# Patient Record
Sex: FEMALE | Employment: OTHER | URBAN - METROPOLITAN AREA
[De-identification: names, ages, dates, MRNs, and addresses within clinical notes are randomized per-mention and may not be internally consistent; named-entity substitution may affect disease eponyms.]

---

## 2017-01-23 DIAGNOSIS — F32.81 PMDD (PREMENSTRUAL DYSPHORIC DISORDER): ICD-10-CM

## 2017-01-23 DIAGNOSIS — Z51.81 ENCOUNTER FOR THERAPEUTIC DRUG MONITORING: Primary | ICD-10-CM

## 2017-01-23 DIAGNOSIS — F32.89 OTHER DEPRESSION: ICD-10-CM

## 2017-01-23 NOTE — TELEPHONE ENCOUNTER
----- Message from [address] [place] [address] [address] Generic sent at 1/21/2017 10:59 PM CST -----  Regarding: Prescription Question  Contact: [phone]    Our group insurance just changed prescription coverage as of 1/1/17 to [employer].  They want us to use their mail order to

## 2017-01-23 NOTE — TELEPHONE ENCOUNTER
Patient now needs to use [facility] and is requesting a 90 day refill on her Sertraline 50mg # 90  LOV- 10/11/16  Last refill- 12/31/16  Future Appointments  Date Time Provider Judson Iverson   4/18/2017 4:00 PM Pramod Sutherland DO [facility] [address]

## 2017-04-18 ENCOUNTER — OFFICE VISIT (OUTPATIENT)
Dept: FAMILY MEDICINE CLINIC | Facility: CLINIC | Age: 57
End: 2017-04-18

## 2017-04-18 VITALS
HEIGHT: 67 IN | TEMPERATURE: 98 F | OXYGEN SATURATION: 99 % | WEIGHT: 293 LBS | HEART RATE: 88 BPM | DIASTOLIC BLOOD PRESSURE: 78 MMHG | RESPIRATION RATE: 16 BRPM | SYSTOLIC BLOOD PRESSURE: 134 MMHG | BODY MASS INDEX: 45.99 KG/M2

## 2017-04-18 DIAGNOSIS — Z53.8 PAP SMEAR OF CERVIX NOT NEEDED: ICD-10-CM

## 2017-04-18 DIAGNOSIS — Z12.31 VISIT FOR SCREENING MAMMOGRAM: ICD-10-CM

## 2017-04-18 DIAGNOSIS — Z12.11 SCREEN FOR COLON CANCER: ICD-10-CM

## 2017-04-18 DIAGNOSIS — Z00.00 ROUTINE GENERAL MEDICAL EXAMINATION AT A HEALTH CARE FACILITY: Primary | ICD-10-CM

## 2017-04-18 DIAGNOSIS — E78.5 DYSLIPIDEMIA: ICD-10-CM

## 2017-04-18 DIAGNOSIS — Z71.82 EXERCISE COUNSELING: ICD-10-CM

## 2017-04-18 DIAGNOSIS — Z71.3 DIETARY COUNSELING: ICD-10-CM

## 2017-04-18 DIAGNOSIS — R92.2 DENSE BREAST TISSUE: ICD-10-CM

## 2017-04-18 DIAGNOSIS — E55.9 VITAMIN D DEFICIENCY: ICD-10-CM

## 2017-04-18 PROCEDURE — 81003 URINALYSIS AUTO W/O SCOPE: CPT | Performed by: FAMILY MEDICINE

## 2017-04-18 PROCEDURE — 99396 PREV VISIT EST AGE 40-64: CPT | Performed by: FAMILY MEDICINE

## 2017-04-18 NOTE — PROGRESS NOTES
HPI:   Noemi Darling is a 61year old female who presents for a complete physical exam and pap. She is due for a mammogram and wants 2D 3D as hx of biopsies and dense breasts. Pt feeling well today.  Dentist and eye doctors--seen dentist and due for eyes; Dysmenorrhea    • Skin tag    • Dyspnea    • Obesity    • LLQ abdominal pain    • Breast pain, right    • Noncompliance    • Dyspareunia    • Neoplasm      vaginal wall   • Abnormal pap    • Acute UTI    • Boil      staphylococcal boils   • Folliculitis in no apparent distress. SKIN: Warm, dry, pink without rashes, no suspicious lesions. HEENT: atraumatic, normocephalic,ears, nose, and throat are clear. EYES: PERRLA, EOMI, conjunctiva are clear. NECK: supple, no adenopathy, no bruits, no thyromegaly. daily    Pap smear of cervix not needed--due in 2019 for next pap   Comments:  last pap 4/16    Vitamin D deficiency  -     Vitamin D, 25-Hydroxy [E]; Future    Depression screen  PHQ2 is zero    Dyslipidemia  -     Lipid Panel [E];  Future    Screen for co

## 2017-04-18 NOTE — PATIENT INSTRUCTIONS
Marely Kahn you were seen today for your annual breast exam and physical. Please continue healthy habits with your diet and exercise. Wear sunscreen as needed and insect repellant when needed. Do the colo cards and mail in the samples.  See the GI for colonoscop Depression All women in this age group At routine exams   Gonorrhea Sexually active women at increased risk for infection At routine exams   Hepatitis C Anyone at increased risk; 1 time for those born between [facility] At routine exams   High cholester Meningococcal Women at increased risk for infection – talk with your healthcare provider 1 or more doses   Pneumococcal conjugate vaccine (PCV13) and pneumococcal polysaccharide vaccine (PPSV23) Women at increased risk for infection – talk with your health

## 2017-04-21 ENCOUNTER — LAB ENCOUNTER (OUTPATIENT)
Dept: LAB | Age: 57
End: 2017-04-21
Attending: FAMILY MEDICINE
Payer: COMMERCIAL

## 2017-04-21 DIAGNOSIS — E55.9 VITAMIN D DEFICIENCY: ICD-10-CM

## 2017-04-21 DIAGNOSIS — Z00.00 ROUTINE GENERAL MEDICAL EXAMINATION AT A HEALTH CARE FACILITY: ICD-10-CM

## 2017-04-21 DIAGNOSIS — E78.5 DYSLIPIDEMIA: ICD-10-CM

## 2017-04-21 PROCEDURE — 80050 GENERAL HEALTH PANEL: CPT | Performed by: FAMILY MEDICINE

## 2017-04-21 PROCEDURE — 84439 ASSAY OF FREE THYROXINE: CPT | Performed by: FAMILY MEDICINE

## 2017-04-21 PROCEDURE — 82306 VITAMIN D 25 HYDROXY: CPT | Performed by: FAMILY MEDICINE

## 2017-04-21 PROCEDURE — 36415 COLL VENOUS BLD VENIPUNCTURE: CPT | Performed by: FAMILY MEDICINE

## 2017-04-21 PROCEDURE — 80061 LIPID PANEL: CPT | Performed by: FAMILY MEDICINE

## 2017-04-25 ENCOUNTER — HOSPITAL ENCOUNTER (OUTPATIENT)
Dept: MAMMOGRAPHY | Age: 57
Discharge: HOME | End: 2017-04-25
Attending: FAMILY MEDICINE
Payer: COMMERCIAL

## 2017-04-25 DIAGNOSIS — R92.2 DENSE BREAST TISSUE: ICD-10-CM

## 2017-04-25 DIAGNOSIS — Z12.31 VISIT FOR SCREENING MAMMOGRAM: ICD-10-CM

## 2017-04-25 PROCEDURE — 77067 SCR MAMMO BI INCL CAD: CPT

## 2017-04-25 PROCEDURE — 77063 BREAST TOMOSYNTHESIS BI: CPT

## 2017-05-02 ENCOUNTER — TELEPHONE (OUTPATIENT)
Dept: FAMILY MEDICINE CLINIC | Facility: CLINIC | Age: 57
End: 2017-05-02

## 2017-05-02 DIAGNOSIS — E78.5 DYSLIPIDEMIA: Primary | ICD-10-CM

## 2017-05-02 NOTE — PROGRESS NOTES
Quick Note:    Patient notified of elevated cholesterol levels and that she should reconsider starting statin treatment. Patient said she will never take any statins and no need to discuss lipid treatment. She knows what it takes to bring it down.  She is w

## 2017-05-02 NOTE — PROGRESS NOTES
Quick Note:    Please call pt with normal results of Vit D and thyroid studies. Creat elevated to 1.1 with otherwise stable CMP. Lipids still elevated jena 312 total with 224 LDL and HDL of only 55.  Pt should strongly re-consider statin treatment for her l

## 2017-05-02 NOTE — TELEPHONE ENCOUNTER
----- Message from Sommer Jeronimo DO sent at 5/1/2017 11:01 PM CDT -----  Please call pt with normal results of Vit D and thyroid studies. Creat elevated to 1.1 with otherwise stable CMP.  Lipids still elevated jena 312 total with 224 LDL and HDL of only

## 2017-06-21 ENCOUNTER — TELEPHONE (OUTPATIENT)
Dept: FAMILY MEDICINE CLINIC | Facility: CLINIC | Age: 57
End: 2017-06-21

## 2017-06-21 DIAGNOSIS — Z51.81 ENCOUNTER FOR THERAPEUTIC DRUG MONITORING: Primary | ICD-10-CM

## 2017-06-21 DIAGNOSIS — F32.81 PMDD (PREMENSTRUAL DYSPHORIC DISORDER): ICD-10-CM

## 2017-06-21 DIAGNOSIS — F32.89 OTHER DEPRESSION: ICD-10-CM

## 2017-08-01 ENCOUNTER — OFFICE VISIT (OUTPATIENT)
Dept: FAMILY MEDICINE CLINIC | Facility: CLINIC | Age: 57
End: 2017-08-01

## 2017-08-01 VITALS
HEART RATE: 60 BPM | SYSTOLIC BLOOD PRESSURE: 134 MMHG | DIASTOLIC BLOOD PRESSURE: 82 MMHG | HEIGHT: 67 IN | BODY MASS INDEX: 45.04 KG/M2 | RESPIRATION RATE: 16 BRPM | TEMPERATURE: 98 F | OXYGEN SATURATION: 99 % | WEIGHT: 287 LBS

## 2017-08-01 DIAGNOSIS — D25.9 UTERINE LEIOMYOMA, UNSPECIFIED LOCATION: ICD-10-CM

## 2017-08-01 DIAGNOSIS — R30.0 DYSURIA: ICD-10-CM

## 2017-08-01 DIAGNOSIS — R39.89 SENSATION OF PRESSURE IN BLADDER AREA: Primary | ICD-10-CM

## 2017-08-01 LAB
APPEARANCE: CLEAR
MULTISTIX LOT#: NORMAL NUMERIC
PH, URINE: 6 (ref 4.5–8)
SPECIFIC GRAVITY: 1.01 (ref 1–1.03)
URINE-COLOR: YELLOW
UROBILINOGEN,SEMI-QN: 0.2 MG/DL (ref 0–1.9)

## 2017-08-01 PROCEDURE — 81003 URINALYSIS AUTO W/O SCOPE: CPT | Performed by: FAMILY MEDICINE

## 2017-08-01 PROCEDURE — 87086 URINE CULTURE/COLONY COUNT: CPT | Performed by: FAMILY MEDICINE

## 2017-08-01 PROCEDURE — 99213 OFFICE O/P EST LOW 20 MIN: CPT | Performed by: FAMILY MEDICINE

## 2017-08-01 RX ORDER — PHENAZOPYRIDINE HYDROCHLORIDE 200 MG/1
200 TABLET, FILM COATED ORAL 3 TIMES DAILY PRN
Qty: 18 TABLET | Refills: 0 | Status: SHIPPED | OUTPATIENT
Start: 2017-08-01 | End: 2018-10-30 | Stop reason: ALTCHOICE

## 2017-08-01 NOTE — PROGRESS NOTES
Pt here for acute visit. Feels urinary pressure and feels like she has a full bladder and cramps at times --did home tests for UTI and negative. Urgent care visit and had normal dip and told to see PCP--another urine dip done today.  Informed UA normal. Sh 0.00      Years: 0.00      Alcohol use: Yes              Comment: social       REVIEW OF SYSTEMS:   GENERAL HEALTH: feels pelvic pressure and not sure if she has a UTI or not.  see HPI notes above for further details  SKIN: denies any unusual skin lesions o as needed for Pain.  -     US PELVIS (TRANSVAGINAL PELVIS) (CPT=76830); Future  -     Cancel: US PELVIS (TRANSABDOMINAL PELVIS)  (DYK=73226);  Future    Dysuria  -     URINALYSIS, AUTO, W/O SCOPE  -     URINE CULTURE, ROUTINE; Future  -     UROLOGY - INTERN

## 2017-08-01 NOTE — PATIENT INSTRUCTIONS
Radha Etienne you were seen for lower pelvic discomfort and your UA was normal. I sent for a urine culture. Try the Pyridium three times a day for 6 days. See urology and also your gyne doctor for evaluation. Get a pelvic US done as well to assess.  Tylenol or Joyce

## 2017-08-02 ENCOUNTER — HOSPITAL ENCOUNTER (OUTPATIENT)
Dept: ULTRASOUND IMAGING | Age: 57
Discharge: HOME | End: 2017-08-02
Attending: FAMILY MEDICINE
Payer: COMMERCIAL

## 2017-08-02 DIAGNOSIS — R30.0 DYSURIA: ICD-10-CM

## 2017-08-02 DIAGNOSIS — D25.9 UTERINE LEIOMYOMA, UNSPECIFIED LOCATION: ICD-10-CM

## 2017-08-02 DIAGNOSIS — R39.89 SENSATION OF PRESSURE IN BLADDER AREA: ICD-10-CM

## 2017-08-02 PROCEDURE — 76856 US EXAM PELVIC COMPLETE: CPT | Performed by: FAMILY MEDICINE

## 2017-08-02 PROCEDURE — 76830 TRANSVAGINAL US NON-OB: CPT | Performed by: FAMILY MEDICINE

## 2017-08-04 NOTE — PROGRESS NOTES
Please call pt with US results of pelvis. 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  There is suggestion of multiple myometrial cysts and may represent necrotic portions of

## 2017-08-04 NOTE — PROGRESS NOTES
Notes Recorded by Seferino Zepeda DO on 8/4/2017 at 8:35 AM CDT  Please call pt with negative urine culture ---  Dr. Santos Spearing

## 2017-08-07 ENCOUNTER — TELEPHONE (OUTPATIENT)
Dept: FAMILY MEDICINE CLINIC | Facility: CLINIC | Age: 57
End: 2017-08-07

## 2017-08-07 DIAGNOSIS — D25.0 INTRAMURAL AND SUBMUCOUS LEIOMYOMA OF UTERUS: Primary | ICD-10-CM

## 2017-08-07 DIAGNOSIS — D25.1 INTRAMURAL AND SUBMUCOUS LEIOMYOMA OF UTERUS: Primary | ICD-10-CM

## 2017-08-07 DIAGNOSIS — R10.2 PELVIC PAIN: ICD-10-CM

## 2017-08-07 DIAGNOSIS — R93.5 ABNORMAL CT OF THE ABDOMEN: ICD-10-CM

## 2017-08-07 DIAGNOSIS — Q51.3 BICORNATE UTERUS: ICD-10-CM

## 2017-08-07 NOTE — TELEPHONE ENCOUNTER
----- Message from [facility], DO sent at 8/4/2017  8:51 AM CDT -----  Please call pt with US results of pelvis. 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  There is sug

## 2017-09-26 ENCOUNTER — TELEPHONE (OUTPATIENT)
Dept: FAMILY MEDICINE CLINIC | Facility: CLINIC | Age: 57
End: 2017-09-26

## 2017-09-26 NOTE — TELEPHONE ENCOUNTER
Pt is having her 6 month follow up of breast nodules done on 10.11.17 and [facility] is calling for an order.  They need a limited or targeted US of the right breast. Please place and fax to [phone]

## 2017-10-06 ENCOUNTER — TELEPHONE (OUTPATIENT)
Dept: FAMILY MEDICINE CLINIC | Facility: CLINIC | Age: 57
End: 2017-10-06

## 2017-10-06 DIAGNOSIS — Z98.890 STATUS POST BIOPSY: Primary | ICD-10-CM

## 2017-10-06 NOTE — TELEPHONE ENCOUNTER
Call pt to see where she will be going and ok to order the right breast US at the correct place.  Dr. Tariq Espinoza

## 2017-10-06 NOTE — TELEPHONE ENCOUNTER
LARY called that patient is due for a 6 month right breast US post biopsy, she is scheduled for 10/11/17. Patient had a mammogram om 4/25/17 at [facility]? ?

## 2017-10-09 NOTE — TELEPHONE ENCOUNTER
LARY called in regards to this order. Please fax to [facility] at [phone] or call [phone]. Patient has an appt on Wednesday, October 11, 2017.

## 2017-10-10 ENCOUNTER — TELEPHONE (OUTPATIENT)
Dept: FAMILY MEDICINE CLINIC | Facility: CLINIC | Age: 57
End: 2017-10-10

## 2017-10-10 DIAGNOSIS — N63.0 BREAST MASS: Primary | ICD-10-CM

## 2017-10-11 ENCOUNTER — MED REC SCAN ONLY (OUTPATIENT)
Dept: FAMILY MEDICINE CLINIC | Facility: CLINIC | Age: 57
End: 2017-10-11

## 2017-10-12 ENCOUNTER — HOSPITAL ENCOUNTER (OUTPATIENT)
Dept: MRI IMAGING | Age: 57
Discharge: HOME | End: 2017-10-12
Attending: FAMILY MEDICINE
Payer: COMMERCIAL

## 2017-10-12 DIAGNOSIS — R93.5 ABNORMAL CT OF THE ABDOMEN: ICD-10-CM

## 2017-10-12 DIAGNOSIS — D25.1 INTRAMURAL AND SUBMUCOUS LEIOMYOMA OF UTERUS: ICD-10-CM

## 2017-10-12 DIAGNOSIS — D25.0 INTRAMURAL AND SUBMUCOUS LEIOMYOMA OF UTERUS: ICD-10-CM

## 2017-10-12 DIAGNOSIS — R10.2 PELVIC PAIN: ICD-10-CM

## 2017-10-12 DIAGNOSIS — Q51.3 BICORNATE UTERUS: ICD-10-CM

## 2017-10-12 PROCEDURE — 72197 MRI PELVIS W/O & W/DYE: CPT | Performed by: FAMILY MEDICINE

## 2017-10-12 PROCEDURE — 72195 MRI PELVIS W/O DYE: CPT | Performed by: FAMILY MEDICINE

## 2017-10-16 NOTE — PROGRESS NOTES
Please call pt with MRI results of her pelvis:  =====  CONCLUSION:    1. There are 2 relatively large uterine fibroids noted along the right and left halves of the uterine fundus respectively.  They are relatively symmetric in size and appearance with mild

## 2017-11-03 NOTE — PROGRESS NOTES
Notes Recorded by Seferino Zepeda DO on 10/16/2017 at 5:00 PM CDT  Please call pt with MRI results of her pelvis:  =====  CONCLUSION:    1.  There are 2 relatively large uterine fibroids noted along the right and left halves of the uterine fundus respecti

## 2018-01-06 PROCEDURE — 82272 OCCULT BLD FECES 1-3 TESTS: CPT | Performed by: FAMILY MEDICINE

## 2018-01-26 DIAGNOSIS — F32.81 PMDD (PREMENSTRUAL DYSPHORIC DISORDER): ICD-10-CM

## 2018-01-26 DIAGNOSIS — Z51.81 ENCOUNTER FOR THERAPEUTIC DRUG MONITORING: Primary | ICD-10-CM

## 2018-01-26 DIAGNOSIS — F32.89 OTHER DEPRESSION: ICD-10-CM

## 2018-01-26 NOTE — TELEPHONE ENCOUNTER
SERTRALINE HCL 50 MG TABLET  Will file in chart as: SERTRALINE HCL 50 MG Oral Tab  TAKE 1 TABLET BY MOUTH EVERY DAY       Disp: 90 tablet Refills: 0    Class: Normal Start: 1/26/2018   For: Other depression, PMDD (premenstrual dysphoric disorder), Encounte

## 2018-02-03 DIAGNOSIS — F32.89 OTHER DEPRESSION: ICD-10-CM

## 2018-02-03 DIAGNOSIS — Z51.81 ENCOUNTER FOR THERAPEUTIC DRUG MONITORING: ICD-10-CM

## 2018-02-03 DIAGNOSIS — F32.81 PMDD (PREMENSTRUAL DYSPHORIC DISORDER): ICD-10-CM

## 2018-02-03 NOTE — TELEPHONE ENCOUNTER
Received refill request from [address] [place] [address] for Sertraline 50 mg tab for quantity of 90. Reference number [id number].

## 2018-02-05 NOTE — TELEPHONE ENCOUNTER
Sertraline HCl 50 MG Oral Tab  Take 1 tablet (50 mg total) by mouth once daily.        Disp: 90 tablet Refills: 1    Class: Normal Start: 2/3/2018 - 8/2/2018   For: Other depression, PMDD (premenstrual dysphoric disorder), Encounter for therapeutic drug mon

## 2018-02-06 NOTE — TELEPHONE ENCOUNTER
There is nothing pended for me to approve  It looks like it was already sent   Pt is also due for an OV  8/1/17 was for an acute visit  Per note 4/18/17 pt was to follow-up in 6 months  Please reach out to pt to schedule OV

## 2018-02-06 NOTE — TELEPHONE ENCOUNTER
1st outreach to schedule ov. Pt informed of Dr. Mala Guerrier no longer with [facility]. Pt will research Doctor's at the practice and call back to schedule.

## 2018-04-06 ENCOUNTER — TELEPHONE (OUTPATIENT)
Dept: FAMILY MEDICINE CLINIC | Facility: CLINIC | Age: 58
End: 2018-04-06

## 2018-04-06 DIAGNOSIS — N63.0 BREAST MASS: Primary | ICD-10-CM

## 2018-04-06 NOTE — TELEPHONE ENCOUNTER
Order was under Dr. Ambika Pedroza and u/s is asking for new provider since she is no longer here. I have placed the order under Torie Pablo who is also covering for Dr. Lexis Etienne patients.

## 2018-04-06 NOTE — TELEPHONE ENCOUNTER
US BREAST RIGHT LIMITED (AEK=00286)    Please update and change provider on the above Order  PT is scheduled to have this done Thursday 4.12.18

## 2018-04-10 ENCOUNTER — TELEPHONE (OUTPATIENT)
Dept: FAMILY MEDICINE CLINIC | Facility: CLINIC | Age: 58
End: 2018-04-10

## 2018-05-01 ENCOUNTER — TELEPHONE (OUTPATIENT)
Dept: FAMILY MEDICINE CLINIC | Facility: CLINIC | Age: 58
End: 2018-05-01

## 2018-05-01 DIAGNOSIS — Z12.31 ENCOUNTER FOR SCREENING MAMMOGRAM FOR MALIGNANT NEOPLASM OF BREAST: ICD-10-CM

## 2018-05-01 DIAGNOSIS — R92.8 ABNORMAL MAMMOGRAM: Primary | ICD-10-CM

## 2018-05-01 NOTE — TELEPHONE ENCOUNTER
Per Marga Au: six month US repeat. 1 year mammogram recommended. See results above. Spoke with patient regarding breast US. All questions and concerns answered. Follow up testing ordered.

## 2018-10-01 ENCOUNTER — MED REC SCAN ONLY (OUTPATIENT)
Dept: FAMILY MEDICINE CLINIC | Facility: CLINIC | Age: 58
End: 2018-10-01

## 2018-10-10 ENCOUNTER — TELEPHONE (OUTPATIENT)
Dept: FAMILY MEDICINE CLINIC | Facility: CLINIC | Age: 58
End: 2018-10-10

## 2018-10-10 DIAGNOSIS — N63.0 LUMP OR MASS IN BREAST: Primary | ICD-10-CM

## 2018-10-10 NOTE — TELEPHONE ENCOUNTER
Spoke to Alfreda Thurman at [address] [place] [address] and patient is scheduling US right Limited breast, order is needed.   CPT 43089  DX: [id number]  Fax order to [phone]    Pt is having US as a follow up to Mammogram done in April 2018    Order pended for approval

## 2018-10-10 NOTE — TELEPHONE ENCOUNTER
Order faxed to [facility] imaging at [phone], confirmation received

## 2018-10-21 ENCOUNTER — PATIENT MESSAGE (OUTPATIENT)
Dept: FAMILY MEDICINE CLINIC | Facility: CLINIC | Age: 58
End: 2018-10-21

## 2018-10-22 ENCOUNTER — PATIENT MESSAGE (OUTPATIENT)
Dept: FAMILY MEDICINE CLINIC | Facility: CLINIC | Age: 58
End: 2018-10-22

## 2018-10-22 DIAGNOSIS — F32.81 PMDD (PREMENSTRUAL DYSPHORIC DISORDER): ICD-10-CM

## 2018-10-22 DIAGNOSIS — Z51.81 ENCOUNTER FOR THERAPEUTIC DRUG MONITORING: ICD-10-CM

## 2018-10-22 DIAGNOSIS — F32.89 OTHER DEPRESSION: ICD-10-CM

## 2018-10-22 NOTE — TELEPHONE ENCOUNTER
From: Jarrod Bean  To: Almas Sprague MD  Sent: 10/21/2018 11:32 PM CDT  Subject: Prescription Question    I need some help getting an extension on my Sertraline prescription.  [place] and came home today and in the

## 2018-10-22 NOTE — TELEPHONE ENCOUNTER
From: Noemi Darling  To: Nakita Taylor MD  Sent: 10/22/2018 1:28 AM CDT  Subject: Prescription Question    Sorry - you can disregard my previous message about the 7 day refill request for my sertraline.  I did finally locate it buried under some othe

## 2018-10-23 NOTE — TELEPHONE ENCOUNTER
Name from pharmacy: SERTRALINE 50MG TABLETS         Will file in chart as: SERTRALINE HCL 50 MG Oral Tab    Sig: TAKE 1 TABLET(50 MG) BY MOUTH EVERY DAY    Disp:  30 tablet    Refills:  0    Start: 10/22/2018     Class: Normal    For: Other depression, PM

## 2018-10-24 ENCOUNTER — PATIENT MESSAGE (OUTPATIENT)
Dept: FAMILY MEDICINE CLINIC | Facility: CLINIC | Age: 58
End: 2018-10-24

## 2018-10-24 NOTE — TELEPHONE ENCOUNTER
From: Chloe Drake  To: Katy Euceda MD  Sent: 10/24/2018 12:44 PM CDT  Subject: Other    Just got a call from [facility] for [address] for appointment reminder for a breast ultrasound on Friday. Looks like they need a doctor's order.  Alisa Correa

## 2018-10-26 ENCOUNTER — TELEPHONE (OUTPATIENT)
Dept: FAMILY MEDICINE CLINIC | Facility: CLINIC | Age: 58
End: 2018-10-26

## 2018-10-26 DIAGNOSIS — N63.0 LUMP OR MASS IN BREAST: Primary | ICD-10-CM

## 2018-10-26 NOTE — TELEPHONE ENCOUNTER
Siomara with [facility] is on the phone requesting an Ultra sound Left breast limited and Diagnostic L breast mammogram.  Pt is there right now and the found a lump on the Left breast N63     Transfer to triage.

## 2018-10-26 NOTE — TELEPHONE ENCOUNTER
Call transferred to me in triage. Patient is at [facility] for mammography - f/u right breast imaging. Patient has a palpable lump in the left breast and they are asking if we can send order to check that breast as well. I did send the order to them.

## 2018-10-30 NOTE — PROGRESS NOTES
[facility] Visit Note  10/30/2018      Subjective:      Patient ID: Jerris Severin is a 60year old female. Chief Complaint:  Patient presents with:  Medication Follow-Up: Here for med refills on the Sertraline.   Test Results: wants to discuss Resp: 16   Temp: 97.6 °F (36.4 °C)   TempSrc: Temporal   Weight: (!) 306 lb   Height: 67\"       Physical Examination   General:  Alert, in no acute distress  HEENT: NCAT, EOMI, mucus membranes moist   Neck:  No cervical lymphadenopathy  CV: Regular rate

## 2018-11-06 ENCOUNTER — TELEPHONE (OUTPATIENT)
Dept: FAMILY MEDICINE CLINIC | Facility: CLINIC | Age: 58
End: 2018-11-06

## 2018-11-06 DIAGNOSIS — R92.8 ABNORMAL MAMMOGRAM OF RIGHT BREAST: Primary | ICD-10-CM

## 2018-11-06 NOTE — TELEPHONE ENCOUNTER
Patient was scheduled for a biopsy but it was not necessary. They would like a new order for a \"targeted R breast US\" faxed to [phone].

## 2019-04-09 ENCOUNTER — TELEPHONE (OUTPATIENT)
Dept: FAMILY MEDICINE CLINIC | Facility: CLINIC | Age: 59
End: 2019-04-09

## 2019-04-09 DIAGNOSIS — N63.0 LUMP OR MASS IN BREAST: Primary | ICD-10-CM

## 2019-04-09 NOTE — TELEPHONE ENCOUNTER
LARY is calling for 6 month follow up orders for US of right breast and follow up mammo.  Please fax to [facility] at [phone]

## 2019-04-11 ENCOUNTER — OFFICE VISIT (OUTPATIENT)
Dept: FAMILY MEDICINE CLINIC | Facility: CLINIC | Age: 59
End: 2019-04-11
Payer: COMMERCIAL

## 2019-04-11 VITALS
BODY MASS INDEX: 47.09 KG/M2 | HEART RATE: 66 BPM | WEIGHT: 293 LBS | DIASTOLIC BLOOD PRESSURE: 84 MMHG | RESPIRATION RATE: 16 BRPM | TEMPERATURE: 98 F | HEIGHT: 66 IN | SYSTOLIC BLOOD PRESSURE: 136 MMHG

## 2019-04-11 DIAGNOSIS — Z78.0 POSTMENOPAUSAL ESTROGEN DEFICIENCY: ICD-10-CM

## 2019-04-11 DIAGNOSIS — Z12.4 SCREENING FOR CERVICAL CANCER: ICD-10-CM

## 2019-04-11 DIAGNOSIS — Z12.11 SCREENING FOR COLON CANCER: ICD-10-CM

## 2019-04-11 DIAGNOSIS — E78.2 MIXED HYPERLIPIDEMIA: ICD-10-CM

## 2019-04-11 DIAGNOSIS — E66.01 CLASS 3 SEVERE OBESITY DUE TO EXCESS CALORIES WITHOUT SERIOUS COMORBIDITY WITH BODY MASS INDEX (BMI) OF 50.0 TO 59.9 IN ADULT (HCC): ICD-10-CM

## 2019-04-11 DIAGNOSIS — Z77.011 LEAD EXPOSURE: ICD-10-CM

## 2019-04-11 DIAGNOSIS — R92.8 ABNORMAL MAMMOGRAM OF RIGHT BREAST: ICD-10-CM

## 2019-04-11 DIAGNOSIS — F32.0 CURRENT MILD EPISODE OF MAJOR DEPRESSIVE DISORDER WITHOUT PRIOR EPISODE (HCC): ICD-10-CM

## 2019-04-11 DIAGNOSIS — Z00.00 ROUTINE MEDICAL EXAM: Primary | ICD-10-CM

## 2019-04-11 DIAGNOSIS — E55.9 VITAMIN D INSUFFICIENCY: ICD-10-CM

## 2019-04-11 PROCEDURE — 88175 CYTOPATH C/V AUTO FLUID REDO: CPT | Performed by: FAMILY MEDICINE

## 2019-04-11 PROCEDURE — 87624 HPV HI-RISK TYP POOLED RSLT: CPT | Performed by: FAMILY MEDICINE

## 2019-04-11 PROCEDURE — 99396 PREV VISIT EST AGE 40-64: CPT | Performed by: FAMILY MEDICINE

## 2019-04-11 RX ORDER — CHLORAL HYDRATE 500 MG
1000 CAPSULE ORAL DAILY
COMMUNITY

## 2019-04-11 NOTE — PROGRESS NOTES
Patient presents with:  Physical  Pap      HPI:  Zara Cagle is a 60year old female here today for preventative visit. Imms- up to date with tdap and flu, will discuss shingrix.  had shingles.       Cervical cancer screening- last pap was nor SPECIMEN 1 SITE LEFT Left 2010    2 at same time, both benign. • NEEDLE BIOPSY LEFT Left 2016    ultra sound guided bx, lipoma. • NEEDLE BIOPSY RIGHT Right 11/2016    ultra sound guided bx, lipoma.    • Juna[address]       Current Outpatient Medic 3-4x/yr ,never a problem      Drug use: No      Sexual activity: Not on file    Lifestyle      Physical activity:        Days per week: Not on file        Minutes per session: Not on file      Stress: Not on file    Relationships      Social connections: reactive to light,  conjunctivae normal.    Ears- Tympanic membranes intact bilaterally. Nose- Patent, normal septum and turbinates. Mouth/Throat- Normal oral mucosa, throat non-erythematous.   NECK:  No submental, submandibular, ant/post cervical lym calories without serious comorbidity with body mass index (BMI) of 50.0 to 59.9 in adult (HCC)  - ASSAY, THYROID STIM HORMONE; Future  - OP REFERRAL TO WEIGHT LOSS CLINIC    10. Lead exposure  - LEAD, BLOOD; Future        Patient verbalized understanding a

## 2019-04-15 ENCOUNTER — TELEPHONE (OUTPATIENT)
Dept: FAMILY MEDICINE CLINIC | Facility: CLINIC | Age: 59
End: 2019-04-15

## 2019-04-15 DIAGNOSIS — R92.8 ABNORMAL MAMMOGRAM OF RIGHT BREAST: Primary | ICD-10-CM

## 2019-04-15 NOTE — TELEPHONE ENCOUNTER
[facility] has received an order for a Diagnostic Mammo, the order needs to be changed to a bilateral mammo. Fax number left, patient is scheduled for next week.

## 2019-04-17 ENCOUNTER — APPOINTMENT (OUTPATIENT)
Dept: LAB | Age: 59
End: 2019-04-17
Attending: FAMILY MEDICINE
Payer: COMMERCIAL

## 2019-04-17 DIAGNOSIS — E55.9 VITAMIN D INSUFFICIENCY: ICD-10-CM

## 2019-04-17 DIAGNOSIS — Z77.011 LEAD EXPOSURE: ICD-10-CM

## 2019-04-17 DIAGNOSIS — E66.01 CLASS 3 SEVERE OBESITY DUE TO EXCESS CALORIES WITHOUT SERIOUS COMORBIDITY WITH BODY MASS INDEX (BMI) OF 50.0 TO 59.9 IN ADULT (HCC): ICD-10-CM

## 2019-04-17 DIAGNOSIS — F32.0 CURRENT MILD EPISODE OF MAJOR DEPRESSIVE DISORDER WITHOUT PRIOR EPISODE (HCC): ICD-10-CM

## 2019-04-17 DIAGNOSIS — E78.2 MIXED HYPERLIPIDEMIA: ICD-10-CM

## 2019-04-17 PROCEDURE — 84443 ASSAY THYROID STIM HORMONE: CPT | Performed by: FAMILY MEDICINE

## 2019-04-17 PROCEDURE — 83655 ASSAY OF LEAD: CPT | Performed by: FAMILY MEDICINE

## 2019-04-17 PROCEDURE — 80061 LIPID PANEL: CPT | Performed by: FAMILY MEDICINE

## 2019-04-17 PROCEDURE — 82306 VITAMIN D 25 HYDROXY: CPT | Performed by: FAMILY MEDICINE

## 2019-04-17 PROCEDURE — 36415 COLL VENOUS BLD VENIPUNCTURE: CPT | Performed by: FAMILY MEDICINE

## 2019-04-17 PROCEDURE — 80053 COMPREHEN METABOLIC PANEL: CPT | Performed by: FAMILY MEDICINE

## 2019-04-18 ENCOUNTER — TELEPHONE (OUTPATIENT)
Dept: FAMILY MEDICINE CLINIC | Facility: CLINIC | Age: 59
End: 2019-04-18

## 2019-04-19 NOTE — TELEPHONE ENCOUNTER
Mammogram order changed. Unclear as to where pt will be having mammogram done. Order states [facility]  Fax information was to [facility]  Left message for patient to [facility] for clarification.

## 2019-05-02 NOTE — PROGRESS NOTES
Bilateral Digital Screening Mammogram received via fax from [address] located at [place] [address] in [facility]. Phone # [phone], fax # [phone]. Date of exam was 05/01/19, recommends recheck in 1 year.  Health

## 2019-06-06 ENCOUNTER — TELEPHONE (OUTPATIENT)
Dept: FAMILY MEDICINE CLINIC | Facility: CLINIC | Age: 59
End: 2019-06-06

## 2019-06-06 NOTE — TELEPHONE ENCOUNTER
Patient is having R eye cataract surgery with Dr. Alee Mcgill [facility] Only H&P needed, no labs, no ekg, fax to [phone]. Dx code X99.094. Patient scheduled for 6/11/19.  The office attempted to fax but they are having technic

## 2019-06-11 ENCOUNTER — OFFICE VISIT (OUTPATIENT)
Dept: FAMILY MEDICINE CLINIC | Facility: CLINIC | Age: 59
End: 2019-06-11
Payer: COMMERCIAL

## 2019-06-11 VITALS
SYSTOLIC BLOOD PRESSURE: 142 MMHG | TEMPERATURE: 97 F | HEART RATE: 68 BPM | RESPIRATION RATE: 16 BRPM | HEIGHT: 66 IN | BODY MASS INDEX: 47.09 KG/M2 | WEIGHT: 293 LBS | DIASTOLIC BLOOD PRESSURE: 80 MMHG

## 2019-06-11 DIAGNOSIS — N63.0 LUMP OR MASS IN BREAST: Primary | ICD-10-CM

## 2019-06-11 PROCEDURE — 99214 OFFICE O/P EST MOD 30 MIN: CPT | Performed by: PHYSICIAN ASSISTANT

## 2019-06-11 NOTE — PATIENT INSTRUCTIONS
Thank you for choosing Nevaeh Cortez PA-C at [place] [address]  To Do: Jefferson Goetz  1.  Pt to follow-up after surgery as directed    • Please signup for MY CHART, which is electronic access to your record if you have not done so.  All your results [phone]  Please allow our office 5 business days to contact you regarding any testing results. Refill policies:   Allow 3 business days for refills; controlled substances may take longer and must be picked up from the office in person.   Narcotic me

## 2019-06-11 NOTE — PROGRESS NOTES
Preoperative History and Physical    CC:  Patient presents with:  Pre-Op Exam: Right eye cataract surgery with Dr. Karoline Brar on 06/19/19 at the [facility].       Madi Arana is 60year old presenting for a preoperative exam.    This patient dysphoric disorder)    • Vitamin D deficiency        Past Surgical History:   Procedure Laterality Date   • BREAST BIOPSY      total of 5-6   • ENDOMETRIAL ABLATION  2005/6 & ~2014 2/2 DUB   • SANKET NEEDLE LOCALIZATION W/ SPECIMEN 1 SITE LEFT Left 2010 UNITS Oral Tab Take  by mouth. Disp:  Rfl:    mometasone (NASONEX) 50 MCG/ACT Nasal Suspension 2 sprays by Nasal route as needed. Disp: 17 g Rfl: 6     No current facility-administered medications on file prior to visit.      Review of Systems   Review of S Plan:  Lump or mass in breast  (primary encounter diagnosis)    As noted above this preoperative evaluation is at the request of Dr. Arthur Koyanagi  Patient presents with:  Pre-Op Exam: Right eye cataract surgery with Dr. Arthur Koyanagi on 06/19/19 at the [address] [place] [address] Outcome: Patient verbalizes understanding. No orders of the defined types were placed in this encounter. No orders of the defined types were placed in this encounter.     Requested Prescriptions      No prescriptions requested or ordered in this encou

## 2019-06-12 ENCOUNTER — PATIENT MESSAGE (OUTPATIENT)
Dept: FAMILY MEDICINE CLINIC | Facility: CLINIC | Age: 59
End: 2019-06-12

## 2019-06-12 DIAGNOSIS — F32.0 CURRENT MILD EPISODE OF MAJOR DEPRESSIVE DISORDER WITHOUT PRIOR EPISODE (HCC): ICD-10-CM

## 2019-06-12 NOTE — TELEPHONE ENCOUNTER
From: Shyam Moon  To: Genet Matson MD  Sent: 6/12/2019 1:20 PM CDT  Subject: Prescription Question    Will you send prescription for Sertraline to [facility] pharmacy so I can convert to 90 day mail order? Thank you.

## 2019-06-14 ENCOUNTER — TELEPHONE (OUTPATIENT)
Dept: FAMILY MEDICINE CLINIC | Facility: CLINIC | Age: 59
End: 2019-06-14

## 2019-06-14 NOTE — TELEPHONE ENCOUNTER
Right eye cataract surgery with Dr. Surya Mcdonough on 06/19/19 at the [facility]. Office visit notes from 6/11/[facility] fax # [phone]. Fax confirmation received.

## 2019-10-23 ENCOUNTER — OFFICE VISIT (OUTPATIENT)
Dept: FAMILY MEDICINE CLINIC | Facility: CLINIC | Age: 59
End: 2019-10-23
Payer: COMMERCIAL

## 2019-10-23 VITALS
BODY MASS INDEX: 47.09 KG/M2 | TEMPERATURE: 98 F | WEIGHT: 293 LBS | HEART RATE: 58 BPM | RESPIRATION RATE: 18 BRPM | OXYGEN SATURATION: 98 % | DIASTOLIC BLOOD PRESSURE: 74 MMHG | HEIGHT: 66 IN | SYSTOLIC BLOOD PRESSURE: 126 MMHG

## 2019-10-23 DIAGNOSIS — H26.9 CATARACT OF RIGHT EYE, UNSPECIFIED CATARACT TYPE: ICD-10-CM

## 2019-10-23 DIAGNOSIS — E66.01 CLASS 3 SEVERE OBESITY DUE TO EXCESS CALORIES WITHOUT SERIOUS COMORBIDITY WITH BODY MASS INDEX (BMI) OF 50.0 TO 59.9 IN ADULT (HCC): ICD-10-CM

## 2019-10-23 DIAGNOSIS — E78.5 DYSLIPIDEMIA: ICD-10-CM

## 2019-10-23 DIAGNOSIS — Z01.818 PREOP EXAMINATION: Primary | ICD-10-CM

## 2019-10-23 DIAGNOSIS — F32.0 CURRENT MILD EPISODE OF MAJOR DEPRESSIVE DISORDER WITHOUT PRIOR EPISODE (HCC): ICD-10-CM

## 2019-10-23 PROCEDURE — 99214 OFFICE O/P EST MOD 30 MIN: CPT | Performed by: PHYSICIAN ASSISTANT

## 2019-10-23 RX ORDER — OFLOXACIN 3 MG/ML
SOLUTION/ DROPS OPHTHALMIC
Refills: 1 | COMMUNITY
Start: 2019-06-11 | End: 2020-03-13

## 2019-10-23 RX ORDER — BROMFENAC 0.76 MG/ML
SOLUTION/ DROPS OPHTHALMIC
Refills: 1 | COMMUNITY
Start: 2019-06-11 | End: 2020-03-13

## 2019-10-23 RX ORDER — PREDNISOLONE ACETATE 10 MG/ML
SUSPENSION/ DROPS OPHTHALMIC
Refills: 1 | COMMUNITY
Start: 2019-06-11 | End: 2020-03-13

## 2019-10-23 NOTE — PROGRESS NOTES
Preoperative History and Physical    CC:  Patient presents with:  Pre-Op Exam: R eye cataract surgery with Dr. Mike Antonio on 11/6//2019.       Tarsha Asher is 60year old presenting for a preoperative exam.    This patient is having surgery on date: 11/6/19 30 yrs ago   • Dyslipidemia    • Dyspareunia    • Fibroids    • History of depression    • Neoplasm     vaginal wall   • Noncompliance    • Obesity    • PMDD (premenstrual dysphoric disorder)    • Vitamin D deficiency        Past Surgical History:   Proced tablet, Rfl: 3  omega-3 fatty acids 1000 MG Oral Cap, Take 1,000 mg by mouth daily. , Disp: , Rfl:   Fexofenadine HCl (ALLEGRA OR), Take 1 tablet by mouth as needed. , Disp: , Rfl:   Multiple Vitamin (MULTI-VITAMIN DAILY OR), Take  by mouth., Disp: , Rfl: rales.   Abdominal: Soft. Bowel sounds are normal. There is no tenderness. There is no rebound and no guarding. Lymphadenopathy:     She has no cervical adenopathy. Neurological: She is alert and oriented to person, place, and time.    Skin: Skin is war postop complications.  Alcohol and drug abuse can precipitate withdrawal after surgery or complications during surgery.     Personal or Family History of Surgical Complication         Medication Assessment   Performed     Pt is moderate risk for a low to i

## 2019-10-23 NOTE — PATIENT INSTRUCTIONS
Thank you for choosing Rgio Rose PA-C at [place] [address]  To Do: Kathleen Gasca  1.  Pt to follow-up after surgery    • Please signup for MY CHART, which is electronic access to your record if you have not done so.  All your results will post on [phone]  Please allow our office 5 business days to contact you regarding any testing results. Refill policies:   Allow 3 business days for refills; controlled substances may take longer and must be picked up from the office in person.   Narcotic me

## 2019-10-25 ENCOUNTER — TELEPHONE (OUTPATIENT)
Dept: FAMILY MEDICINE CLINIC | Facility: CLINIC | Age: 59
End: 2019-10-25

## 2019-10-25 NOTE — TELEPHONE ENCOUNTER
Pt to have cataract surgery of the right eye on 11/6/2019 with Dr. Surya Mcdonough. Surgery date was originally 10/30/2019. H&P clearance was completed and [facility] at fax# [phone]. Fax confirmation received.   Placed in Josselin's p

## 2020-02-10 ENCOUNTER — PATIENT MESSAGE (OUTPATIENT)
Dept: FAMILY MEDICINE CLINIC | Facility: CLINIC | Age: 60
End: 2020-02-10

## 2020-02-10 NOTE — TELEPHONE ENCOUNTER
From: Iris Astudillo  To: Cristina Alba MD  Sent: 2/10/2020 4:11 PM CST  Subject: Referral Request    I have a couple of cysts on my back that need to be removed.  Do you have a recommendation for a dermatologist? Thank you

## 2020-02-10 NOTE — TELEPHONE ENCOUNTER
From: Joselo Chung  To: Lucero Castro MD  Sent: 2/10/2020 4:19 PM CST  Subject: Referral Request    Thanks for quick reply for dermatologist recommendation. Do you have anyone in [place]?

## 2020-06-10 ENCOUNTER — TELEPHONE (OUTPATIENT)
Dept: FAMILY MEDICINE CLINIC | Facility: CLINIC | Age: 60
End: 2020-06-10

## 2020-06-10 NOTE — TELEPHONE ENCOUNTER
Received via fax Bilateral Screening Mammogram Report from [address] [place] [address] located at [address] in [facility].   Date of exam was 06-09-20.health Maintenance updated & report placed in Dr. Jonathan Wood in box

## 2020-06-26 ENCOUNTER — PATIENT MESSAGE (OUTPATIENT)
Dept: FAMILY MEDICINE CLINIC | Facility: CLINIC | Age: 60
End: 2020-06-26

## 2020-06-26 NOTE — TELEPHONE ENCOUNTER
From: Jerris Severin  To: Ralph Manley MD  Sent: 6/26/2020 1:30 AM CDT  Subject: Prescription Question    May I please get a renewal for Sertraline HCl 50 MG Tabs?   Thank you

## 2020-06-26 NOTE — TELEPHONE ENCOUNTER
Requested Prescriptions     Pending Prescriptions Disp Refills   • Sertraline HCl 50 MG Oral Tab 90 tablet 0     Sig: Take 1 tablet (50 mg total) by mouth daily.        LOV: 10/23/19   Last Relevant Labs: 3/26/20  Filled: 6/14/19 #90 with 3 refills    [employer]ur

## 2020-07-04 ENCOUNTER — PATIENT MESSAGE (OUTPATIENT)
Dept: FAMILY MEDICINE CLINIC | Facility: CLINIC | Age: 60
End: 2020-07-04

## 2020-07-06 NOTE — TELEPHONE ENCOUNTER
From: Amanda Morataya  To: Casimiro Dempsey MD  Sent: 7/4/2020 2:08 AM CDT  Subject: Prescription Question    Thanks for the refill on Sertraline HCl 50 MG Tabs    It was sent to [facility] - could you send it to [facility] so I can do 90 day mail order?

## 2020-07-14 ENCOUNTER — LAB ENCOUNTER (OUTPATIENT)
Dept: LAB | Facility: HOSPITAL | Age: 60
End: 2020-07-14
Attending: INTERNAL MEDICINE
Payer: COMMERCIAL

## 2020-07-14 DIAGNOSIS — Z12.11 SCREEN FOR COLON CANCER: ICD-10-CM

## 2020-07-15 LAB — SARS-COV-2 RNA RESP QL NAA+PROBE: NOT DETECTED

## 2020-07-17 ENCOUNTER — HOSPITAL ENCOUNTER (OUTPATIENT)
Facility: HOSPITAL | Age: 60
Setting detail: HOSPITAL OUTPATIENT SURGERY
Discharge: HOME | End: 2020-07-17
Attending: INTERNAL MEDICINE | Admitting: INTERNAL MEDICINE
Payer: COMMERCIAL

## 2020-07-17 VITALS
WEIGHT: 285 LBS | HEART RATE: 97 BPM | TEMPERATURE: 97 F | HEIGHT: 66 IN | RESPIRATION RATE: 17 BRPM | OXYGEN SATURATION: 92 % | SYSTOLIC BLOOD PRESSURE: 156 MMHG | DIASTOLIC BLOOD PRESSURE: 85 MMHG | BODY MASS INDEX: 45.8 KG/M2

## 2020-07-17 DIAGNOSIS — Z12.11 SCREEN FOR COLON CANCER: Primary | ICD-10-CM

## 2020-07-17 DIAGNOSIS — K59.00 CONSTIPATION, UNSPECIFIED CONSTIPATION TYPE: ICD-10-CM

## 2020-07-17 PROCEDURE — 0DBM8ZX EXCISION OF DESCENDING COLON, VIA NATURAL OR ARTIFICIAL OPENING ENDOSCOPIC, DIAGNOSTIC: ICD-10-PCS | Performed by: INTERNAL MEDICINE

## 2020-07-17 PROCEDURE — 0DBP8ZX EXCISION OF RECTUM, VIA NATURAL OR ARTIFICIAL OPENING ENDOSCOPIC, DIAGNOSTIC: ICD-10-PCS | Performed by: INTERNAL MEDICINE

## 2020-07-17 PROCEDURE — 88305 TISSUE EXAM BY PATHOLOGIST: CPT | Performed by: INTERNAL MEDICINE

## 2020-07-17 PROCEDURE — 0DBK8ZX EXCISION OF ASCENDING COLON, VIA NATURAL OR ARTIFICIAL OPENING ENDOSCOPIC, DIAGNOSTIC: ICD-10-PCS | Performed by: INTERNAL MEDICINE

## 2020-07-17 RX ORDER — SODIUM CHLORIDE, SODIUM LACTATE, POTASSIUM CHLORIDE, CALCIUM CHLORIDE 600; 310; 30; 20 MG/100ML; MG/100ML; MG/100ML; MG/100ML
INJECTION, SOLUTION INTRAVENOUS CONTINUOUS
Status: DISCONTINUED | OUTPATIENT
Start: 2020-07-17 | End: 2020-07-17

## 2020-07-17 NOTE — H&P
History & Physical Examination    Patient Name: Merline Upton  MRN: [id number]  [facility]: [id number]  YOB: 1960    Diagnosis: screening for colon cancer    Present Illness: as above    Sertraline HCl 50 MG Oral Tab, Take 1 tablet (50 mg total) b vomiting)    • Visual impairment     glasses prn   • Vitamin D deficiency    • Wears glasses     cataract surgery right eye 2019     Past Surgical History:   Procedure Laterality Date   • BREAST BIOPSY      total of 5-6   • ENDOMETRIAL ABLATION  2005/6 & ~

## 2020-07-17 NOTE — OPERATIVE REPORT
Operative Report-Colonoscopy with snare polypectomy and cold biopsies    Zach Broussard 8/14/1960   [facility] [id number] MRN [id number]   Admission Date 7/17/2020 Operation Date 7/17/2020   Attending Physician Josefina Ojeda DO Operating Physician Katharyn Leyden Chica Michael  7/17/2020  11:09 AM

## 2021-04-08 ENCOUNTER — OFFICE VISIT (OUTPATIENT)
Dept: FAMILY MEDICINE CLINIC | Facility: CLINIC | Age: 61
End: 2021-04-08
Payer: COMMERCIAL

## 2021-04-08 VITALS
BODY MASS INDEX: 37.28 KG/M2 | SYSTOLIC BLOOD PRESSURE: 130 MMHG | TEMPERATURE: 98 F | HEIGHT: 66 IN | OXYGEN SATURATION: 98 % | DIASTOLIC BLOOD PRESSURE: 80 MMHG | HEART RATE: 58 BPM | RESPIRATION RATE: 16 BRPM | WEIGHT: 232 LBS

## 2021-04-08 DIAGNOSIS — Z11.59 ENCOUNTER FOR HEPATITIS C SCREENING TEST FOR LOW RISK PATIENT: ICD-10-CM

## 2021-04-08 DIAGNOSIS — R10.2 PELVIC PRESSURE IN FEMALE: ICD-10-CM

## 2021-04-08 DIAGNOSIS — Z00.00 LABORATORY EXAM ORDERED AS PART OF ROUTINE GENERAL MEDICAL EXAMINATION: ICD-10-CM

## 2021-04-08 DIAGNOSIS — Z78.0 ASYMPTOMATIC MENOPAUSAL STATE: ICD-10-CM

## 2021-04-08 DIAGNOSIS — Z00.00 ROUTINE MEDICAL EXAM: Primary | ICD-10-CM

## 2021-04-08 DIAGNOSIS — F32.4 MAJOR DEPRESSIVE DISORDER WITH SINGLE EPISODE, IN PARTIAL REMISSION (HCC): ICD-10-CM

## 2021-04-08 DIAGNOSIS — K59.00 CONSTIPATION, UNSPECIFIED CONSTIPATION TYPE: ICD-10-CM

## 2021-04-08 DIAGNOSIS — B35.4 TINEA CORPORIS: ICD-10-CM

## 2021-04-08 DIAGNOSIS — Z12.31 ENCOUNTER FOR SCREENING MAMMOGRAM FOR MALIGNANT NEOPLASM OF BREAST: ICD-10-CM

## 2021-04-08 PROCEDURE — 99213 OFFICE O/P EST LOW 20 MIN: CPT | Performed by: FAMILY MEDICINE

## 2021-04-08 PROCEDURE — 3079F DIAST BP 80-89 MM HG: CPT | Performed by: FAMILY MEDICINE

## 2021-04-08 PROCEDURE — 3008F BODY MASS INDEX DOCD: CPT | Performed by: FAMILY MEDICINE

## 2021-04-08 PROCEDURE — 99396 PREV VISIT EST AGE 40-64: CPT | Performed by: FAMILY MEDICINE

## 2021-04-08 PROCEDURE — 3075F SYST BP GE 130 - 139MM HG: CPT | Performed by: FAMILY MEDICINE

## 2021-04-08 RX ORDER — POLYETHYLENE GLYCOL 3350 17 G/17G
17 POWDER, FOR SOLUTION ORAL DAILY PRN
COMMUNITY

## 2021-04-08 RX ORDER — NYSTATIN 100000 U/G
1 CREAM TOPICAL 2 TIMES DAILY
Qty: 30 G | Refills: 2 | Status: SHIPPED | OUTPATIENT
Start: 2021-04-08 | End: 2021-04-22

## 2021-04-08 RX ORDER — ZINC OXIDE 13 %
1 CREAM (GRAM) TOPICAL DAILY
COMMUNITY

## 2021-04-08 NOTE — PROGRESS NOTES
Patient presents with:  Physical      HPI:  Axel Vasquez is a 63year old female here today for preventative visit.       Imms- up to date with both covid, both shingrix, tdap, and flu.        Cervical cancer screening- normal cotesting done on 4/11/201 feel the lows, but nor the highs either. Has tried to wean off quite a few times very gradually but gets a lot of dizziness. Skin irritation- under her panus, itching, irritated and can actually bleed due to the rubbing.  Applies hydrocortisone prn, bu mouth daily. Must keep appt in April, Disp: 90 tablet, Rfl: 0  docusate sodium (COLACE) 100 MG Oral Cap, Take 100 mg by mouth 2 (two) times daily. , Disp: , Rfl:   omega-3 fatty acids 1000 MG Oral Cap, Take 1,000 mg by mouth daily. , Disp: , Rfl:   Fexofen Asked        Exercise: Yes        Bike Helmet: Not Asked        Seat Belt: Not Asked        Self-Exams: Not Asked    Social History Narrative      Not on file    Social Determinants of Health  Financial Resource Strain:       Difficulty of Paying Living Ex turbinates. Mouth/Throat- Normal oral mucosa, throat non-erythematous. NECK:  No submental, submandibular, ant/post cervical lymphadenopathy. No thyromegaly or masses. PULMONARY:  Lungs clear to auscultation bilaterally. No wheezes, rales, or rhonchi. and agrees to plan. Return in about 1 year (around 4/8/2022) for annual physical sooner as needed, we will call with results.

## 2021-04-21 ENCOUNTER — LAB ENCOUNTER (OUTPATIENT)
Dept: LAB | Age: 61
End: 2021-04-21
Attending: FAMILY MEDICINE
Payer: COMMERCIAL

## 2021-04-21 DIAGNOSIS — Z00.00 LABORATORY EXAM ORDERED AS PART OF ROUTINE GENERAL MEDICAL EXAMINATION: ICD-10-CM

## 2021-04-21 DIAGNOSIS — Z11.59 ENCOUNTER FOR HEPATITIS C SCREENING TEST FOR LOW RISK PATIENT: ICD-10-CM

## 2021-04-21 PROCEDURE — 85025 COMPLETE CBC W/AUTO DIFF WBC: CPT | Performed by: FAMILY MEDICINE

## 2021-04-21 PROCEDURE — 80053 COMPREHEN METABOLIC PANEL: CPT | Performed by: FAMILY MEDICINE

## 2021-04-21 PROCEDURE — 80061 LIPID PANEL: CPT | Performed by: FAMILY MEDICINE

## 2021-04-21 PROCEDURE — 86803 HEPATITIS C AB TEST: CPT | Performed by: FAMILY MEDICINE

## 2021-06-16 ENCOUNTER — TELEPHONE (OUTPATIENT)
Dept: FAMILY MEDICINE CLINIC | Facility: CLINIC | Age: 61
End: 2021-06-16

## 2021-06-16 NOTE — TELEPHONE ENCOUNTER
Received via fax the bilateral screening mammogram report done on 6/15/21 at [address] that's located at [address] [facility] in [facility]. Health Maintenance updated & report placed in Dr. Janine Joy in box for review.

## 2021-11-12 ENCOUNTER — TELEMEDICINE (OUTPATIENT)
Dept: FAMILY MEDICINE CLINIC | Facility: CLINIC | Age: 61
End: 2021-11-12

## 2021-11-12 DIAGNOSIS — Z63.79 STRESS DUE TO ILLNESS OF FAMILY MEMBER: Primary | ICD-10-CM

## 2021-11-12 DIAGNOSIS — F41.9 ANXIETY: ICD-10-CM

## 2021-11-12 DIAGNOSIS — F41.0 PANIC ATTACK: ICD-10-CM

## 2021-11-12 PROCEDURE — 99214 OFFICE O/P EST MOD 30 MIN: CPT | Performed by: FAMILY MEDICINE

## 2021-11-12 RX ORDER — ALPRAZOLAM 0.25 MG/1
0.25 TABLET ORAL DAILY PRN
Qty: 15 TABLET | Refills: 0 | Status: SHIPPED | OUTPATIENT
Start: 2021-11-12

## 2021-11-12 NOTE — PROGRESS NOTES
Video Visit- Amber Godinez  This is a telemedicine visit with live, interactive video and audio.      David Ambrocio understands and accepts financial responsibility for any deductible, co-insurance and/or co-pay Right 10/2019    Dr. Janora Pallas   • COLONOSCOPY N/A 7/17/2020    repeat 5 yrs, tubular adenoma, Vanessa Patel DO;  Location: [facility] ENDOSCOPY   • ENDOMETRIAL ABLATION  2005/6 & ~2014 2/2 DUB   • SANKET LOCALIZATION WIRE 1 SITE LEFT (CPT=19281) Left 2010 attacks. Roosevelt Peterson MD      Please note that the following visit was completed using two-way, real-time interactive audio and visual communication.  This has been done in good jamin to provide continuity of care in the best interest of the pro

## 2021-11-21 ENCOUNTER — PATIENT MESSAGE (OUTPATIENT)
Dept: FAMILY MEDICINE CLINIC | Facility: CLINIC | Age: 61
End: 2021-11-21

## 2021-11-21 DIAGNOSIS — F32.4 MAJOR DEPRESSIVE DISORDER WITH SINGLE EPISODE, IN PARTIAL REMISSION (HCC): ICD-10-CM

## 2021-11-22 RX ORDER — SERTRALINE HYDROCHLORIDE 100 MG/1
100 TABLET, FILM COATED ORAL DAILY
Qty: 30 TABLET | Refills: 0 | Status: SHIPPED | OUTPATIENT
Start: 2021-11-22

## 2021-11-22 NOTE — TELEPHONE ENCOUNTER
From: Moses Collier  To:  Raysa Ta MD  Sent: 11/21/2021 10:37 PM CST  Subject: Sertraline Dosage Increase    On my last visit you suggested increasing my sertraline amount to aid with my increased anxiety/panic due to husbands cancer diagnosis

## 2022-04-27 PROBLEM — Z80.3 FAMILY HISTORY OF BREAST CANCER IN MOTHER: Status: ACTIVE | Noted: 2022-04-27

## 2022-04-27 PROBLEM — E66.01 MORBID OBESITY WITH BMI OF 45.0-49.9, ADULT (HCC): Status: ACTIVE | Noted: 2022-04-27

## 2022-04-27 PROBLEM — Z98.890 H/O LEFT BREAST BIOPSY: Status: ACTIVE | Noted: 2022-04-27

## 2022-04-27 PROBLEM — D13.30 TUBULAR ADENOMA OF SMALL INTESTINE: Status: ACTIVE | Noted: 2022-04-27

## 2022-04-27 PROBLEM — K21.9 GASTROESOPHAGEAL REFLUX DISEASE: Status: ACTIVE | Noted: 2022-04-27

## 2022-04-27 PROBLEM — Z98.41 HISTORY OF CATARACT EXTRACTION, RIGHT: Status: ACTIVE | Noted: 2022-04-27

## 2022-04-28 NOTE — PROGRESS NOTES
Pt scheduled a follow up    Future Appointments   Date Time Provider Judson Iverson   5/2/2022  2:00 PM REF [facility] [place]   6/21/2022  1:30 PM Maria L Erickson DO EMG 11 [facility] [place]   7/11/2022  9:30 AM Maria L Erickson DO EMG 11 [facility] [employer]

## 2022-05-03 ENCOUNTER — LAB ENCOUNTER (OUTPATIENT)
Dept: LAB | Age: 62
End: 2022-05-03
Attending: FAMILY MEDICINE
Payer: COMMERCIAL

## 2022-05-03 DIAGNOSIS — Z13.228 SCREENING FOR ENDOCRINE, METABOLIC AND IMMUNITY DISORDER: ICD-10-CM

## 2022-05-03 DIAGNOSIS — Z13.89 SCREENING FOR GENITOURINARY CONDITION: ICD-10-CM

## 2022-05-03 DIAGNOSIS — Z13.29 SCREENING FOR ENDOCRINE, METABOLIC AND IMMUNITY DISORDER: ICD-10-CM

## 2022-05-03 DIAGNOSIS — E78.5 DYSLIPIDEMIA: ICD-10-CM

## 2022-05-03 DIAGNOSIS — Z79.899 MEDICATION MANAGEMENT: ICD-10-CM

## 2022-05-03 DIAGNOSIS — Z00.00 LABORATORY EXAMINATION ORDERED AS PART OF A ROUTINE GENERAL MEDICAL EXAMINATION: ICD-10-CM

## 2022-05-03 DIAGNOSIS — E55.9 VITAMIN D DEFICIENCY: ICD-10-CM

## 2022-05-03 DIAGNOSIS — Z13.0 SCREENING FOR ENDOCRINE, METABOLIC AND IMMUNITY DISORDER: ICD-10-CM

## 2022-05-03 LAB
ALBUMIN SERPL-MCNC: 3.6 G/DL (ref 3.4–5)
ALBUMIN/GLOB SERPL: 0.9 {RATIO} (ref 1–2)
ALP LIVER SERPL-CCNC: 86 U/L
ALT SERPL-CCNC: 25 U/L
ANION GAP SERPL CALC-SCNC: 4 MMOL/L (ref 0–18)
AST SERPL-CCNC: 25 U/L (ref 15–37)
BASOPHILS # BLD AUTO: 0.03 X10(3) UL (ref 0–0.2)
BASOPHILS NFR BLD AUTO: 0.5 %
BILIRUB SERPL-MCNC: 0.4 MG/DL (ref 0.1–2)
BILIRUB UR QL STRIP.AUTO: NEGATIVE
BUN BLD-MCNC: 23 MG/DL (ref 7–18)
CALCIUM BLD-MCNC: 9.4 MG/DL (ref 8.5–10.1)
CHLORIDE SERPL-SCNC: 107 MMOL/L (ref 98–112)
CHOLEST SERPL-MCNC: 364 MG/DL (ref ?–200)
CO2 SERPL-SCNC: 28 MMOL/L (ref 21–32)
COLOR UR AUTO: YELLOW
CREAT BLD-MCNC: 1 MG/DL
EOSINOPHIL # BLD AUTO: 0.14 X10(3) UL (ref 0–0.7)
EOSINOPHIL NFR BLD AUTO: 2.3 %
ERYTHROCYTE [DISTWIDTH] IN BLOOD BY AUTOMATED COUNT: 14.5 %
FASTING PATIENT LIPID ANSWER: NO
FASTING STATUS PATIENT QL REPORTED: NO
GLOBULIN PLAS-MCNC: 3.9 G/DL (ref 2.8–4.4)
GLUCOSE BLD-MCNC: 122 MG/DL (ref 70–99)
GLUCOSE UR STRIP.AUTO-MCNC: NEGATIVE MG/DL
HAV AB SER QL IA: NONREACTIVE
HBV SURFACE AB SER QL: NONREACTIVE
HBV SURFACE AB SERPL IA-ACNC: <3.1 MIU/ML
HCT VFR BLD AUTO: 43.5 %
HDLC SERPL-MCNC: 52 MG/DL (ref 40–59)
HGB BLD-MCNC: 14.2 G/DL
IMM GRANULOCYTES # BLD AUTO: 0.01 X10(3) UL (ref 0–1)
IMM GRANULOCYTES NFR BLD: 0.2 %
KETONES UR STRIP.AUTO-MCNC: NEGATIVE MG/DL
LDLC SERPL CALC-MCNC: 273 MG/DL (ref ?–100)
LYMPHOCYTES # BLD AUTO: 2.45 X10(3) UL (ref 1–4)
LYMPHOCYTES NFR BLD AUTO: 39.4 %
MCH RBC QN AUTO: 30.4 PG (ref 26–34)
MCHC RBC AUTO-ENTMCNC: 32.6 G/DL (ref 31–37)
MCV RBC AUTO: 93.1 FL
MONOCYTES # BLD AUTO: 0.47 X10(3) UL (ref 0.1–1)
MONOCYTES NFR BLD AUTO: 7.6 %
NEUTROPHILS # BLD AUTO: 3.12 X10 (3) UL (ref 1.5–7.7)
NEUTROPHILS # BLD AUTO: 3.12 X10(3) UL (ref 1.5–7.7)
NEUTROPHILS NFR BLD AUTO: 50 %
NITRITE UR QL STRIP.AUTO: NEGATIVE
NONHDLC SERPL-MCNC: 312 MG/DL (ref ?–130)
OSMOLALITY SERPL CALC.SUM OF ELEC: 293 MOSM/KG (ref 275–295)
PH UR STRIP.AUTO: 5 [PH] (ref 5–8)
PLATELET # BLD AUTO: 224 10(3)UL (ref 150–450)
POTASSIUM SERPL-SCNC: 4.8 MMOL/L (ref 3.5–5.1)
PROT SERPL-MCNC: 7.5 G/DL (ref 6.4–8.2)
PROT UR STRIP.AUTO-MCNC: NEGATIVE MG/DL
RBC # BLD AUTO: 4.67 X10(6)UL
RBC UR QL AUTO: NEGATIVE
RUBV IGG SER QL: POSITIVE
RUBV IGG SER-ACNC: >500 IU/ML (ref 10–?)
SODIUM SERPL-SCNC: 139 MMOL/L (ref 136–145)
SP GR UR STRIP.AUTO: 1.02 (ref 1–1.03)
TRIGL SERPL-MCNC: 191 MG/DL (ref 30–149)
TSI SER-ACNC: 1.54 MIU/ML (ref 0.36–3.74)
UROBILINOGEN UR STRIP.AUTO-MCNC: <2 MG/DL
VIT D+METAB SERPL-MCNC: 29.3 NG/ML (ref 30–100)
VLDLC SERPL CALC-MCNC: 48 MG/DL (ref 0–30)
WBC # BLD AUTO: 6.2 X10(3) UL (ref 4–11)

## 2022-05-03 PROCEDURE — 80050 GENERAL HEALTH PANEL: CPT | Performed by: FAMILY MEDICINE

## 2022-05-03 PROCEDURE — 86706 HEP B SURFACE ANTIBODY: CPT | Performed by: FAMILY MEDICINE

## 2022-05-03 PROCEDURE — 86765 RUBEOLA ANTIBODY: CPT | Performed by: FAMILY MEDICINE

## 2022-05-03 PROCEDURE — 86735 MUMPS ANTIBODY: CPT | Performed by: FAMILY MEDICINE

## 2022-05-03 PROCEDURE — 86708 HEPATITIS A ANTIBODY: CPT | Performed by: FAMILY MEDICINE

## 2022-05-03 PROCEDURE — 86762 RUBELLA ANTIBODY: CPT | Performed by: FAMILY MEDICINE

## 2022-05-03 PROCEDURE — 81001 URINALYSIS AUTO W/SCOPE: CPT | Performed by: FAMILY MEDICINE

## 2022-05-03 PROCEDURE — 82306 VITAMIN D 25 HYDROXY: CPT | Performed by: FAMILY MEDICINE

## 2022-05-03 PROCEDURE — 80061 LIPID PANEL: CPT | Performed by: FAMILY MEDICINE

## 2022-05-04 LAB
MEV IGG SER-ACNC: <5 AU/ML (ref 16.5–?)
MUV IGG SER IA-ACNC: 154 AU/ML (ref 11–?)

## 2022-06-21 ENCOUNTER — OFFICE VISIT (OUTPATIENT)
Dept: FAMILY MEDICINE CLINIC | Facility: CLINIC | Age: 62
End: 2022-06-21
Payer: COMMERCIAL

## 2022-06-21 VITALS
HEIGHT: 66.75 IN | RESPIRATION RATE: 16 BRPM | HEART RATE: 74 BPM | SYSTOLIC BLOOD PRESSURE: 146 MMHG | OXYGEN SATURATION: 97 % | BODY MASS INDEX: 45.99 KG/M2 | DIASTOLIC BLOOD PRESSURE: 78 MMHG | TEMPERATURE: 97 F | WEIGHT: 293 LBS

## 2022-06-21 DIAGNOSIS — E78.5 DYSLIPIDEMIA: ICD-10-CM

## 2022-06-21 DIAGNOSIS — K21.9 GASTROESOPHAGEAL REFLUX DISEASE, UNSPECIFIED WHETHER ESOPHAGITIS PRESENT: ICD-10-CM

## 2022-06-21 DIAGNOSIS — Z23 NEED FOR VACCINATION: ICD-10-CM

## 2022-06-21 DIAGNOSIS — F33.1 MODERATE EPISODE OF RECURRENT MAJOR DEPRESSIVE DISORDER (HCC): ICD-10-CM

## 2022-06-21 DIAGNOSIS — Z63.79 STRESS DUE TO ILLNESS OF FAMILY MEMBER: ICD-10-CM

## 2022-06-21 DIAGNOSIS — Z98.890 H/O LEFT BREAST BIOPSY: ICD-10-CM

## 2022-06-21 DIAGNOSIS — I10 ESSENTIAL HYPERTENSION: Primary | ICD-10-CM

## 2022-06-21 DIAGNOSIS — M54.2 ANTERIOR NECK PAIN: ICD-10-CM

## 2022-06-21 DIAGNOSIS — R73.9 HYPERGLYCEMIA: ICD-10-CM

## 2022-06-21 DIAGNOSIS — F41.9 ANXIETY: ICD-10-CM

## 2022-06-21 DIAGNOSIS — Z71.85 VACCINE COUNSELING: ICD-10-CM

## 2022-06-21 DIAGNOSIS — Z80.3 FAMILY HISTORY OF BREAST CANCER IN MOTHER: ICD-10-CM

## 2022-06-21 DIAGNOSIS — Z79.899 MEDICATION MANAGEMENT: ICD-10-CM

## 2022-06-21 DIAGNOSIS — E66.01 MORBID OBESITY WITH BMI OF 45.0-49.9, ADULT (HCC): ICD-10-CM

## 2022-06-21 DIAGNOSIS — E55.9 VITAMIN D DEFICIENCY: ICD-10-CM

## 2022-06-21 PROCEDURE — 3078F DIAST BP <80 MM HG: CPT | Performed by: FAMILY MEDICINE

## 2022-06-21 PROCEDURE — 3077F SYST BP >= 140 MM HG: CPT | Performed by: FAMILY MEDICINE

## 2022-06-21 PROCEDURE — 99215 OFFICE O/P EST HI 40 MIN: CPT | Performed by: FAMILY MEDICINE

## 2022-06-21 PROCEDURE — 90472 IMMUNIZATION ADMIN EACH ADD: CPT | Performed by: FAMILY MEDICINE

## 2022-06-21 PROCEDURE — 90632 HEPA VACCINE ADULT IM: CPT | Performed by: FAMILY MEDICINE

## 2022-06-21 PROCEDURE — 90746 HEPB VACCINE 3 DOSE ADULT IM: CPT | Performed by: FAMILY MEDICINE

## 2022-06-21 PROCEDURE — 90707 MMR VACCINE SC: CPT | Performed by: FAMILY MEDICINE

## 2022-06-21 PROCEDURE — 90471 IMMUNIZATION ADMIN: CPT | Performed by: FAMILY MEDICINE

## 2022-06-21 PROCEDURE — 3008F BODY MASS INDEX DOCD: CPT | Performed by: FAMILY MEDICINE

## 2022-06-21 RX ORDER — VALSARTAN 160 MG/1
160 TABLET ORAL DAILY
Qty: 30 TABLET | Refills: 2 | Status: SHIPPED | OUTPATIENT
Start: 2022-06-21 | End: 2022-06-22

## 2022-06-21 RX ORDER — SERTRALINE HYDROCHLORIDE 25 MG/1
75 TABLET, FILM COATED ORAL DAILY
Qty: 270 TABLET | Refills: 0 | Status: SHIPPED | OUTPATIENT
Start: 2022-06-21 | End: 2022-09-19

## 2022-06-22 ENCOUNTER — PATIENT MESSAGE (OUTPATIENT)
Dept: FAMILY MEDICINE CLINIC | Facility: CLINIC | Age: 62
End: 2022-06-22

## 2022-06-22 ENCOUNTER — LAB ENCOUNTER (OUTPATIENT)
Dept: LAB | Age: 62
End: 2022-06-22
Attending: FAMILY MEDICINE
Payer: COMMERCIAL

## 2022-06-22 DIAGNOSIS — R73.9 HYPERGLYCEMIA: ICD-10-CM

## 2022-06-22 DIAGNOSIS — M54.2 ANTERIOR NECK PAIN: ICD-10-CM

## 2022-06-22 LAB
EST. AVERAGE GLUCOSE BLD GHB EST-MCNC: 126 MG/DL (ref 68–126)
HBA1C MFR BLD: 6 % (ref ?–5.7)
T4 FREE SERPL-MCNC: 0.8 NG/DL (ref 0.8–1.7)
T4 SERPL-MCNC: 8.1 UG/DL
TSI SER-ACNC: 1.27 MIU/ML (ref 0.36–3.74)

## 2022-06-22 PROCEDURE — 86800 THYROGLOBULIN ANTIBODY: CPT

## 2022-06-22 PROCEDURE — 83036 HEMOGLOBIN GLYCOSYLATED A1C: CPT

## 2022-06-22 PROCEDURE — 84439 ASSAY OF FREE THYROXINE: CPT

## 2022-06-22 PROCEDURE — 86376 MICROSOMAL ANTIBODY EACH: CPT

## 2022-06-22 PROCEDURE — 84443 ASSAY THYROID STIM HORMONE: CPT

## 2022-06-22 RX ORDER — VALSARTAN 160 MG/1
160 TABLET ORAL DAILY
Qty: 30 TABLET | Refills: 2 | Status: SHIPPED | OUTPATIENT
Start: 2022-06-22

## 2022-06-22 NOTE — TELEPHONE ENCOUNTER
From: Matthew An  To: Maria L Erickson DO  Sent: 6/22/2022 12:25 AM CDT  Subject: Valsartan Prescription    [facility] ([facility] [place]) did not get prescription for the Valsartan. Will you resend it to them?    Thank you

## 2022-06-23 LAB
THYROGLOB SERPL-MCNC: <15 U/ML (ref ?–60)
THYROPEROXIDASE AB SERPL-ACNC: <28 U/ML (ref ?–60)

## 2022-06-23 NOTE — PROGRESS NOTES
Dear Coreen Perea,    Your thyroid blood work is within normal limits. Your A1c shows that you are in the middle of the prediabetes range. Continue to focus on diet, exercise, weight loss.     Sincerely,  Dr. Pam Yang

## 2022-07-11 ENCOUNTER — OFFICE VISIT (OUTPATIENT)
Dept: FAMILY MEDICINE CLINIC | Facility: CLINIC | Age: 62
End: 2022-07-11
Payer: COMMERCIAL

## 2022-07-11 ENCOUNTER — TELEPHONE (OUTPATIENT)
Dept: FAMILY MEDICINE CLINIC | Facility: CLINIC | Age: 62
End: 2022-07-11

## 2022-07-11 VITALS
DIASTOLIC BLOOD PRESSURE: 72 MMHG | BODY MASS INDEX: 45.99 KG/M2 | WEIGHT: 293 LBS | TEMPERATURE: 97 F | RESPIRATION RATE: 16 BRPM | OXYGEN SATURATION: 97 % | HEART RATE: 74 BPM | SYSTOLIC BLOOD PRESSURE: 136 MMHG | HEIGHT: 66.75 IN

## 2022-07-11 DIAGNOSIS — R73.03 PREDIABETES: ICD-10-CM

## 2022-07-11 DIAGNOSIS — Z00.00 ROUTINE GENERAL MEDICAL EXAMINATION AT A HEALTH CARE FACILITY: Primary | ICD-10-CM

## 2022-07-11 DIAGNOSIS — E55.9 VITAMIN D DEFICIENCY: ICD-10-CM

## 2022-07-11 DIAGNOSIS — E78.5 DYSLIPIDEMIA: ICD-10-CM

## 2022-07-11 DIAGNOSIS — M54.2 ANTERIOR NECK PAIN: ICD-10-CM

## 2022-07-11 DIAGNOSIS — L98.9 SKIN LESION ON EXAMINATION: ICD-10-CM

## 2022-07-11 DIAGNOSIS — Z71.85 VACCINE COUNSELING: ICD-10-CM

## 2022-07-11 DIAGNOSIS — Z12.31 ENCOUNTER FOR SCREENING MAMMOGRAM FOR MALIGNANT NEOPLASM OF BREAST: ICD-10-CM

## 2022-07-11 PROCEDURE — 99396 PREV VISIT EST AGE 40-64: CPT | Performed by: FAMILY MEDICINE

## 2022-07-11 PROCEDURE — 3078F DIAST BP <80 MM HG: CPT | Performed by: FAMILY MEDICINE

## 2022-07-11 PROCEDURE — 3008F BODY MASS INDEX DOCD: CPT | Performed by: FAMILY MEDICINE

## 2022-07-11 PROCEDURE — 3075F SYST BP GE 130 - 139MM HG: CPT | Performed by: FAMILY MEDICINE

## 2022-07-11 RX ORDER — SERTRALINE HYDROCHLORIDE 150 MG/1
1 CAPSULE ORAL DAILY
Qty: 90 CAPSULE | Refills: 0 | Status: SHIPPED | OUTPATIENT
Start: 2022-07-11

## 2022-07-11 RX ORDER — OMEPRAZOLE 40 MG/1
40 CAPSULE, DELAYED RELEASE ORAL DAILY
Qty: 90 CAPSULE | Refills: 1 | Status: SHIPPED | OUTPATIENT
Start: 2022-07-11

## 2022-07-11 NOTE — TELEPHONE ENCOUNTER
Pt's insurance has denied the request for CT Neck. The following is the denial reasoning received from [facility]:    Your doctor told us that you have a choking sensation. Your doctor ordered a CT scan of your neck. A CT is a way to take pictures of the inside of your body. This test is done to look for the cause of a hoarse voice or loud breathing after an exam of the back of your throat, including your voice box, and xray, has not explained the reason for the symptoms. Your doctor did not tell us that you have had an exam of the back of your throat. Based on the information we have, this test is not medically necessary for you. We used [facility] Guideline titled Imaging of the Head and Neck to make this decision. You may view this guideline at [email or web address]. If you would like a P2P, please call [facility] @ [phone]. Member ID # [id number]. Thank you.

## 2022-07-14 ENCOUNTER — MED REC SCAN ONLY (OUTPATIENT)
Dept: FAMILY MEDICINE CLINIC | Facility: CLINIC | Age: 62
End: 2022-07-14

## 2022-07-14 NOTE — TELEPHONE ENCOUNTER
From: René Pruitt  To: Maria L Erickson DO  Sent: 7/14/2022 1:17 PM CDT  Subject: Sertraline Prescription     [facility] did not get prescription for 150mg Sertraline. Will you please send it to [facility] instead? (I am out of town for [employer]).  Thank you

## 2022-07-15 NOTE — TELEPHONE ENCOUNTER
Spoke to patient. She said insurance most likely does not want to cover the Sertraline 150 mg capsules. She is ok with taking 100 mg tabs take 1 1/5 daily. Rx pended to [facility].

## 2022-08-03 ENCOUNTER — HOSPITAL ENCOUNTER (OUTPATIENT)
Dept: ULTRASOUND IMAGING | Age: 62
Discharge: HOME | End: 2022-08-03
Attending: FAMILY MEDICINE
Payer: COMMERCIAL

## 2022-08-03 DIAGNOSIS — M54.2 ANTERIOR NECK PAIN: ICD-10-CM

## 2022-08-03 PROCEDURE — 76536 US EXAM OF HEAD AND NECK: CPT | Performed by: FAMILY MEDICINE

## 2022-10-05 ENCOUNTER — OFFICE VISIT (OUTPATIENT)
Dept: FAMILY MEDICINE CLINIC | Facility: CLINIC | Age: 62
End: 2022-10-05
Payer: COMMERCIAL

## 2022-10-05 VITALS
BODY MASS INDEX: 45.99 KG/M2 | SYSTOLIC BLOOD PRESSURE: 126 MMHG | WEIGHT: 293 LBS | HEIGHT: 66.75 IN | DIASTOLIC BLOOD PRESSURE: 72 MMHG | RESPIRATION RATE: 16 BRPM | HEART RATE: 70 BPM | OXYGEN SATURATION: 95 %

## 2022-10-05 DIAGNOSIS — D17.1 LIPOMA OF TORSO: ICD-10-CM

## 2022-10-05 DIAGNOSIS — H91.93 BILATERAL HEARING LOSS, UNSPECIFIED HEARING LOSS TYPE: ICD-10-CM

## 2022-10-05 DIAGNOSIS — F33.1 MODERATE EPISODE OF RECURRENT MAJOR DEPRESSIVE DISORDER (HCC): ICD-10-CM

## 2022-10-05 DIAGNOSIS — E55.9 VITAMIN D DEFICIENCY: ICD-10-CM

## 2022-10-05 DIAGNOSIS — M54.2 NECK PAIN: Primary | ICD-10-CM

## 2022-10-05 DIAGNOSIS — E66.01 MORBID OBESITY WITH BMI OF 45.0-49.9, ADULT (HCC): ICD-10-CM

## 2022-10-05 DIAGNOSIS — E78.5 DYSLIPIDEMIA: ICD-10-CM

## 2022-10-05 DIAGNOSIS — Z71.85 VACCINE COUNSELING: ICD-10-CM

## 2022-10-05 DIAGNOSIS — R73.03 PREDIABETES: ICD-10-CM

## 2022-10-05 PROCEDURE — 99214 OFFICE O/P EST MOD 30 MIN: CPT | Performed by: FAMILY MEDICINE

## 2022-10-05 PROCEDURE — 3074F SYST BP LT 130 MM HG: CPT | Performed by: FAMILY MEDICINE

## 2022-10-05 PROCEDURE — 3008F BODY MASS INDEX DOCD: CPT | Performed by: FAMILY MEDICINE

## 2022-10-05 PROCEDURE — 3078F DIAST BP <80 MM HG: CPT | Performed by: FAMILY MEDICINE

## 2022-10-05 RX ORDER — BUPROPION HYDROCHLORIDE 150 MG/1
150 TABLET ORAL DAILY
Qty: 30 TABLET | Refills: 3 | Status: SHIPPED | OUTPATIENT
Start: 2022-10-05

## 2022-10-05 RX ORDER — VALSARTAN 160 MG/1
160 TABLET ORAL DAILY
Qty: 90 TABLET | Refills: 1 | Status: SHIPPED | OUTPATIENT
Start: 2022-10-05

## 2022-11-06 ENCOUNTER — PATIENT MESSAGE (OUTPATIENT)
Dept: FAMILY MEDICINE CLINIC | Facility: CLINIC | Age: 62
End: 2022-11-06

## 2022-11-06 DIAGNOSIS — M54.2 ANTERIOR NECK PAIN: Primary | ICD-10-CM

## 2022-11-07 NOTE — TELEPHONE ENCOUNTER
From: Mesfin Ocampo  To: Luis Manuel Moore DO  Sent: 11/6/2022 10:33 PM CST  Subject: Head/Neck CT Scan    I still need to get a head/neck CT scan for my neck pain. Only slight improvement over the past month doing the stretches. As you know Ultrasound showed nothing but something is still going on. Can we please try to get the CT scan again?  I'd like to get it done before my next appt with you on 11/30  Thank you   Bradford Castro

## 2022-11-15 ENCOUNTER — HOSPITAL ENCOUNTER (OUTPATIENT)
Dept: CT IMAGING | Facility: HOSPITAL | Age: 62
Discharge: HOME | End: 2022-11-15
Attending: FAMILY MEDICINE
Payer: COMMERCIAL

## 2022-11-15 DIAGNOSIS — M54.2 ANTERIOR NECK PAIN: ICD-10-CM

## 2022-11-15 PROCEDURE — 70491 CT SOFT TISSUE NECK W/DYE: CPT | Performed by: FAMILY MEDICINE

## 2022-11-15 PROCEDURE — 82565 ASSAY OF CREATININE: CPT

## 2022-11-15 RX ORDER — IOHEXOL 350 MG/ML
50 INJECTION, SOLUTION INTRAVENOUS
Status: COMPLETED | OUTPATIENT
Start: 2022-11-15 | End: 2022-11-15

## 2022-11-15 RX ADMIN — IOHEXOL 50 ML: 350 INJECTION, SOLUTION INTRAVENOUS at 19:31:00

## 2022-11-16 ENCOUNTER — PATIENT MESSAGE (OUTPATIENT)
Dept: FAMILY MEDICINE CLINIC | Facility: CLINIC | Age: 62
End: 2022-11-16

## 2022-11-16 DIAGNOSIS — M54.2 ANTERIOR NECK PAIN: Primary | ICD-10-CM

## 2022-11-16 DIAGNOSIS — K11.8 PAROTID NODULE: ICD-10-CM

## 2022-11-16 LAB
CREAT BLD-MCNC: 1 MG/DL
GFR SERPLBLD BASED ON 1.73 SQ M-ARVRAT: 63 ML/MIN/1.73M2 (ref 60–?)

## 2022-11-16 NOTE — TELEPHONE ENCOUNTER
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 How do I get that done?

## 2022-11-18 ENCOUNTER — ORDER TRANSCRIPTION (OUTPATIENT)
Dept: ADMINISTRATIVE | Facility: HOSPITAL | Age: 62
End: 2022-11-18

## 2022-11-18 ENCOUNTER — OFFICE VISIT (OUTPATIENT)
Dept: OTOLARYNGOLOGY | Facility: CLINIC | Age: 62
End: 2022-11-18
Payer: COMMERCIAL

## 2022-11-18 VITALS — BODY MASS INDEX: 45.99 KG/M2 | WEIGHT: 293 LBS | HEIGHT: 66.75 IN

## 2022-11-18 DIAGNOSIS — Z13.6 SCREENING FOR CARDIOVASCULAR CONDITION: Primary | ICD-10-CM

## 2022-11-18 DIAGNOSIS — K11.8 MASS OF LEFT PAROTID GLAND: Primary | ICD-10-CM

## 2022-11-18 PROCEDURE — 3008F BODY MASS INDEX DOCD: CPT | Performed by: STUDENT IN AN ORGANIZED HEALTH CARE EDUCATION/TRAINING PROGRAM

## 2022-11-18 PROCEDURE — 99204 OFFICE O/P NEW MOD 45 MIN: CPT | Performed by: STUDENT IN AN ORGANIZED HEALTH CARE EDUCATION/TRAINING PROGRAM

## 2022-11-20 ENCOUNTER — LAB ENCOUNTER (OUTPATIENT)
Dept: LAB | Facility: HOSPITAL | Age: 62
End: 2022-11-20
Attending: STUDENT IN AN ORGANIZED HEALTH CARE EDUCATION/TRAINING PROGRAM
Payer: COMMERCIAL

## 2022-11-20 DIAGNOSIS — Z11.59 ENCOUNTER FOR SCREENING FOR OTHER VIRAL DISEASES: ICD-10-CM

## 2022-11-20 DIAGNOSIS — Z01.818 PRE-PROCEDURAL EXAMINATION: ICD-10-CM

## 2022-11-21 LAB — SARS-COV-2 RNA RESP QL NAA+PROBE: NOT DETECTED

## 2022-11-22 ENCOUNTER — LAB ENCOUNTER (OUTPATIENT)
Dept: LAB | Age: 62
End: 2022-11-22
Attending: FAMILY MEDICINE
Payer: COMMERCIAL

## 2022-11-22 DIAGNOSIS — E78.5 DYSLIPIDEMIA: ICD-10-CM

## 2022-11-22 DIAGNOSIS — E55.9 VITAMIN D DEFICIENCY: ICD-10-CM

## 2022-11-22 DIAGNOSIS — R73.03 PREDIABETES: ICD-10-CM

## 2022-11-22 LAB
ALBUMIN SERPL-MCNC: 3.5 G/DL (ref 3.4–5)
ALBUMIN/GLOB SERPL: 1 {RATIO} (ref 1–2)
ALP LIVER SERPL-CCNC: 98 U/L
ALT SERPL-CCNC: 25 U/L
ANION GAP SERPL CALC-SCNC: 7 MMOL/L (ref 0–18)
AST SERPL-CCNC: 19 U/L (ref 15–37)
BILIRUB SERPL-MCNC: 0.4 MG/DL (ref 0.1–2)
BUN BLD-MCNC: 18 MG/DL (ref 7–18)
BUN/CREAT SERPL: 16.2 (ref 10–20)
CALCIUM BLD-MCNC: 9.1 MG/DL (ref 8.5–10.1)
CHLORIDE SERPL-SCNC: 108 MMOL/L (ref 98–112)
CHOLEST SERPL-MCNC: 368 MG/DL (ref ?–200)
CO2 SERPL-SCNC: 27 MMOL/L (ref 21–32)
CREAT BLD-MCNC: 1.11 MG/DL
EST. AVERAGE GLUCOSE BLD GHB EST-MCNC: 120 MG/DL (ref 68–126)
FASTING PATIENT LIPID ANSWER: NO
FASTING STATUS PATIENT QL REPORTED: NO
GFR SERPLBLD BASED ON 1.73 SQ M-ARVRAT: 56 ML/MIN/1.73M2 (ref 60–?)
GLOBULIN PLAS-MCNC: 3.6 G/DL (ref 2.8–4.4)
GLUCOSE BLD-MCNC: 86 MG/DL (ref 70–99)
HBA1C MFR BLD: 5.8 % (ref ?–5.7)
HDLC SERPL-MCNC: 57 MG/DL (ref 40–59)
LDLC SERPL CALC-MCNC: 274 MG/DL (ref ?–100)
NONHDLC SERPL-MCNC: 311 MG/DL (ref ?–130)
OSMOLALITY SERPL CALC.SUM OF ELEC: 295 MOSM/KG (ref 275–295)
POTASSIUM SERPL-SCNC: 4.6 MMOL/L (ref 3.5–5.1)
PROT SERPL-MCNC: 7.1 G/DL (ref 6.4–8.2)
SODIUM SERPL-SCNC: 142 MMOL/L (ref 136–145)
TRIGL SERPL-MCNC: 184 MG/DL (ref 30–149)
VIT D+METAB SERPL-MCNC: 33.6 NG/ML (ref 30–100)
VLDLC SERPL CALC-MCNC: 46 MG/DL (ref 0–30)

## 2022-11-22 PROCEDURE — 80053 COMPREHEN METABOLIC PANEL: CPT

## 2022-11-22 PROCEDURE — 82306 VITAMIN D 25 HYDROXY: CPT

## 2022-11-22 PROCEDURE — 80061 LIPID PANEL: CPT

## 2022-11-22 PROCEDURE — 83036 HEMOGLOBIN GLYCOSYLATED A1C: CPT

## 2022-11-23 ENCOUNTER — NURSE ONLY (OUTPATIENT)
Dept: LAB | Facility: HOSPITAL | Age: 62
End: 2022-11-23
Attending: STUDENT IN AN ORGANIZED HEALTH CARE EDUCATION/TRAINING PROGRAM
Payer: COMMERCIAL

## 2022-11-23 ENCOUNTER — HOSPITAL ENCOUNTER (OUTPATIENT)
Dept: ULTRASOUND IMAGING | Facility: HOSPITAL | Age: 62
Discharge: HOME | End: 2022-11-23
Attending: STUDENT IN AN ORGANIZED HEALTH CARE EDUCATION/TRAINING PROGRAM
Payer: COMMERCIAL

## 2022-11-23 DIAGNOSIS — K11.8 MASS OF LEFT PAROTID GLAND: ICD-10-CM

## 2022-11-23 DIAGNOSIS — R73.03 PREDIABETES: ICD-10-CM

## 2022-11-23 PROCEDURE — 88173 CYTOPATH EVAL FNA REPORT: CPT | Performed by: STUDENT IN AN ORGANIZED HEALTH CARE EDUCATION/TRAINING PROGRAM

## 2022-11-23 PROCEDURE — 88172 CYTP DX EVAL FNA 1ST EA SITE: CPT | Performed by: STUDENT IN AN ORGANIZED HEALTH CARE EDUCATION/TRAINING PROGRAM

## 2022-11-23 PROCEDURE — 42400 BIOPSY OF SALIVARY GLAND: CPT | Performed by: STUDENT IN AN ORGANIZED HEALTH CARE EDUCATION/TRAINING PROGRAM

## 2022-11-23 PROCEDURE — 76942 ECHO GUIDE FOR BIOPSY: CPT | Performed by: STUDENT IN AN ORGANIZED HEALTH CARE EDUCATION/TRAINING PROGRAM

## 2022-11-29 ENCOUNTER — HOSPITAL ENCOUNTER (OUTPATIENT)
Dept: CT IMAGING | Age: 62
Discharge: HOME | End: 2022-11-29
Attending: FAMILY MEDICINE

## 2022-11-29 DIAGNOSIS — Z13.6 SCREENING FOR CARDIOVASCULAR CONDITION: ICD-10-CM

## 2022-11-29 DIAGNOSIS — Z71.89 ENCOUNTER FOR CARDIAC RISK COUNSELING: ICD-10-CM

## 2022-11-29 PROBLEM — I25.10 CORONARY ARTERY DISEASE INVOLVING NATIVE CORONARY ARTERY OF NATIVE HEART WITHOUT ANGINA PECTORIS: Status: ACTIVE | Noted: 2022-11-29

## 2022-11-29 PROBLEM — R93.1 ABNORMAL CT SCAN OF HEART: Status: ACTIVE | Noted: 2022-11-29

## 2022-11-29 NOTE — PROGRESS NOTES
Dear Netta Beckett,     This is an over read for the non-cardiac, non-vascular limited visualized portions of the chest and abdomen from your CT calcium scoring test. Everything is within normal limits. This test results comes back before the heart scan result. Please be patient for that result. I will send it over once I have received and reviewed it.      Sincerely,    Dr. Alycia Mccallum

## 2022-11-30 ENCOUNTER — OFFICE VISIT (OUTPATIENT)
Dept: FAMILY MEDICINE CLINIC | Facility: CLINIC | Age: 62
End: 2022-11-30
Payer: COMMERCIAL

## 2022-11-30 VITALS
OXYGEN SATURATION: 97 % | SYSTOLIC BLOOD PRESSURE: 126 MMHG | RESPIRATION RATE: 16 BRPM | DIASTOLIC BLOOD PRESSURE: 74 MMHG | HEIGHT: 66.75 IN | HEART RATE: 65 BPM | BODY MASS INDEX: 45.99 KG/M2 | WEIGHT: 293 LBS

## 2022-11-30 DIAGNOSIS — I25.10 CORONARY ARTERY DISEASE INVOLVING NATIVE CORONARY ARTERY OF NATIVE HEART WITHOUT ANGINA PECTORIS: Primary | ICD-10-CM

## 2022-11-30 DIAGNOSIS — Z79.899 MEDICATION MANAGEMENT: ICD-10-CM

## 2022-11-30 DIAGNOSIS — Z12.31 ENCOUNTER FOR SCREENING MAMMOGRAM FOR MALIGNANT NEOPLASM OF BREAST: ICD-10-CM

## 2022-11-30 DIAGNOSIS — E78.5 DYSLIPIDEMIA: ICD-10-CM

## 2022-11-30 DIAGNOSIS — K11.8 PAROTID NODULE: ICD-10-CM

## 2022-11-30 DIAGNOSIS — Z71.85 VACCINE COUNSELING: ICD-10-CM

## 2022-11-30 DIAGNOSIS — I10 ESSENTIAL HYPERTENSION: ICD-10-CM

## 2022-11-30 DIAGNOSIS — K21.9 GASTROESOPHAGEAL REFLUX DISEASE, UNSPECIFIED WHETHER ESOPHAGITIS PRESENT: ICD-10-CM

## 2022-11-30 DIAGNOSIS — R93.1 ABNORMAL CT SCAN OF HEART: ICD-10-CM

## 2022-11-30 DIAGNOSIS — F33.1 MODERATE EPISODE OF RECURRENT MAJOR DEPRESSIVE DISORDER (HCC): ICD-10-CM

## 2022-11-30 DIAGNOSIS — F41.9 ANXIETY: ICD-10-CM

## 2022-11-30 DIAGNOSIS — E66.01 MORBID OBESITY WITH BMI OF 45.0-49.9, ADULT (HCC): ICD-10-CM

## 2022-11-30 DIAGNOSIS — M54.2 ANTERIOR NECK PAIN: ICD-10-CM

## 2022-11-30 DIAGNOSIS — E55.9 VITAMIN D DEFICIENCY: ICD-10-CM

## 2022-11-30 DIAGNOSIS — H91.93 BILATERAL HEARING LOSS, UNSPECIFIED HEARING LOSS TYPE: ICD-10-CM

## 2022-11-30 DIAGNOSIS — Z23 NEED FOR VACCINATION: ICD-10-CM

## 2022-11-30 DIAGNOSIS — R73.03 PREDIABETES: ICD-10-CM

## 2022-11-30 PROCEDURE — 90471 IMMUNIZATION ADMIN: CPT | Performed by: FAMILY MEDICINE

## 2022-11-30 PROCEDURE — 3074F SYST BP LT 130 MM HG: CPT | Performed by: FAMILY MEDICINE

## 2022-11-30 PROCEDURE — 3008F BODY MASS INDEX DOCD: CPT | Performed by: FAMILY MEDICINE

## 2022-11-30 PROCEDURE — 90677 PCV20 VACCINE IM: CPT | Performed by: FAMILY MEDICINE

## 2022-11-30 PROCEDURE — 99215 OFFICE O/P EST HI 40 MIN: CPT | Performed by: FAMILY MEDICINE

## 2022-11-30 PROCEDURE — 3078F DIAST BP <80 MM HG: CPT | Performed by: FAMILY MEDICINE

## 2022-11-30 RX ORDER — EVOLOCUMAB 140 MG/ML
140 INJECTION, SOLUTION SUBCUTANEOUS
Qty: 2 ML | Refills: 3 | Status: SHIPPED | OUTPATIENT
Start: 2022-11-30 | End: 2022-12-30

## 2022-12-05 ENCOUNTER — TELEPHONE (OUTPATIENT)
Dept: FAMILY MEDICINE CLINIC | Facility: CLINIC | Age: 62
End: 2022-12-05

## 2022-12-06 ENCOUNTER — PATIENT MESSAGE (OUTPATIENT)
Dept: FAMILY MEDICINE CLINIC | Facility: CLINIC | Age: 62
End: 2022-12-06

## 2022-12-06 RX ORDER — ALIROCUMAB 75 MG/ML
75 INJECTION, SOLUTION SUBCUTANEOUS
Qty: 2 ML | Refills: 3 | Status: SHIPPED | OUTPATIENT
Start: 2022-12-06 | End: 2023-01-03

## 2022-12-06 NOTE — TELEPHONE ENCOUNTER
From: Jeramie Boothe  To: Magy Avalos DO  Sent: 12/6/2022 12:53 AM CST  Subject: Earnest Mon    My insurance does not cover Repatha but suggests as an alternative Praluent. Is that the same? If so, can I get a new prescription for that sent to [facility]?  Thank you

## 2022-12-13 ENCOUNTER — TELEPHONE (OUTPATIENT)
Dept: FAMILY MEDICINE CLINIC | Facility: CLINIC | Age: 62
End: 2022-12-13

## 2022-12-13 NOTE — TELEPHONE ENCOUNTER
Hoa Fonseca from [place] [address] is calling regarding the prior auth for the [address] would like a different medication prescribed, Please call Hoa Fonseca at [phone] he has a couple of questions

## 2022-12-29 RX ORDER — BUPROPION HYDROCHLORIDE 150 MG/1
150 TABLET ORAL DAILY
Qty: 90 TABLET | Refills: 1 | Status: SHIPPED | OUTPATIENT
Start: 2022-12-29

## 2022-12-30 PROBLEM — K11.8 MASS OF LEFT PAROTID GLAND: Status: ACTIVE | Noted: 2022-12-22

## 2023-01-30 PROBLEM — K63.89 POLYP OF SMALL INTESTINE: Status: ACTIVE | Noted: 2022-04-27

## 2023-01-30 PROBLEM — K11.8 OTHER DISEASES OF SALIVARY GLANDS: Status: ACTIVE | Noted: 2022-12-22

## 2023-03-13 ENCOUNTER — LAB ENCOUNTER (OUTPATIENT)
Dept: LAB | Age: 63
End: 2023-03-13
Attending: FAMILY MEDICINE
Payer: COMMERCIAL

## 2023-03-13 DIAGNOSIS — I10 ESSENTIAL HYPERTENSION: ICD-10-CM

## 2023-03-13 DIAGNOSIS — R73.03 PREDIABETES: ICD-10-CM

## 2023-03-13 DIAGNOSIS — I25.10 CORONARY ARTERY DISEASE INVOLVING NATIVE CORONARY ARTERY OF NATIVE HEART WITHOUT ANGINA PECTORIS: ICD-10-CM

## 2023-03-13 DIAGNOSIS — E78.5 DYSLIPIDEMIA: ICD-10-CM

## 2023-03-13 DIAGNOSIS — R93.1 ABNORMAL CT SCAN OF HEART: ICD-10-CM

## 2023-03-13 LAB
ALBUMIN SERPL-MCNC: 3.5 G/DL (ref 3.4–5)
ALBUMIN/GLOB SERPL: 0.9 {RATIO} (ref 1–2)
ALP LIVER SERPL-CCNC: 84 U/L
ALT SERPL-CCNC: 29 U/L
ANION GAP SERPL CALC-SCNC: 8 MMOL/L (ref 0–18)
AST SERPL-CCNC: 21 U/L (ref 15–37)
BILIRUB SERPL-MCNC: 0.5 MG/DL (ref 0.1–2)
BUN BLD-MCNC: 23 MG/DL (ref 7–18)
CALCIUM BLD-MCNC: 9.5 MG/DL (ref 8.5–10.1)
CHLORIDE SERPL-SCNC: 108 MMOL/L (ref 98–112)
CHOLEST SERPL-MCNC: 253 MG/DL (ref ?–200)
CO2 SERPL-SCNC: 25 MMOL/L (ref 21–32)
CREAT BLD-MCNC: 1.08 MG/DL
CREAT UR-SCNC: 287 MG/DL
EST. AVERAGE GLUCOSE BLD GHB EST-MCNC: 134 MG/DL (ref 68–126)
FASTING PATIENT LIPID ANSWER: NO
FASTING STATUS PATIENT QL REPORTED: NO
GFR SERPLBLD BASED ON 1.73 SQ M-ARVRAT: 58 ML/MIN/1.73M2 (ref 60–?)
GLOBULIN PLAS-MCNC: 4.1 G/DL (ref 2.8–4.4)
GLUCOSE BLD-MCNC: 118 MG/DL (ref 70–99)
HBA1C MFR BLD: 6.3 % (ref ?–5.7)
HDLC SERPL-MCNC: 62 MG/DL (ref 40–59)
LDLC SERPL CALC-MCNC: 170 MG/DL (ref ?–100)
MICROALBUMIN UR-MCNC: 1.77 MG/DL
MICROALBUMIN/CREAT 24H UR-RTO: 6.2 UG/MG (ref ?–30)
NONHDLC SERPL-MCNC: 191 MG/DL (ref ?–130)
OSMOLALITY SERPL CALC.SUM OF ELEC: 297 MOSM/KG (ref 275–295)
POTASSIUM SERPL-SCNC: 4.1 MMOL/L (ref 3.5–5.1)
PROT SERPL-MCNC: 7.6 G/DL (ref 6.4–8.2)
SODIUM SERPL-SCNC: 141 MMOL/L (ref 136–145)
TRIGL SERPL-MCNC: 120 MG/DL (ref 30–149)
VLDLC SERPL CALC-MCNC: 24 MG/DL (ref 0–30)

## 2023-03-13 PROCEDURE — 82043 UR ALBUMIN QUANTITATIVE: CPT

## 2023-03-13 PROCEDURE — 80053 COMPREHEN METABOLIC PANEL: CPT

## 2023-03-13 PROCEDURE — 82570 ASSAY OF URINE CREATININE: CPT

## 2023-03-13 PROCEDURE — 83036 HEMOGLOBIN GLYCOSYLATED A1C: CPT

## 2023-03-13 PROCEDURE — 80061 LIPID PANEL: CPT

## 2023-03-15 DIAGNOSIS — F33.1 MODERATE EPISODE OF RECURRENT MAJOR DEPRESSIVE DISORDER (HCC): ICD-10-CM

## 2023-03-16 RX ORDER — OMEPRAZOLE 40 MG/1
40 CAPSULE, DELAYED RELEASE ORAL DAILY
Qty: 90 CAPSULE | Refills: 1 | Status: SHIPPED | OUTPATIENT
Start: 2023-03-16

## 2023-03-16 RX ORDER — SERTRALINE HYDROCHLORIDE 100 MG/1
150 TABLET, FILM COATED ORAL DAILY
Qty: 135 TABLET | Refills: 0 | Status: SHIPPED | OUTPATIENT
Start: 2023-03-16

## 2023-05-08 ENCOUNTER — PATIENT MESSAGE (OUTPATIENT)
Dept: FAMILY MEDICINE CLINIC | Facility: CLINIC | Age: 63
End: 2023-05-08

## 2023-05-08 DIAGNOSIS — E78.5 DYSLIPIDEMIA: ICD-10-CM

## 2023-05-08 RX ORDER — EVOLOCUMAB 140 MG/ML
140 INJECTION, SOLUTION SUBCUTANEOUS
Qty: 2 ML | Refills: 2 | OUTPATIENT
Start: 2023-05-08 | End: 2023-06-07

## 2023-05-08 NOTE — TELEPHONE ENCOUNTER
LOV- 3/15/23 NOV  7/25/23  Last refill-12/6/22   Qty-2ml     RF-3     Rx pended if ok (pls check if its the desired dose for repatha)  Thank you

## 2023-05-08 NOTE — TELEPHONE ENCOUNTER
From: [place] An  To: Kelsy Moya DO  Sent: 5/8/2023 2:11 AM CDT  Subject: Praluent Refill    Should I continue with the Praluent? If so, I need a refill. HOWEVER I just received notice that my insurance company no longer covers [id number] [place] [address] but will cover [address]. Will it be ok to switch to the Repatha? If yes, please send 90 day prescription for Repatha to my [facility] mail order pharmacy. Thank you!

## 2023-05-09 RX ORDER — EVOLOCUMAB 140 MG/ML
140 INJECTION, SOLUTION SUBCUTANEOUS
Qty: 6 ML | Refills: 1 | Status: SHIPPED | OUTPATIENT
Start: 2023-05-09 | End: 2023-05-15

## 2023-05-09 NOTE — TELEPHONE ENCOUNTER
Insurance no longer covers Praluent   But will now cover [address] [facility] [address] check if its the appropriate dose

## 2023-05-09 NOTE — TELEPHONE ENCOUNTER
Pt called back. She was on Praluent. However, her insurance will no longer cover it. Pt is requesting to switch to Repatha.

## 2023-05-15 ENCOUNTER — PATIENT MESSAGE (OUTPATIENT)
Dept: FAMILY MEDICINE CLINIC | Facility: CLINIC | Age: 63
End: 2023-05-15

## 2023-05-15 DIAGNOSIS — R93.1 ABNORMAL CT SCAN OF HEART: ICD-10-CM

## 2023-05-15 DIAGNOSIS — I25.10 CORONARY ARTERY DISEASE INVOLVING NATIVE CORONARY ARTERY OF NATIVE HEART WITHOUT ANGINA PECTORIS: Primary | ICD-10-CM

## 2023-05-15 DIAGNOSIS — E78.5 DYSLIPIDEMIA: ICD-10-CM

## 2023-05-15 RX ORDER — EVOLOCUMAB 140 MG/ML
140 INJECTION, SOLUTION SUBCUTANEOUS
Qty: 6 ML | Refills: 1 | Status: SHIPPED | OUTPATIENT
Start: 2023-05-15 | End: 2023-06-14

## 2023-05-15 NOTE — TELEPHONE ENCOUNTER
Dr. Malika Alexis,    [address] [place] [address] 3-15-23  NOV 7-25-23    Pt. Asking for Repatha to be sent to [facility] since she is having issues with [facility]. Order pended. Pt. Is overdue for Repatha. Thank you.

## 2023-05-15 NOTE — TELEPHONE ENCOUNTER
From: Beena Nevarez  To: Shawn Medeiros DO  Sent: 5/15/2023 5:21 PM CDT  Subject: Renetta Moseley. ..I don't know what [facility] is doing but they said they couldn't fill the Repatha. Can you please send prescription to [facility] instead please? I am a couple days overdue taking it so I'd like to get it asap.   Thanks so much

## 2023-05-19 ENCOUNTER — TELEPHONE (OUTPATIENT)
Dept: FAMILY MEDICINE CLINIC | Facility: CLINIC | Age: 63
End: 2023-05-19

## 2023-05-22 ENCOUNTER — TELEPHONE (OUTPATIENT)
Dept: FAMILY MEDICINE CLINIC | Facility: CLINIC | Age: 63
End: 2023-05-22

## 2023-06-28 ENCOUNTER — MED REC SCAN ONLY (OUTPATIENT)
Dept: FAMILY MEDICINE CLINIC | Facility: CLINIC | Age: 63
End: 2023-06-28

## 2023-07-21 DIAGNOSIS — I10 ESSENTIAL HYPERTENSION: ICD-10-CM

## 2023-07-21 DIAGNOSIS — R93.1 ABNORMAL CT SCAN OF HEART: ICD-10-CM

## 2023-07-21 DIAGNOSIS — I25.10 CORONARY ARTERY DISEASE INVOLVING NATIVE CORONARY ARTERY OF NATIVE HEART WITHOUT ANGINA PECTORIS: ICD-10-CM

## 2023-07-21 RX ORDER — ALIROCUMAB 75 MG/ML
1 INJECTION, SOLUTION SUBCUTANEOUS
Qty: 2 ML | Refills: 1 | Status: SHIPPED | OUTPATIENT
Start: 2023-07-21

## 2023-07-24 ENCOUNTER — LAB ENCOUNTER (OUTPATIENT)
Dept: LAB | Age: 63
End: 2023-07-24
Attending: FAMILY MEDICINE
Payer: COMMERCIAL

## 2023-07-24 DIAGNOSIS — E55.9 VITAMIN D DEFICIENCY: ICD-10-CM

## 2023-07-24 DIAGNOSIS — Z00.00 LABORATORY EXAMINATION ORDERED AS PART OF A ROUTINE GENERAL MEDICAL EXAMINATION: ICD-10-CM

## 2023-07-24 LAB
ALBUMIN SERPL-MCNC: 3.7 G/DL (ref 3.4–5)
ALBUMIN/GLOB SERPL: 0.8 {RATIO} (ref 1–2)
ALP LIVER SERPL-CCNC: 103 U/L
ALT SERPL-CCNC: 24 U/L
ANION GAP SERPL CALC-SCNC: 3 MMOL/L (ref 0–18)
AST SERPL-CCNC: 26 U/L (ref 15–37)
BASOPHILS # BLD AUTO: 0.03 X10(3) UL (ref 0–0.2)
BASOPHILS NFR BLD AUTO: 0.5 %
BILIRUB SERPL-MCNC: 0.4 MG/DL (ref 0.1–2)
BUN BLD-MCNC: 24 MG/DL (ref 7–18)
CALCIUM BLD-MCNC: 9.7 MG/DL (ref 8.5–10.1)
CHLORIDE SERPL-SCNC: 108 MMOL/L (ref 98–112)
CHOLEST SERPL-MCNC: 317 MG/DL (ref ?–200)
CO2 SERPL-SCNC: 28 MMOL/L (ref 21–32)
CREAT BLD-MCNC: 1.09 MG/DL
EGFRCR SERPLBLD CKD-EPI 2021: 57 ML/MIN/1.73M2 (ref 60–?)
EOSINOPHIL # BLD AUTO: 0.15 X10(3) UL (ref 0–0.7)
EOSINOPHIL NFR BLD AUTO: 2.4 %
ERYTHROCYTE [DISTWIDTH] IN BLOOD BY AUTOMATED COUNT: 14 %
EST. AVERAGE GLUCOSE BLD GHB EST-MCNC: 128 MG/DL (ref 68–126)
FASTING PATIENT LIPID ANSWER: NO
FASTING STATUS PATIENT QL REPORTED: NO
GLOBULIN PLAS-MCNC: 4.5 G/DL (ref 2.8–4.4)
GLUCOSE BLD-MCNC: 103 MG/DL (ref 70–99)
HBA1C MFR BLD: 6.1 % (ref ?–5.7)
HCT VFR BLD AUTO: 42.4 %
HDLC SERPL-MCNC: 53 MG/DL (ref 40–59)
HGB BLD-MCNC: 13.6 G/DL
IMM GRANULOCYTES # BLD AUTO: 0.01 X10(3) UL (ref 0–1)
IMM GRANULOCYTES NFR BLD: 0.2 %
LDLC SERPL CALC-MCNC: 218 MG/DL (ref ?–100)
LYMPHOCYTES # BLD AUTO: 2.35 X10(3) UL (ref 1–4)
LYMPHOCYTES NFR BLD AUTO: 37.7 %
MCH RBC QN AUTO: 29.8 PG (ref 26–34)
MCHC RBC AUTO-ENTMCNC: 32.1 G/DL (ref 31–37)
MCV RBC AUTO: 92.8 FL
MONOCYTES # BLD AUTO: 0.48 X10(3) UL (ref 0.1–1)
MONOCYTES NFR BLD AUTO: 7.7 %
NEUTROPHILS # BLD AUTO: 3.22 X10 (3) UL (ref 1.5–7.7)
NEUTROPHILS # BLD AUTO: 3.22 X10(3) UL (ref 1.5–7.7)
NEUTROPHILS NFR BLD AUTO: 51.5 %
NONHDLC SERPL-MCNC: 264 MG/DL (ref ?–130)
OSMOLALITY SERPL CALC.SUM OF ELEC: 292 MOSM/KG (ref 275–295)
PLATELET # BLD AUTO: 233 10(3)UL (ref 150–450)
POTASSIUM SERPL-SCNC: 4.4 MMOL/L (ref 3.5–5.1)
PROT SERPL-MCNC: 8.2 G/DL (ref 6.4–8.2)
RBC # BLD AUTO: 4.57 X10(6)UL
SODIUM SERPL-SCNC: 139 MMOL/L (ref 136–145)
TRIGL SERPL-MCNC: 232 MG/DL (ref 30–149)
TSI SER-ACNC: 2 MIU/ML (ref 0.36–3.74)
VLDLC SERPL CALC-MCNC: 52 MG/DL (ref 0–30)
WBC # BLD AUTO: 6.2 X10(3) UL (ref 4–11)

## 2023-07-24 PROCEDURE — 82306 VITAMIN D 25 HYDROXY: CPT

## 2023-07-24 PROCEDURE — 80061 LIPID PANEL: CPT

## 2023-07-24 PROCEDURE — 80053 COMPREHEN METABOLIC PANEL: CPT

## 2023-07-24 PROCEDURE — 85025 COMPLETE CBC W/AUTO DIFF WBC: CPT

## 2023-07-24 PROCEDURE — 83036 HEMOGLOBIN GLYCOSYLATED A1C: CPT

## 2023-07-24 PROCEDURE — 84443 ASSAY THYROID STIM HORMONE: CPT

## 2023-07-25 ENCOUNTER — OFFICE VISIT (OUTPATIENT)
Dept: FAMILY MEDICINE CLINIC | Facility: CLINIC | Age: 63
End: 2023-07-25
Payer: COMMERCIAL

## 2023-07-25 VITALS
OXYGEN SATURATION: 96 % | DIASTOLIC BLOOD PRESSURE: 72 MMHG | WEIGHT: 293 LBS | SYSTOLIC BLOOD PRESSURE: 116 MMHG | RESPIRATION RATE: 18 BRPM | HEIGHT: 66.75 IN | HEART RATE: 68 BPM | BODY MASS INDEX: 45.99 KG/M2

## 2023-07-25 DIAGNOSIS — Z00.00 ROUTINE GENERAL MEDICAL EXAMINATION AT A HEALTH CARE FACILITY: Primary | ICD-10-CM

## 2023-07-25 DIAGNOSIS — E66.01 MORBID OBESITY WITH BMI OF 45.0-49.9, ADULT (HCC): ICD-10-CM

## 2023-07-25 DIAGNOSIS — F33.1 MODERATE EPISODE OF RECURRENT MAJOR DEPRESSIVE DISORDER (HCC): ICD-10-CM

## 2023-07-25 DIAGNOSIS — Z13.820 SCREENING FOR OSTEOPOROSIS: ICD-10-CM

## 2023-07-25 DIAGNOSIS — K21.9 GASTROESOPHAGEAL REFLUX DISEASE, UNSPECIFIED WHETHER ESOPHAGITIS PRESENT: ICD-10-CM

## 2023-07-25 DIAGNOSIS — E55.9 VITAMIN D DEFICIENCY: ICD-10-CM

## 2023-07-25 DIAGNOSIS — I10 ESSENTIAL HYPERTENSION: ICD-10-CM

## 2023-07-25 DIAGNOSIS — E78.5 DYSLIPIDEMIA: ICD-10-CM

## 2023-07-25 DIAGNOSIS — F41.9 ANXIETY: ICD-10-CM

## 2023-07-25 DIAGNOSIS — R73.03 PREDIABETES: ICD-10-CM

## 2023-07-25 DIAGNOSIS — Z79.899 MEDICATION MANAGEMENT: ICD-10-CM

## 2023-07-25 DIAGNOSIS — M25.552 LEFT HIP PAIN: ICD-10-CM

## 2023-07-25 DIAGNOSIS — I10 ESSENTIAL HYPERTENSION: Primary | ICD-10-CM

## 2023-07-25 DIAGNOSIS — I25.10 CORONARY ARTERY DISEASE INVOLVING NATIVE CORONARY ARTERY OF NATIVE HEART WITHOUT ANGINA PECTORIS: ICD-10-CM

## 2023-07-25 DIAGNOSIS — F19.982 DRUG-INDUCED INSOMNIA (HCC): ICD-10-CM

## 2023-07-25 DIAGNOSIS — Z71.85 VACCINE COUNSELING: ICD-10-CM

## 2023-07-25 LAB — VIT D+METAB SERPL-MCNC: 36.5 NG/ML (ref 30–100)

## 2023-07-25 PROCEDURE — 99396 PREV VISIT EST AGE 40-64: CPT | Performed by: FAMILY MEDICINE

## 2023-07-26 RX ORDER — SERTRALINE HYDROCHLORIDE 100 MG/1
150 TABLET, FILM COATED ORAL DAILY
Qty: 135 TABLET | Refills: 1 | Status: SHIPPED | OUTPATIENT
Start: 2023-07-26

## 2023-07-26 RX ORDER — VALSARTAN 160 MG/1
160 TABLET ORAL DAILY
Qty: 90 TABLET | Refills: 1 | Status: SHIPPED | OUTPATIENT
Start: 2023-07-26

## 2023-08-25 RX ORDER — OMEPRAZOLE 40 MG/1
40 CAPSULE, DELAYED RELEASE ORAL DAILY
Qty: 90 CAPSULE | Refills: 1 | Status: SHIPPED | OUTPATIENT
Start: 2023-08-25

## 2023-08-25 NOTE — TELEPHONE ENCOUNTER
Last office visit: 7/25/2023   Protocol: passed  Requested medication(s) are due for refill today: yes  Requested medication(s) are on the active medication list same strength, form, dose/ sig: yes  Requested medication(s) are managed by provider: yes  Patient has already received a courtsey refill: no  LF: 3/16/2023  Next appt: no future appt

## 2023-08-28 ENCOUNTER — HOSPITAL ENCOUNTER (OUTPATIENT)
Dept: MAMMOGRAPHY | Age: 63
Discharge: HOME | End: 2023-08-28
Attending: FAMILY MEDICINE
Payer: COMMERCIAL

## 2023-08-28 ENCOUNTER — HOSPITAL ENCOUNTER (OUTPATIENT)
Dept: GENERAL RADIOLOGY | Age: 63
Discharge: HOME | End: 2023-08-28
Attending: FAMILY MEDICINE
Payer: COMMERCIAL

## 2023-08-28 DIAGNOSIS — M25.552 LEFT HIP PAIN: ICD-10-CM

## 2023-08-28 DIAGNOSIS — Z12.31 ENCOUNTER FOR SCREENING MAMMOGRAM FOR MALIGNANT NEOPLASM OF BREAST: ICD-10-CM

## 2023-08-28 PROCEDURE — 77063 BREAST TOMOSYNTHESIS BI: CPT | Performed by: FAMILY MEDICINE

## 2023-08-28 PROCEDURE — 73502 X-RAY EXAM HIP UNI 2-3 VIEWS: CPT | Performed by: FAMILY MEDICINE

## 2023-08-28 PROCEDURE — 77067 SCR MAMMO BI INCL CAD: CPT | Performed by: FAMILY MEDICINE

## 2023-09-05 ENCOUNTER — PATIENT MESSAGE (OUTPATIENT)
Dept: FAMILY MEDICINE CLINIC | Facility: CLINIC | Age: 63
End: 2023-09-05

## 2023-09-05 NOTE — TELEPHONE ENCOUNTER
From: Chacha Boyle  To: Daniella Carrillo DO  Sent: 9/5/2023 2:13 AM CDT  Subject: PT for Hip    Spoke with nurse about xray results for my hip pain and she said you were recommending physical therapy and orders were put in. [facility] and and there's no info on how to arrange this. Is there a specific place to contact?  Thank you

## 2023-10-12 ENCOUNTER — TELEPHONE (OUTPATIENT)
Dept: PHYSICAL THERAPY | Facility: HOSPITAL | Age: 63
End: 2023-10-12

## 2023-10-16 ENCOUNTER — OFFICE VISIT (OUTPATIENT)
Dept: PHYSICAL THERAPY | Age: 63
End: 2023-10-16
Attending: FAMILY MEDICINE
Payer: COMMERCIAL

## 2023-10-16 DIAGNOSIS — M25.552 LEFT HIP PAIN: Primary | ICD-10-CM

## 2023-10-16 DIAGNOSIS — M16.0 OSTEOARTHRITIS OF BOTH HIPS, UNSPECIFIED OSTEOARTHRITIS TYPE: ICD-10-CM

## 2023-10-16 PROCEDURE — 97110 THERAPEUTIC EXERCISES: CPT

## 2023-10-16 PROCEDURE — 97161 PT EVAL LOW COMPLEX 20 MIN: CPT

## 2023-10-18 ENCOUNTER — OFFICE VISIT (OUTPATIENT)
Dept: PHYSICAL THERAPY | Age: 63
End: 2023-10-18
Attending: FAMILY MEDICINE
Payer: COMMERCIAL

## 2023-10-18 PROCEDURE — 97140 MANUAL THERAPY 1/> REGIONS: CPT

## 2023-10-18 PROCEDURE — 97110 THERAPEUTIC EXERCISES: CPT

## 2023-10-18 NOTE — PROGRESS NOTES
Diagnosis:   Left hip pain (M25.552)  Osteoarthritis of both hips, unspecified osteoarthritis type (M16.0)       Referring Provider: Junior Hernández  Date of Evaluation:    10/16/23    Precautions:  None Next MD visit:   none scheduled  Date of Surgery: n/a   Insurance Primary/Secondary: [place] / N/A     # Auth Visits: no auth required            Subjective: Pt states that she feels about the same. She is going to [facility] in early December and needs to be able to walk. She is also going to be living in [place], [place] for a few months starting in January and would like to hike without pain. Pain: 2-5/10      Objective:   STRs through glute med, piriformis on L         Assessment: Patient displays good understanding and performance of HEP exercises. She responded well to contract relax at 90-90, noting improved passive ER ROM at 90 deg flexion with less pain at end range post-performance. Continues to display trendelenberg gait on L. Discussed use of cane/walking sticks to reduce load on L hip while ambulating. Goals: (to be met in 10 visits)  Pt will improve hip ABD and ER strength to at least 4/5 to increase ease with standing and walking   Pt will be able to squat to  light objects around the house with <2/10 hip pain   Pt will improve functional hip strength to report ability to ascend/descend 1 flight of stairs reciprocally without use of handrail   Pt will demonstrate improved SLS to >10 seconds MARTIN to promote safety and decrease risk of falls on uneven surfaces such as grass and gravel  Pt will be independent and compliant with comprehensive HEP to maintain progress achieved in PT          Plan: assess tolerance to new exercises  Frequency / Duration: Patient will be seen for 2 x/week or a total of 10 visits over a 90 day period.  Treatment will include: Manual Therapy, Neuromuscular Re-education, Therapeutic Activities, Therapeutic Exercise, and Home Exercise Program instruction, ice/heat, dry needling   Date: 10/18/2023  TX#: 2/10 Date:                 TX#: 3/10 Date:                 TX#: 4/10 Date:                 TX#: 5/10 Date: Tx#: 6/10   Manual: 15 min  STM: Glute med, piriformis, lateral HS, TFL  JM: L hip distraction       TherEx: 24 min  L hip PROM  DKTC on SB, 1x15  LTR on SB, 1x15   Contract-relax isometric hip rotation IR and ER at 90 deg flexion with 0 deg rotation, 5x5\" manual resistance         NMR: 5 min  Bridge on SB, 1x15  TA brace on SB, 15x5\"        Modalities         HEP: Access Code: [email or web address]  URL: [email or web address]  Date: 10/16/2023  Prepared by: Harris Fry     Exercises  - Bridge with Hip Abduction and Resistance  - 1 x daily - 7 x weekly - 2 sets - 10 reps - green band  - Clamshell with Resistance  - 1 x daily - 7 x weekly - 2 sets - 15 reps - green band  - Supine Hip Adduction Isometric with Ball  - 1 x daily - 7 x weekly - 3 sets - 10 reps  - Supine Figure 4 Piriformis Stretch  - 1 x daily - 7 x weekly - 3 sets - 30\" hold  - Supine Piriformis Stretch with Foot on Ground  - 1 x daily - 7 x weekly - 3 sets - 30\" hold    Charges: 1 manual, 2 TE       Total Timed Treatment: 44 min  Total Treatment Time: 44 min

## 2023-10-23 ENCOUNTER — OFFICE VISIT (OUTPATIENT)
Dept: PHYSICAL THERAPY | Age: 63
End: 2023-10-23
Attending: FAMILY MEDICINE
Payer: COMMERCIAL

## 2023-10-23 PROCEDURE — 97110 THERAPEUTIC EXERCISES: CPT

## 2023-10-23 PROCEDURE — 97140 MANUAL THERAPY 1/> REGIONS: CPT

## 2023-10-23 NOTE — PROGRESS NOTES
Diagnosis:   Left hip pain (M25.552)  Osteoarthritis of both hips, unspecified osteoarthritis type (M16.0)       Referring Provider: Jay Guthrie  Date of Evaluation:    10/16/23    Precautions:  None Next MD visit:   none scheduled  Date of Surgery: n/a   Insurance Primary/Secondary: [place] / N/[place]     # Auth Visits: no auth required            Subjective: Pt states that she felt better two days after her appointment but the pain returned Sunday. She was walking every day but then the pain returned. Pain: 4-5/10      Objective:   STRs through glute med, piriformis on L persist  Pain with PROM hip flexion, IR/ER at 90 deg        Assessment: Pt continues to benefit from contract-relax for pain-free ROM gains however continued pain at end ranges of passive motion. She was progressed in gentle table exercises to assist with strengthening the hip for improved joint position and reduced pain for ease of ADL completion. Goals: (to be met in 10 visits)  Pt will improve hip ABD and ER strength to at least 4/5 to increase ease with standing and walking   Pt will be able to squat to  light objects around the house with <2/10 hip pain   Pt will improve functional hip strength to report ability to ascend/descend 1 flight of stairs reciprocally without use of handrail   Pt will demonstrate improved SLS to >10 seconds MARTIN to promote safety and decrease risk of falls on uneven surfaces such as grass and gravel  Pt will be independent and compliant with comprehensive HEP to maintain progress achieved in PT          Plan: gentle hip stretching anteriorly, maybe dry needling  Frequency / Duration: Patient will be seen for 2 x/week or a total of 10 visits over a 90 day period.  Treatment will include: Manual Therapy, Neuromuscular Re-education, Therapeutic Activities, Therapeutic Exercise, and Home Exercise Program instruction, ice/heat, dry needling   Date: 10/18/2023  TX#: 2/10 Date:10/23/2023                 TX#: 3/10 Date:                 TX#: 4/10 Date:                 TX#: 5/10 Date: Tx#: 6/10   Manual: 15 min  STM: Glute med, piriformis, lateral HS, TFL  JM: L hip distraction Manual: 12 min  STM: Glute med, piriformis, lateral HS, TFL  JM: L hip distraction      TherEx: 24 min  L hip PROM  DKTC on SB, 1x15  LTR on SB, 1x15   Contract-relax isometric hip rotation IR and ER at 90 deg flexion with 0 deg rotation, 5x5\" manual resistance   TherEx: 25 min  NuStep, 5 min, lvl 3  L hip PROM  Contract-relax isometric hip rotation IR and ER at 90 deg flexion with 0 deg rotation, 5x5\" manual resistance  Hip add isometric with glute isometric, 20x5\"   DKTC on SB, 1x20  LTR on SB, 1x20  Heel slide on board, 1x20 L       NMR: 5 min  Bridge on SB, 1x15  TA brace on SB, 15x5\"  NMR: 5 min  BKFO with yellow TB, 1x20 B   TA march, 2x10       Modalities         HEP: Access Code: [email or web address]  URL: [email or web address]  Date: 10/16/2023  Prepared by: Chuckie Albaey     Exercises  - Bridge with Hip Abduction and Resistance  - 1 x daily - 7 x weekly - 2 sets - 10 reps - green band  - Clamshell with Resistance  - 1 x daily - 7 x weekly - 2 sets - 15 reps - green band  - Supine Hip Adduction Isometric with Ball  - 1 x daily - 7 x weekly - 3 sets - 10 reps  - Supine Figure 4 Piriformis Stretch  - 1 x daily - 7 x weekly - 3 sets - 30\" hold  - Supine Piriformis Stretch with Foot on Ground  - 1 x daily - 7 x weekly - 3 sets - 30\" hold    Charges: 1 manual, 2 TE       Total Timed Treatment: 42 min  Total Treatment Time: 42 min

## 2023-10-25 ENCOUNTER — OFFICE VISIT (OUTPATIENT)
Dept: PHYSICAL THERAPY | Age: 63
End: 2023-10-25
Attending: FAMILY MEDICINE

## 2023-10-25 PROCEDURE — 97140 MANUAL THERAPY 1/> REGIONS: CPT

## 2023-10-25 PROCEDURE — 97110 THERAPEUTIC EXERCISES: CPT

## 2023-10-25 NOTE — PROGRESS NOTES
Diagnosis:   Left hip pain (M25.552)  Osteoarthritis of both hips, unspecified osteoarthritis type (M16.0)       Referring Provider: Desi Porter  Date of Evaluation:    10/16/23    Precautions:  None Next MD visit:   none scheduled  Date of Surgery: n/a   Insurance Primary/Secondary: [place] / N/[place]     # Auth Visits: no auth required            Subjective: Pt states that she continues to have anterior groin pain all the time with occasional lateral hip pain. Pain: 4-5/10      Objective: Increased L hip PROM however continues to be painful         Assessment: Pt displays improvements in PROM this visit however continues to be painful. She was progressed to standing exercises today with fair tolerance. Has difficulty maintaining balance on LLE independently due to continued hip weakness of abductors. Goals: (to be met in 10 visits)  Pt will improve hip ABD and ER strength to at least 4/5 to increase ease with standing and walking   Pt will be able to squat to  light objects around the house with <2/10 hip pain   Pt will improve functional hip strength to report ability to ascend/descend 1 flight of stairs reciprocally without use of handrail   Pt will demonstrate improved SLS to >10 seconds MARTIN to promote safety and decrease risk of falls on uneven surfaces such as grass and gravel  Pt will be independent and compliant with comprehensive HEP to maintain progress achieved in PT          Plan: gentle hip stretching anteriorly, maybe dry needling  Frequency / Duration: Patient will be seen for 2 x/week or a total of 10 visits over a 90 day period. Treatment will include: Manual Therapy, Neuromuscular Re-education, Therapeutic Activities, Therapeutic Exercise, and Home Exercise Program instruction, ice/heat, dry needling   Date: 10/18/2023  TX#: 2/10 Date:10/23/2023                 TX#: 3/10 Date: 10/25/2023  TX#: 4/10 Date:                 TX#: 5/10 Date:    Tx#: 6/10   Manual: 15 min  STM: Glute med, piriformis, lateral HS, TFL  JM: L hip distraction Manual: 12 min  STM: Glute med, piriformis, lateral HS, TFL  JM: L hip distraction Manual: 12 min  STM: Glute med, piriformis, lateral HS, TFL, quad  JM: L hip distraction     TherEx: 24 min  L hip PROM  DKTC on SB, 1x15  LTR on SB, 1x15   Contract-relax isometric hip rotation IR and ER at 90 deg flexion with 0 deg rotation, 5x5\" manual resistance   TherEx: 25 min  NuStep, 5 min, lvl 3  L hip PROM  Contract-relax isometric hip rotation IR and ER at 90 deg flexion with 0 deg rotation, 5x5\" manual resistance  Hip add isometric with glute isometric, 20x5\"   DKTC on SB, 1x20  LTR on SB, 1x20  Heel slide on board, 1x20 L  TherEx: 30 min  NuStep, 5 min, lvl 4  L hip PROM  Hip flexor stretching manually  Hip add isometric with glute bridge mini, 20x5\"   Seated hip ER with black TB, x20  Seated hip IR with red TB, x20  Standing hip abd, 2x10 B  Standing hip ext, 2x10 B  Standing hip flexor stretch, x30\"      NMR: 5 min  Bridge on SB, 1x15  TA brace on SB, 15x5\"  NMR: 5 min  BKFO with yellow TB, 1x20 B   TA march, 2x10       Modalities         HEP: Access Code: [email or web address]  URL: [email or web address]  Date: 10/16/2023  Prepared by: Nabeel Benitez     Exercises  - Bridge with Hip Abduction and Resistance  - 1 x daily - 7 x weekly - 2 sets - 10 reps - green band  - Clamshell with Resistance  - 1 x daily - 7 x weekly - 2 sets - 15 reps - green band  - Supine Hip Adduction Isometric with Ball  - 1 x daily - 7 x weekly - 3 sets - 10 reps  - Supine Figure 4 Piriformis Stretch  - 1 x daily - 7 x weekly - 3 sets - 30\" hold  - Supine Piriformis Stretch with Foot on Ground  - 1 x daily - 7 x weekly - 3 sets - 30\" hold    Charges: 1 manual, 2 TE       Total Timed Treatment: 42 min  Total Treatment Time: 42 min

## 2023-10-30 ENCOUNTER — OFFICE VISIT (OUTPATIENT)
Dept: PHYSICAL THERAPY | Age: 63
End: 2023-10-30
Attending: FAMILY MEDICINE

## 2023-10-30 ENCOUNTER — TELEPHONE (OUTPATIENT)
Dept: PHYSICAL THERAPY | Facility: HOSPITAL | Age: 63
End: 2023-10-30

## 2023-10-30 PROCEDURE — 97110 THERAPEUTIC EXERCISES: CPT

## 2023-10-30 PROCEDURE — 97140 MANUAL THERAPY 1/> REGIONS: CPT

## 2023-10-30 NOTE — PROGRESS NOTES
Diagnosis:   Left hip pain (M25.552)  Osteoarthritis of both hips, unspecified osteoarthritis type (M16.0)       Referring Provider: Naheed Heredia  Date of Evaluation:    10/16/23    Precautions:  None Next MD visit:   none scheduled  Date of Surgery: n/a   Insurance Primary/Secondary: [place] / N/[place]     # Auth Visits: no auth required            Subjective: Pt states that her pain in the hip continues to ebb and flow. 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 She continues to have pain in the anterior groin and her walking is limited    Pain: 4-5/10      Objective:   Localized twitch response elicited in in TFL, glute med . Assessment: Discussed next steps if therapy doesn't resolve her pain including further medical intervention determined by the physician team but might involve injections or possibly a hip replacement in the future. Attempted dry needling to reduce chronic tension through musculature in the lateral hip region that may be contributing to stiffness and pain. Goals: (to be met in 10 visits)  Pt will improve hip ABD and ER strength to at least 4/5 to increase ease with standing and walking   Pt will be able to squat to  light objects around the house with <2/10 hip pain   Pt will improve functional hip strength to report ability to ascend/descend 1 flight of stairs reciprocally without use of handrail   Pt will demonstrate improved SLS to >10 seconds MARTIN to promote safety and decrease risk of falls on uneven surfaces such as grass and gravel  Pt will be independent and compliant with comprehensive HEP to maintain progress achieved in PT          Plan: assess   Frequency / Duration: Patient will be seen for 2 x/week or a total of 10 visits over a 90 day period.  Treatment will include: Manual Therapy, Neuromuscular Re-education, Therapeutic Activities, Therapeutic Exercise, and Home Exercise Program instruction, ice/heat, dry needling   Date: 10/18/2023  TX#: 2/10 Date:10/23/2023                 TX#: 3/10 Date: 10/25/2023  TX#: 4/10 Date: 10/30/23  Tx#: 5/10   Manual: 15 min  STM: Glute med, piriformis, lateral HS, TFL  JM: L hip distraction Manual: 12 min  STM: Glute med, piriformis, lateral HS, TFL  JM: L hip distraction Manual: 12 min  STM: Glute med, piriformis, lateral HS, TFL, quad  JM: L hip distraction Manual: 30 min    STM: Glute med, piriformis, lateral HS, TFL, quad    TPDN: glute med, TFL    TherEx: 24 min  L hip PROM  DKTC on SB, 1x15  LTR on SB, 1x15   Contract-relax isometric hip rotation IR and ER at 90 deg flexion with 0 deg rotation, 5x5\" manual resistance   TherEx: 25 min  NuStep, 5 min, lvl 3  L hip PROM  Contract-relax isometric hip rotation IR and ER at 90 deg flexion with 0 deg rotation, 5x5\" manual resistance  Hip add isometric with glute isometric, 20x5\"   DKTC on SB, 1x20  LTR on SB, 1x20  Heel slide on board, 1x20 L  TherEx: 30 min  NuStep, 5 min, lvl 4  L hip PROM  Hip flexor stretching manually  Hip add isometric with glute bridge mini, 20x5\"   Seated hip ER with black TB, x20  Seated hip IR with red TB, x20  Standing hip abd, 2x10 B  Standing hip ext, 2x10 B  Standing hip flexor stretch, x30\"  TherEx: 15 min  Standing hip abd, red TB at ankles, 2x10 B  Standing hip ext, red TB at ankles, 2x10 B  Side steps, RTB at ankles, 3 laps  Step ups lateral, 6\" x10 B  TG leg press, 87#, 3x10    NMR: 5 min  Bridge on SB, 1x15  TA brace on SB, 15x5\"  NMR: 5 min  BKFO with yellow TB, 1x20 B   TA march, 2x10      Modalities        Patient education provided for:  indications and benefits of dry needling, post-needling care. Patient acknowledges risks and benefits of dry needling and made an informed decision in use of TPDN with certified physical therapist. Denied use of coagulants or history of blood clotting disorders. HEP: Access Code: [email or web address]  URL: [email or web address]  Date: 10/16/2023  Prepared by: Manuel Laboy Exercises  - Bridge with Hip Abduction and Resistance  - 1 x daily - 7 x weekly - 2 sets - 10 reps - green band  - Clamshell with Resistance  - 1 x daily - 7 x weekly - 2 sets - 15 reps - green band  - Supine Hip Adduction Isometric with Ball  - 1 x daily - 7 x weekly - 3 sets - 10 reps  - Supine Figure 4 Piriformis Stretch  - 1 x daily - 7 x weekly - 3 sets - 30\" hold  - Supine Piriformis Stretch with Foot on Ground  - 1 x daily - 7 x weekly - 3 sets - 30\" hold    Charges: 2 manual, 1 TE       Total Timed Treatment: 45 min  Total Treatment Time: 45 min  Needle insertion time not included in manual therapy minutes charge.

## 2023-10-31 ENCOUNTER — OFFICE VISIT (OUTPATIENT)
Dept: OTOLARYNGOLOGY | Facility: CLINIC | Age: 63
End: 2023-10-31
Payer: COMMERCIAL

## 2023-10-31 DIAGNOSIS — K21.00 GASTROESOPHAGEAL REFLUX DISEASE WITH ESOPHAGITIS, UNSPECIFIED WHETHER HEMORRHAGE: ICD-10-CM

## 2023-10-31 DIAGNOSIS — J30.9 CHRONIC ALLERGIC RHINITIS: ICD-10-CM

## 2023-10-31 DIAGNOSIS — R09.A2 GLOBUS SENSATION: ICD-10-CM

## 2023-10-31 DIAGNOSIS — J35.1 LINGUAL TONSIL HYPERTROPHY: Primary | ICD-10-CM

## 2023-10-31 PROCEDURE — 31575 DIAGNOSTIC LARYNGOSCOPY: CPT | Performed by: STUDENT IN AN ORGANIZED HEALTH CARE EDUCATION/TRAINING PROGRAM

## 2023-10-31 PROCEDURE — 99214 OFFICE O/P EST MOD 30 MIN: CPT | Performed by: STUDENT IN AN ORGANIZED HEALTH CARE EDUCATION/TRAINING PROGRAM

## 2023-10-31 RX ORDER — MONTELUKAST SODIUM 10 MG/1
10 TABLET ORAL NIGHTLY
Qty: 30 TABLET | Refills: 3 | Status: SHIPPED | OUTPATIENT
Start: 2023-10-31

## 2023-10-31 RX ORDER — DOXYCYCLINE HYCLATE 100 MG/1
CAPSULE ORAL
COMMUNITY
Start: 2023-08-09 | End: 2023-11-06 | Stop reason: ALTCHOICE

## 2023-10-31 RX ORDER — B-COMPLEX WITH VITAMIN C
1 TABLET ORAL DAILY
COMMUNITY

## 2023-10-31 RX ORDER — AZELASTINE 1 MG/ML
2 SPRAY, METERED NASAL 2 TIMES DAILY
Qty: 30 ML | Refills: 0 | Status: SHIPPED | OUTPATIENT
Start: 2023-10-31

## 2023-10-31 NOTE — PROGRESS NOTES
Chacha Boyle is a 58year old female. Patient presents with:  Neck Pain: Pt c/o chocking sensation, no difficulty swallowing. X 6 months. ASSESSMENT AND PLAN:   1. Lingual tonsil hypertrophy      2. Chronic allergic rhinitis    3. Gastroesophageal reflux disease with esophagitis, unspecified whether hemorrhage      4. Globus sensation  58year-old presents with a feeling of fullness in the back of her throat that can migrate from side to side. It has been going on for quite a while. She has a history of a pleomorphic adenoma for which I had seen her for and she ended up getting a parotidectomy at the [facility]. She has been healing well from this states a little bit of incisional discomfort behind her ear. She has been taking omeprazole 40 mg daily but has not noticed much of a difference. She does note some allergies and she sleeps with Her Bed. She Uses As Needed Allergy Medication and She Uses Nasacort. She Is Going to Be Moving to [place] in December. Her  Had Base of Tongue HPV Positive Squamous Cell Carcinoma Status Post Chemoradiation Therapy. On exam she had no cervical lymphadenopathy. Her left parotid incision is healing well. Facial nerve intact. No oropharyngeal concerning findings. On flexible laryngoscopy she had erythema of her lingual tonsils. Her lingual tonsils were symmetric and smooth appearing. She also had slight erythema of her retinoids. She is recovering well from her parotidectomy. I think that her throat symptoms are separate from this. They seem to be longstanding or present before her surgery. Possibly from lingual tonsil hypertrophy or inflammation in the setting of possible reflux or postnasal drip. She does have an allergy and postnasal drip history. We will treat this more aggressively with a course of Claritin-D, Singulair and Astelin nasal spray. She can also consider taking her 40 mg of omeprazole twice a day.   Discussed that we will take at least 3 to 4 weeks for this regimen to take into effect. Also discussed humidification and steam inhalation. If still having issues at 1 month she should return to see me. MDM  -Prescription therapy  -2 stable chronic issues    The patient indicates understanding of these issues and agrees to the plan. EXAM:   [facility] 05/15/2008     Pertinent exam findings may also be noted above in assessment and plan     System Details   Skin Inspection - Normal.   Constitutional Overall appearance - Normal.   Head/Face Symmetric, TMJ tenderness not present    Eyes EOMI, PERRL   Right ear:  Canal clear, TM intact, no ARMIN   Left ear:  Canal clear, TM intact, no ARMIN   Nose: Septum midline, inferior turbinates not enlarged, nasal valves without collapse    Oral cavity/Oropharynx: No lesions or masses on inspection or palpation, tonsils symmetric    Neck: Soft without LAD, thyroid not enlarged  Voice clear/ no stridor   Other:      Scopes and Procedures:       Flexible Laryngoscopy Procedure Note (14539)    Due to inability for adequate examination of the larynx and need for magnification to perform the examination, endoscopy was performed. Risks and benefits were discussed with patient/family and they have given verbal consent to proceed. Pre-operative Diagnosis: Lingual tonsil hypertrophy  (primary encounter diagnosis)  Chronic allergic rhinitis  Gastroesophageal reflux disease with esophagitis, unspecified whether hemorrhage  Globus sensation    Post-operative Diagnosis: Same    Procedure: Diagnostic flexible fiberoptic laryngoscopy    Anesthesia: Topical anesthetic Spray     Surgeon Agustina Field MD    EBL: 0cc    Procedure Detail & Findings:     After placement of topical anesthetic intranasally the flexible laryngoscope was inserted into the nare and driven through the nasal cavity with no significant abnormal findings noted in the nasal cavities or nasopharynx.  The oropharynx, hypopharynx and larynx were subsequently examined and the following findings were noted:        Base of Tongue: Erythema of the lingual tonsils that was symmetric and smooth appearing. No masses or lesions or ulcerations or evidence of bleeding        Valeculla: Normal        Arytenoids: Normal/Erythemous: Erythmous        Introitus of the esophagus: Normal        Epiglottis: Normal        False vocal cords: Normal        True vocal cords: Normal        Subglottic space: Normal        Mobility of True vocal cords: Normal    Condition: Stable    Complications: Patient tolerated the procedure well with no immediate complication. Kiran Lozoya MD        Current Outpatient Medications   Medication Sig Dispense Refill    doxycycline 100 MG Oral Cap       Multiple Vitamins-Minerals (ONE DAILY CALCIUM/IRON) Oral Tab Take 1 tablet by mouth daily. loratadine-pseudoephedrine ER  MG Oral Tablet 24 Hr Take 1 tablet by mouth daily. 30 tablet 1    azelastine 0.1 % Nasal Solution 2 sprays by Nasal route 2 (two) times daily. 30 mL 0    montelukast 10 MG Oral Tab Take 1 tablet (10 mg total) by mouth nightly. 30 tablet 3    OMEPRAZOLE 40 MG Oral Capsule Delayed Release TAKE 1 CAPSULE DAILY 90 capsule 1    valsartan 160 MG Oral Tab Take 1 tablet (160 mg total) by mouth daily. 90 tablet 1    sertraline 100 MG Oral Tab Take 1.5 tablets (150 mg total) by mouth daily. 135 tablet 1    PRALUENT 75 MG/ML Subcutaneous Solution Auto-injector ADMINISTER 1 ML UNDER THE SKIN EVERY 14 DAYS 2 mL 1    fexofenadine 180 MG Oral Tab Take 1 tablet (180 mg total) by mouth as needed. Cholecalciferol (VITAMIN D) 25 MCG (1000 UT) Oral Tab Take 4,000 Units by mouth daily. ALPRAZolam 0.25 MG Oral Tab Take 1 tablet (0.25 mg total) by mouth daily as needed (panic attacks). 15 tablet 0    Polyethylene Glycol 3350 17 g Oral Powd Pack Take 17 g by mouth daily as needed (constipation).       docusate sodium 100 MG Oral Cap Take 1 capsule (100 mg total) by mouth 2 (two) times daily. omega-3 fatty acids 1000 MG Oral Cap Take 1,000 mg by mouth daily. Multiple Vitamin (MULTI-VITAMIN DAILY OR) Take 1 tablet by mouth daily. aspirin 81 MG Oral Chew Tab Chew 1 tablet (81 mg total) by mouth daily. mometasone (NASONEX) 50 MCG/ACT Nasal Suspension 2 sprays by Nasal route as needed.  17 g 6      Past Medical History:   Diagnosis Date    Abdominal pain past 2-3 weeks    lower left    Anxiety occassionally    Bloating     Constipation past 2-3 weeks    Dyspareunia     Fibroids     Frequent urination 2 years    OAB  Dr. Sherice Garcia    Heartburn 2015    carlton/caffeine triggered    Hemorrhoids     High cholesterol 5 years    History of depression     Irregular bowel habits     Leaking of urine     Mixed hyperlipidemia     Neoplasm     vaginal wall    Noncompliance     Obesity     Pain in joints 2018    left knee    PMDD (premenstrual dysphoric disorder)     PONV (postoperative nausea and vomiting)     Visual impairment     glasses prn    Vitamin D deficiency     Wears glasses     cataract surgery right eye 2019      Social History:  Social History     Socioeconomic History    Marital status:    Tobacco Use    Smoking status: Former     Packs/day: 1.00     Years: 12.00     Additional pack years: 0.00     Total pack years: 12.00     Types: Cigarettes     Start date: 1973     Quit date: 1985     Years since quittin.8    Smokeless tobacco: Never   Vaping Use    Vaping Use: Never used   Substance and Sexual Activity    Alcohol use: Yes     Comment: 1/yr if that, never a problem    Drug use: No    Sexual activity: Yes     Partners: Male   Other Topics Concern    Exercise Yes          Kaushal Glez MD  10/31/2023  2:35 PM

## 2023-11-02 ENCOUNTER — OFFICE VISIT (OUTPATIENT)
Dept: PHYSICAL THERAPY | Age: 63
End: 2023-11-02
Attending: FAMILY MEDICINE
Payer: COMMERCIAL

## 2023-11-02 PROCEDURE — 97110 THERAPEUTIC EXERCISES: CPT

## 2023-11-02 PROCEDURE — 97140 MANUAL THERAPY 1/> REGIONS: CPT

## 2023-11-02 NOTE — PROGRESS NOTES
Diagnosis:   Left hip pain (M25.552)  Osteoarthritis of both hips, unspecified osteoarthritis type (M16.0)       Referring Provider: Lars Munoz  Date of Evaluation:    10/16/23    Precautions:  None Next MD visit:   none scheduled  Date of Surgery: n/a   Insurance Primary/Secondary: [place] / N/A     # Auth Visits: no auth required            Subjective: Pt states her hip pain has improved massively since dry needling. Pain: 2/10      Objective:   Patient cued to reduce lateral lean during side stepping as compensation. Assessment: Patient was able to tolerate today's tx well without additional pain or discomfort. Was progressed with higher resistances for leg press as well as for hip strengthening movements such as side stepping to further improve her hip strength and stability. Dry needling continued by supervising PT due to effectiveness in reducing TFL and glute med tension. Goals: (to be met in 10 visits)  Pt will improve hip ABD and ER strength to at least 4/5 to increase ease with standing and walking   Pt will be able to squat to  light objects around the house with <2/10 hip pain   Pt will improve functional hip strength to report ability to ascend/descend 1 flight of stairs reciprocally without use of handrail   Pt will demonstrate improved SLS to >10 seconds MARTIN to promote safety and decrease risk of falls on uneven surfaces such as grass and gravel  Pt will be independent and compliant with comprehensive HEP to maintain progress achieved in PT          Plan: Progress hip and LE strengthening as tolerated. Frequency / Duration: Patient will be seen for 2 x/week or a total of 10 visits over a 90 day period.  Treatment will include: Manual Therapy, Neuromuscular Re-education, Therapeutic Activities, Therapeutic Exercise, and Home Exercise Program instruction, ice/heat, dry needling   Date:10/23/2023                 TX#: 3/10 Date: 10/25/2023  TX#: 4/10 Date: 10/30/23  Tx#: 5/10 Date: 11/2/23  Tx# 6/10   Manual: 12 min  STM: Glute med, piriformis, lateral HS, TFL  JM: L hip distraction Manual: 12 min  STM: Glute med, piriformis, lateral HS, TFL, quad  JM: L hip distraction Manual: 30 min    STM: Glute med, piriformis, lateral HS, TFL, quad    TPDN: glute med, TFL  Manual: 30 min    STM: Glute med, piriformis, lateral HS, TFL, quad    TPDN: glute med, TFL (performed by supervising PT NB)   TherEx: 25 min  NuStep, 5 min, lvl 3  L hip PROM  Contract-relax isometric hip rotation IR and ER at 90 deg flexion with 0 deg rotation, 5x5\" manual resistance  Hip add isometric with glute isometric, 20x5\"   DKTC on SB, 1x20  LTR on SB, 1x20  Heel slide on board, 1x20 L  TherEx: 30 min  NuStep, 5 min, lvl 4  L hip PROM  Hip flexor stretching manually  Hip add isometric with glute bridge mini, 20x5\"   Seated hip ER with black TB, x20  Seated hip IR with red TB, x20  Standing hip abd, 2x10 B  Standing hip ext, 2x10 B  Standing hip flexor stretch, x30\"  TherEx: 15 min  Standing hip abd, red TB at ankles, 2x10 B  Standing hip ext, red TB at ankles, 2x10 B  Side steps, RTB at ankles, 3 laps  Step ups lateral, 6\" x10 B  TG leg press, 87#, 3x10  TherEx: 15 min  DLP/SLP 112lb/87lb  2 x 15  Side stepping GTB at ankles 3 x 30'  Standing hip abd/ext GTB 2 x 10       NMR: 5 min  BKFO with yellow TB, 1x20 B   TA march, 2x10             Patient education provided for:  indications and benefits of dry needling, post-needling care. Patient acknowledges risks and benefits of dry needling and made an informed decision in use of TPDN with certified physical therapist. Denied use of coagulants or history of blood clotting disorders. HEP: Access Code: [email or web address]  URL: [email or web address]  Date: 10/16/2023  Prepared by: Gilberto Romero     Exercises  - Bridge with Hip Abduction and Resistance  - 1 x daily - 7 x weekly - 2 sets - 10 reps - green band  - Clamshell with Resistance  - 1 x daily - 7 x weekly - 2 sets - 15 reps - green band  - Supine Hip Adduction Isometric with Ball  - 1 x daily - 7 x weekly - 3 sets - 10 reps  - Supine Figure 4 Piriformis Stretch  - 1 x daily - 7 x weekly - 3 sets - 30\" hold  - Supine Piriformis Stretch with Foot on Ground  - 1 x daily - 7 x weekly - 3 sets - 30\" hold    Charges: 2 manual, 1 TE       Total Timed Treatment: 45 min  Total Treatment Time: 45 min  Needle insertion time not included in manual therapy minutes charge.

## 2023-11-06 ENCOUNTER — TELEPHONE (OUTPATIENT)
Dept: PHYSICAL THERAPY | Age: 63
End: 2023-11-06

## 2023-11-08 ENCOUNTER — APPOINTMENT (OUTPATIENT)
Dept: PHYSICAL THERAPY | Age: 63
End: 2023-11-08
Attending: FAMILY MEDICINE
Payer: COMMERCIAL

## 2023-11-08 ENCOUNTER — TELEPHONE (OUTPATIENT)
Dept: PHYSICAL THERAPY | Facility: HOSPITAL | Age: 63
End: 2023-11-08

## 2023-11-14 ENCOUNTER — OFFICE VISIT (OUTPATIENT)
Dept: PHYSICAL THERAPY | Age: 63
End: 2023-11-14
Attending: FAMILY MEDICINE
Payer: COMMERCIAL

## 2023-11-14 PROCEDURE — 97140 MANUAL THERAPY 1/> REGIONS: CPT

## 2023-11-14 PROCEDURE — 97110 THERAPEUTIC EXERCISES: CPT

## 2023-11-14 NOTE — PROGRESS NOTES
Diagnosis:   Left hip pain (M25.552)  Osteoarthritis of both hips, unspecified osteoarthritis type (M16.0)       Referring Provider: Bharathi Waters  Date of Evaluation:    10/16/23    Precautions:  None Next MD visit:   none scheduled  Date of Surgery: n/a   Insurance Primary/Secondary: [place] / N/A     # Auth Visits: no auth required            Subjective: Pt states her mobility is getting better, but the pain is about the same. She is feeling \"use\" in her gluteal muscles. States the pain continues to be focused in the front of the hip. Did her exercises and stretching last week when she wasn't here. Pain: 2/10      Objective:   Continued limited hip extension with L Trendelenberg gait          Assessment: Emphasized mobilization and stretching of the anterior hip to improve hip extension in gait and reduce pain in the anterior hip in weight bearing. Attempted dry needling to the TFL and hip flexors to reduce tension in the anterior hip contributing to pain. Goals: (to be met in 10 visits)  Pt will improve hip ABD and ER strength to at least 4/5 to increase ease with standing and walking   Pt will be able to squat to  light objects around the house with <2/10 hip pain   Pt will improve functional hip strength to report ability to ascend/descend 1 flight of stairs reciprocally without use of handrail   Pt will demonstrate improved SLS to >10 seconds MARTIN to promote safety and decrease risk of falls on uneven surfaces such as grass and gravel  Pt will be independent and compliant with comprehensive HEP to maintain progress achieved in PT          Plan: Progress hip and LE strengthening as tolerated. Frequency / Duration: Patient will be seen for 2 x/week or a total of 10 visits over a 90 day period.  Treatment will include: Manual Therapy, Neuromuscular Re-education, Therapeutic Activities, Therapeutic Exercise, and Home Exercise Program instruction, ice/heat, dry needling   Date:10/23/2023 TX#: 3/10 Date: 10/25/2023  TX#: 4/10 Date: 10/30/23  Tx#: 5/10 Date: 11/2/23  Tx# 6/10 Date: 11/14/2023  Tx#: 7/10   Manual: 12 min  STM: Glute med, piriformis, lateral HS, TFL  JM: L hip distraction Manual: 12 min  STM: Glute med, piriformis, lateral HS, TFL, quad  JM: L hip distraction Manual: 30 min    STM: Glute med, piriformis, lateral HS, TFL, quad    TPDN: glute med, TFL  Manual: 30 min    STM: Glute med, piriformis, lateral HS, TFL, quad    TPDN: glute med, TFL (performed by supervising PT NB) Manual: 25 min    TPDN: hip flexors, TFL   TherEx: 25 min  NuStep, 5 min, lvl 3  L hip PROM  Contract-relax isometric hip rotation IR and ER at 90 deg flexion with 0 deg rotation, 5x5\" manual resistance  Hip add isometric with glute isometric, 20x5\"   DKTC on SB, 1x20  LTR on SB, 1x20  Heel slide on board, 1x20 L  TherEx: 30 min  NuStep, 5 min, lvl 4  L hip PROM  Hip flexor stretching manually  Hip add isometric with glute bridge mini, 20x5\"   Seated hip ER with black TB, x20  Seated hip IR with red TB, x20  Standing hip abd, 2x10 B  Standing hip ext, 2x10 B  Standing hip flexor stretch, x30\"  TherEx: 15 min  Standing hip abd, red TB at ankles, 2x10 B  Standing hip ext, red TB at ankles, 2x10 B  Side steps, RTB at ankles, 3 laps  Step ups lateral, 6\" x10 B  TG leg press, 87#, 3x10  TherEx: 15 min  DLP/SLP 112lb/87lb  2 x 15  Side stepping GTB at ankles 3 x 30'  Standing hip abd/ext GTB 2 x 10     Ther-Ex: 20 min  Hip flexor stretch EOT, 2x30\"   LAQ in modified laura, 2x10  Short range SLR with relaxation, 2x10  Bridge with abd, blue TB, 2x10   Clams, blue TB, 2x10 B  HEP update   NMR: 5 min  BKFO with yellow TB, 1x20 B   TA march, 2x10               Patient education provided for:  indications and benefits of dry needling, post-needling care.    Patient acknowledges risks and benefits of dry needling and made an informed decision in use of TPDN with certified physical therapist. Denied use of coagulants or history of blood clotting disorders. HEP: Access Code: [email or web address]  URL: [email or web address]  Date: 10/16/2023  Prepared by: Sebastián Jules     Exercises  - Bridge with Hip Abduction and Resistance  - 1 x daily - 7 x weekly - 2 sets - 10 reps - green band  - Clamshell with Resistance  - 1 x daily - 7 x weekly - 2 sets - 15 reps - green band  - Supine Hip Adduction Isometric with Ball  - 1 x daily - 7 x weekly - 3 sets - 10 reps  - Supine Figure 4 Piriformis Stretch  - 1 x daily - 7 x weekly - 3 sets - 30\" hold  - Supine Piriformis Stretch with Foot on Ground  - 1 x daily - 7 x weekly - 3 sets - 30\" hold    Charges: 2 manual, 1 TE       Total Timed Treatment: 45 min  Total Treatment Time: 45 min  Needle insertion time not included in manual therapy minutes charge.

## 2023-11-16 ENCOUNTER — OFFICE VISIT (OUTPATIENT)
Dept: PHYSICAL THERAPY | Age: 63
End: 2023-11-16
Attending: FAMILY MEDICINE
Payer: COMMERCIAL

## 2023-11-16 PROCEDURE — 97110 THERAPEUTIC EXERCISES: CPT

## 2023-11-16 PROCEDURE — 97140 MANUAL THERAPY 1/> REGIONS: CPT

## 2023-11-16 NOTE — PROGRESS NOTES
Diagnosis:   Left hip pain (M25.552)  Osteoarthritis of both hips, unspecified osteoarthritis type (M16.0)       Referring Provider: Cindi Peace  Date of Evaluation:    10/16/23    Precautions:  None Next MD visit:   none scheduled  Date of Surgery: n/a   Insurance Primary/Secondary: [place] / N/A     # Auth Visits: no auth required            Subjective: Pt states that she has been in more pain since last night. Unsure why. She has been attempting climbing stairs with     Pain: 2/10      Objective:   Severe hip abduction weakness. Assessment: Focused on strengthening per pt request to assist with stair climbing. She displays inability to lift the leg into abduction against gravity in supine and needs therapist assist to complete. Continued weakness through glute med contributing to trendelenberg gait and difficulty with stair ascent. Will benefit from further hip abd/ext strength. Goals: (to be met in 10 visits)  Pt will improve hip ABD and ER strength to at least 4/5 to increase ease with standing and walking   Pt will be able to squat to  light objects around the house with <2/10 hip pain   Pt will improve functional hip strength to report ability to ascend/descend 1 flight of stairs reciprocally without use of handrail   Pt will demonstrate improved SLS to >10 seconds MARTIN to promote safety and decrease risk of falls on uneven surfaces such as grass and gravel  Pt will be independent and compliant with comprehensive HEP to maintain progress achieved in PT          Plan: Progress hip and LE strengthening as tolerated. Frequency / Duration: Patient will be seen for 2 x/week or a total of 10 visits over a 90 day period.  Treatment will include: Manual Therapy, Neuromuscular Re-education, Therapeutic Activities, Therapeutic Exercise, and Home Exercise Program instruction, ice/heat, dry needling   Date: 10/30/23  Tx#: 5/10 Date: 11/2/23  Tx# 6/10 Date: 11/14/2023  Tx#: 7/10 Date: 11/16/2023  Tx#: 8/10     Manual: 30 min    STM: Glute med, piriformis, lateral HS, TFL, quad    TPDN: glute med, TFL  Manual: 30 min    STM: Glute med, piriformis, lateral HS, TFL, quad    TPDN: glute med, TFL (performed by supervising PT NB) Manual: 25 min    TPDN: hip flexors, TFL Manual: 18 min    STM: Glute med, piriformis, lateral HS, TFL, quad   TherEx: 15 min  Standing hip abd, red TB at ankles, 2x10 B  Standing hip ext, red TB at ankles, 2x10 B  Side steps, RTB at ankles, 3 laps  Step ups lateral, 6\" x10 B  TG leg press, 87#, 3x10  TherEx: 15 min  DLP/SLP 112lb/87lb  2 x 15  Side stepping GTB at ankles 3 x 30'  Standing hip abd/ext GTB 2 x 10     Ther-Ex: 20 min  Hip flexor stretch EOT, 2x30\"   LAQ in modified laura, 2x10  Short range SLR with relaxation, 2x10  Bridge with abd, blue TB, 2x10   Clams, blue TB, 2x10 B  HEP update Ther-Ex: 35 min  Sidelying hip abduction with therapist assist, 1x10  Supine march, 2x10 B  Supine leg ext, 2x10 B  Supine BKFO, 2x10 B  Clam, 2x10   Reverse clam, 2x10   DLP/SLP, 125#/75#, 3x10                Patient education provided for:  indications and benefits of dry needling, post-needling care. Patient acknowledges risks and benefits of dry needling and made an informed decision in use of TPDN with certified physical therapist. Denied use of coagulants or history of blood clotting disorders. HEP: Access Code: [email or web address]  URL: [email or web address]  Date: 10/16/2023  Prepared by: Garrick Cabot     Exercises  - Bridge with Hip Abduction and Resistance  - 1 x daily - 7 x weekly - 2 sets - 10 reps - green band  - Clamshell with Resistance  - 1 x daily - 7 x weekly - 2 sets - 15 reps - green band  - Supine Hip Adduction Isometric with Ball  - 1 x daily - 7 x weekly - 3 sets - 10 reps  - Supine Figure 4 Piriformis Stretch  - 1 x daily - 7 x weekly - 3 sets - 30\" hold  - Supine Piriformis Stretch with Foot on Ground  - 1 x daily - 7 x weekly - 3 sets - 30\" hold    Charges: 1 manual, 2 TE       Total Timed Treatment: 48 min  Total Treatment Time: 48 min  Needle insertion time not included in manual therapy minutes charge.

## 2023-11-17 ENCOUNTER — TELEPHONE (OUTPATIENT)
Dept: ORTHOPEDICS CLINIC | Facility: CLINIC | Age: 63
End: 2023-11-17

## 2023-11-17 DIAGNOSIS — M25.552 LEFT HIP PAIN: Primary | ICD-10-CM

## 2023-11-17 NOTE — TELEPHONE ENCOUNTER
Imaging was completed for this patient for a LT Hip pain, but it needs to be reviewed to see if additional imaging is needed. If so, please enter the appropriate RX and let the patient know to come in before their appointment to complete the additional imaging. Thank you!     Future Appointments   Date Time Provider Judson Iverson   11/21/2023  3:00 PM Dee Mullins PTA [facility]   11/30/2023 11:45 AM Chandni Garland PT [facility]   1/3/2024 11:20 AM Antonio Thompson MD [facility]

## 2023-11-21 ENCOUNTER — OFFICE VISIT (OUTPATIENT)
Dept: PHYSICAL THERAPY | Age: 63
End: 2023-11-21
Attending: FAMILY MEDICINE
Payer: COMMERCIAL

## 2023-11-21 PROCEDURE — 97110 THERAPEUTIC EXERCISES: CPT

## 2023-11-21 NOTE — PROGRESS NOTES
Diagnosis:   Left hip pain (M25.552)  Osteoarthritis of both hips, unspecified osteoarthritis type (M16.0)       Referring Provider: Judah Neville  Date of Evaluation:    10/16/23    Precautions:  None Next MD visit:   none scheduled  Date of Surgery: n/a   Insurance Primary/Secondary: [employer] / N/A     # Auth Visits: no auth required            Subjective: Pt states that she was feeling better until she moved furniture this past weekend. Pain: 2/10      Objective:   Severe hip abduction weakness. Assessment: Patient continues to demonstrate improving strength and endurance of the LE. Was progressed w/ step ups for functional mobility. No pain or discomfort during all exercises for today's tx. Goals: (to be met in 10 visits)  Pt will improve hip ABD and ER strength to at least 4/5 to increase ease with standing and walking   Pt will be able to squat to  light objects around the house with <2/10 hip pain   Pt will improve functional hip strength to report ability to ascend/descend 1 flight of stairs reciprocally without use of handrail   Pt will demonstrate improved SLS to >10 seconds MARTIN to promote safety and decrease risk of falls on uneven surfaces such as grass and gravel  Pt will be independent and compliant with comprehensive HEP to maintain progress achieved in PT          Plan: PN note next visit  Frequency / Duration: Patient will be seen for 2 x/week or a total of 10 visits over a 90 day period.  Treatment will include: Manual Therapy, Neuromuscular Re-education, Therapeutic Activities, Therapeutic Exercise, and Home Exercise Program instruction, ice/heat, dry needling   Date: 10/30/23  Tx#: 5/10 Date: 11/2/23  Tx# 6/10 Date: 11/14/2023  Tx#: 7/10 Date: 11/16/2023  Tx#: 8/10   Date: 11/21/23  Tx# 9/10   Manual: 30 min    STM: Glute med, piriformis, lateral HS, TFL, quad    TPDN: glute med, TFL  Manual: 30 min    STM: Glute med, piriformis, lateral HS, TFL, quad    TPDN: glute med, TFL (performed by supervising PT NB) Manual: 25 min    TPDN: hip flexors, TFL Manual: 18 min    STM: Glute med, piriformis, lateral HS, TFL, quad    TherEx: 15 min  Standing hip abd, red TB at ankles, 2x10 B  Standing hip ext, red TB at ankles, 2x10 B  Side steps, RTB at ankles, 3 laps  Step ups lateral, 6\" x10 B  TG leg press, 87#, 3x10  TherEx: 15 min  DLP/SLP 112lb/87lb  2 x 15  Side stepping GTB at ankles 3 x 30'  Standing hip abd/ext GTB 2 x 10     Ther-Ex: 20 min  Hip flexor stretch EOT, 2x30\"   LAQ in modified laura, 2x10  Short range SLR with relaxation, 2x10  Bridge with abd, blue TB, 2x10   Clams, blue TB, 2x10 B  HEP update Ther-Ex: 35 min  Sidelying hip abduction with therapist assist, 1x10  Supine march, 2x10 B  Supine leg ext, 2x10 B  Supine BKFO, 2x10 B  Clam, 2x10   Reverse clam, 2x10   DLP/SLP, 125#/75#, 3x10  TherEx: 45min  NuStep 5min lvl 1   Sidelying hip abduction 2 x 10  Sidelying hip abduction to extension combination 2 x 10  4\" step up fwd x 15  6\" step up fwd x 15  6\" step up lateral x 15  TRX squat 2 x 15  Marches on Airex x 20                 Patient education provided for:  indications and benefits of dry needling, post-needling care. Patient acknowledges risks and benefits of dry needling and made an informed decision in use of TPDN with certified physical therapist. Denied use of coagulants or history of blood clotting disorders. HEP: Access Code: [email or web address]  URL: [email or web address]  Date: 10/16/2023  Prepared by: Georgia King     Exercises  - Bridge with Hip Abduction and Resistance  - 1 x daily - 7 x weekly - 2 sets - 10 reps - green band  - Clamshell with Resistance  - 1 x daily - 7 x weekly - 2 sets - 15 reps - green band  - Supine Hip Adduction Isometric with Ball  - 1 x daily - 7 x weekly - 3 sets - 10 reps  - Supine Figure 4 Piriformis Stretch  - 1 x daily - 7 x weekly - 3 sets - 30\" hold  - Supine Piriformis Stretch with Foot on Ground  - 1 x daily - 7 x weekly - 3 sets - 30\" hold    Charges: 3 TE       Total Timed Treatment: 45 min  Total Treatment Time: 45 min  Needle insertion time not included in manual therapy minutes charge.

## 2023-11-28 ENCOUNTER — LAB ENCOUNTER (OUTPATIENT)
Dept: LAB | Age: 63
End: 2023-11-28
Attending: FAMILY MEDICINE
Payer: COMMERCIAL

## 2023-11-28 DIAGNOSIS — I10 ESSENTIAL HYPERTENSION: ICD-10-CM

## 2023-11-28 DIAGNOSIS — E78.5 DYSLIPIDEMIA: ICD-10-CM

## 2023-11-28 DIAGNOSIS — Z13.89 SCREENING FOR GENITOURINARY CONDITION: ICD-10-CM

## 2023-11-28 DIAGNOSIS — R73.03 PREDIABETES: ICD-10-CM

## 2023-11-28 LAB
ALBUMIN SERPL-MCNC: 3.5 G/DL (ref 3.4–5)
ALBUMIN/GLOB SERPL: 0.8 {RATIO} (ref 1–2)
ALP LIVER SERPL-CCNC: 85 U/L
ALT SERPL-CCNC: 31 U/L
ANION GAP SERPL CALC-SCNC: 5 MMOL/L (ref 0–18)
AST SERPL-CCNC: 25 U/L (ref 15–37)
BILIRUB SERPL-MCNC: 0.4 MG/DL (ref 0.1–2)
BILIRUB UR QL STRIP.AUTO: NEGATIVE
BUN BLD-MCNC: 26 MG/DL (ref 9–23)
CALCIUM BLD-MCNC: 9.5 MG/DL (ref 8.5–10.1)
CHLORIDE SERPL-SCNC: 107 MMOL/L (ref 98–112)
CHOLEST SERPL-MCNC: 407 MG/DL (ref ?–200)
CO2 SERPL-SCNC: 27 MMOL/L (ref 21–32)
COLOR UR AUTO: YELLOW
CREAT BLD-MCNC: 1.22 MG/DL
EGFRCR SERPLBLD CKD-EPI 2021: 50 ML/MIN/1.73M2 (ref 60–?)
EST. AVERAGE GLUCOSE BLD GHB EST-MCNC: 126 MG/DL (ref 68–126)
FASTING PATIENT LIPID ANSWER: NO
FASTING STATUS PATIENT QL REPORTED: NO
GLOBULIN PLAS-MCNC: 4.4 G/DL (ref 2.8–4.4)
GLUCOSE BLD-MCNC: 105 MG/DL (ref 70–99)
GLUCOSE UR STRIP.AUTO-MCNC: NORMAL MG/DL
HBA1C MFR BLD: 6 % (ref ?–5.7)
HDLC SERPL-MCNC: 56 MG/DL (ref 40–59)
HYALINE CASTS #/AREA URNS AUTO: PRESENT /LPF
KETONES UR STRIP.AUTO-MCNC: NEGATIVE MG/DL
LDLC SERPL CALC-MCNC: 306 MG/DL (ref ?–100)
LEUKOCYTE ESTERASE UR QL STRIP.AUTO: 500
NITRITE UR QL STRIP.AUTO: NEGATIVE
NONHDLC SERPL-MCNC: 351 MG/DL (ref ?–130)
OSMOLALITY SERPL CALC.SUM OF ELEC: 293 MOSM/KG (ref 275–295)
PH UR STRIP.AUTO: 5 [PH] (ref 5–8)
POTASSIUM SERPL-SCNC: 4.3 MMOL/L (ref 3.5–5.1)
PROT SERPL-MCNC: 7.9 G/DL (ref 6.4–8.2)
PROT UR STRIP.AUTO-MCNC: NEGATIVE MG/DL
RBC UR QL AUTO: NEGATIVE
SODIUM SERPL-SCNC: 139 MMOL/L (ref 136–145)
SP GR UR STRIP.AUTO: 1.02 (ref 1–1.03)
TRIGL SERPL-MCNC: 210 MG/DL (ref 30–149)
UROBILINOGEN UR STRIP.AUTO-MCNC: NORMAL MG/DL
VLDLC SERPL CALC-MCNC: 56 MG/DL (ref 0–30)

## 2023-11-28 PROCEDURE — 81001 URINALYSIS AUTO W/SCOPE: CPT

## 2023-11-28 PROCEDURE — 80061 LIPID PANEL: CPT

## 2023-11-28 PROCEDURE — 83036 HEMOGLOBIN GLYCOSYLATED A1C: CPT

## 2023-11-28 PROCEDURE — 80053 COMPREHEN METABOLIC PANEL: CPT

## 2023-11-29 ENCOUNTER — OFFICE VISIT (OUTPATIENT)
Dept: FAMILY MEDICINE CLINIC | Facility: CLINIC | Age: 63
End: 2023-11-29
Payer: COMMERCIAL

## 2023-11-29 VITALS
BODY MASS INDEX: 45.99 KG/M2 | RESPIRATION RATE: 16 BRPM | DIASTOLIC BLOOD PRESSURE: 70 MMHG | OXYGEN SATURATION: 98 % | HEART RATE: 68 BPM | SYSTOLIC BLOOD PRESSURE: 136 MMHG | HEIGHT: 66.75 IN | WEIGHT: 293 LBS

## 2023-11-29 DIAGNOSIS — R82.90 ABNORMAL URINALYSIS: ICD-10-CM

## 2023-11-29 DIAGNOSIS — M25.552 LEFT HIP PAIN: ICD-10-CM

## 2023-11-29 DIAGNOSIS — Z13.89 SCREENING FOR GENITOURINARY CONDITION: ICD-10-CM

## 2023-11-29 DIAGNOSIS — I10 ESSENTIAL HYPERTENSION: Primary | ICD-10-CM

## 2023-11-29 DIAGNOSIS — K21.9 GASTROESOPHAGEAL REFLUX DISEASE, UNSPECIFIED WHETHER ESOPHAGITIS PRESENT: ICD-10-CM

## 2023-11-29 DIAGNOSIS — E78.5 DYSLIPIDEMIA: ICD-10-CM

## 2023-11-29 DIAGNOSIS — E55.9 VITAMIN D DEFICIENCY: ICD-10-CM

## 2023-11-29 DIAGNOSIS — Z71.85 VACCINE COUNSELING: ICD-10-CM

## 2023-11-29 DIAGNOSIS — E66.01 MORBID OBESITY WITH BMI OF 45.0-49.9, ADULT (HCC): ICD-10-CM

## 2023-11-29 DIAGNOSIS — Z79.899 MEDICATION MANAGEMENT: ICD-10-CM

## 2023-11-29 DIAGNOSIS — M16.12 PRIMARY OSTEOARTHRITIS OF LEFT HIP: ICD-10-CM

## 2023-11-29 DIAGNOSIS — Z00.00 LABORATORY EXAMINATION ORDERED AS PART OF A ROUTINE GENERAL MEDICAL EXAMINATION: ICD-10-CM

## 2023-11-29 DIAGNOSIS — F33.1 MODERATE EPISODE OF RECURRENT MAJOR DEPRESSIVE DISORDER (HCC): ICD-10-CM

## 2023-11-29 DIAGNOSIS — R06.81 APNEA: ICD-10-CM

## 2023-11-29 DIAGNOSIS — R73.03 PREDIABETES: ICD-10-CM

## 2023-11-29 DIAGNOSIS — F41.9 ANXIETY: ICD-10-CM

## 2023-11-29 DIAGNOSIS — L72.3 SEBACEOUS CYST: ICD-10-CM

## 2023-11-29 DIAGNOSIS — R43.1 HYPEROSMIA: ICD-10-CM

## 2023-11-29 DIAGNOSIS — I10 ESSENTIAL HYPERTENSION: ICD-10-CM

## 2023-11-29 DIAGNOSIS — I25.10 CORONARY ARTERY DISEASE INVOLVING NATIVE CORONARY ARTERY OF NATIVE HEART WITHOUT ANGINA PECTORIS: ICD-10-CM

## 2023-11-29 PROCEDURE — 3075F SYST BP GE 130 - 139MM HG: CPT | Performed by: FAMILY MEDICINE

## 2023-11-29 PROCEDURE — 3008F BODY MASS INDEX DOCD: CPT | Performed by: FAMILY MEDICINE

## 2023-11-29 PROCEDURE — 3078F DIAST BP <80 MM HG: CPT | Performed by: FAMILY MEDICINE

## 2023-11-29 PROCEDURE — 99215 OFFICE O/P EST HI 40 MIN: CPT | Performed by: FAMILY MEDICINE

## 2023-11-29 RX ORDER — EVOLOCUMAB 140 MG/ML
140 INJECTION, SOLUTION SUBCUTANEOUS
Qty: 6 EACH | Refills: 1 | Status: SHIPPED | OUTPATIENT
Start: 2023-11-29 | End: 2023-12-29

## 2023-11-30 ENCOUNTER — LAB ENCOUNTER (OUTPATIENT)
Dept: LAB | Age: 63
End: 2023-11-30
Attending: FAMILY MEDICINE
Payer: COMMERCIAL

## 2023-11-30 ENCOUNTER — OFFICE VISIT (OUTPATIENT)
Dept: PHYSICAL THERAPY | Age: 63
End: 2023-11-30
Attending: FAMILY MEDICINE
Payer: COMMERCIAL

## 2023-11-30 DIAGNOSIS — R82.90 ABNORMAL URINALYSIS: ICD-10-CM

## 2023-11-30 LAB
BILIRUB UR QL STRIP.AUTO: NEGATIVE
COLOR UR AUTO: YELLOW
GLUCOSE UR STRIP.AUTO-MCNC: NORMAL MG/DL
HYALINE CASTS #/AREA URNS AUTO: PRESENT /LPF
KETONES UR STRIP.AUTO-MCNC: NEGATIVE MG/DL
LEUKOCYTE ESTERASE UR QL STRIP.AUTO: 500
NITRITE UR QL STRIP.AUTO: NEGATIVE
PH UR STRIP.AUTO: 5.5 [PH] (ref 5–8)
RBC UR QL AUTO: NEGATIVE
SP GR UR STRIP.AUTO: 1.02 (ref 1–1.03)
UROBILINOGEN UR STRIP.AUTO-MCNC: NORMAL MG/DL
WBC #/AREA URNS AUTO: >50 /HPF

## 2023-11-30 PROCEDURE — 87086 URINE CULTURE/COLONY COUNT: CPT

## 2023-11-30 PROCEDURE — 97110 THERAPEUTIC EXERCISES: CPT

## 2023-11-30 PROCEDURE — 81001 URINALYSIS AUTO W/SCOPE: CPT

## 2023-11-30 RX ORDER — VALSARTAN 160 MG/1
160 TABLET ORAL DAILY
Qty: 90 TABLET | Refills: 1 | Status: SHIPPED | OUTPATIENT
Start: 2023-11-30

## 2023-11-30 RX ORDER — SERTRALINE HYDROCHLORIDE 100 MG/1
150 TABLET, FILM COATED ORAL DAILY
Qty: 135 TABLET | Refills: 1 | Status: SHIPPED | OUTPATIENT
Start: 2023-11-30

## 2023-11-30 NOTE — PROGRESS NOTES
Discharge Summary  Pt has attended 10 visits in Physical Therapy. Diagnosis:   Left hip pain (M25.552)  Osteoarthritis of both hips, unspecified osteoarthritis type (M16.0)       Referring Provider: Abelardo Matthews  Date of Evaluation:    10/16/23    Precautions:  None Next MD visit:   none scheduled  Date of Surgery: n/a   Insurance Primary/Secondary: [place] / N/A     # Auth Visits: no auth required            Subjective: Pt is seeing the orthopedic in January. Her mobility has improved but the pain is persistent and has not gone away; this is limiting her walking still   Functional improvements: walking is easier, walking barefoot, stair descent  Continued functional limitations: extended walking, going up stairs, crossing legs, putting on socks and shoes, lower body dressing    Pain: 6-7/10 - sharp with functional limitations listed above       Objective:   Objective measures in bold are today's values  * = pain  All ROM measures are listed in degrees unless otherwise noted    Observation: slightly limited LLE WB in standing; continued  Gait: pt ambulates on level ground with L reduced WB, L trendelenberg, reduced L hip extension; continued  Sit to stand: able to complete without HHA, slight reduced LLE WB; continued  Balance: Tandem: R anterior <10 sec, L anterior 30 sec; 30\" on the second attempt with R anterior with continued pain.   Palpation: guarding at the proximal quad/adductors; continued     AROM: (* denotes performed with pain)  Hip   Flexion: L slightly limited with pain; ;  L continued  Extension: R 5; L 0; L 0      ER: R WNL; L slight limitation;  L continued with pain      Accessory motion: distraction provides relief somewhat to L hip pain; continued     Flexibility:  Hip Flexor: L limited;  L continued     Strength/MMT: (* denotes performed with pain)  Hip Knee   Flexion: R 5/5; L 4/5;  L 4/5  Extension: R 5/5; L 4/5;  L 5/5  Abduction: R 4-/5; L 3/5;  R 4+/5, L 3+/5  ER: R 5/5; L 4/5;  L 5/5 Flexion: R 5/5; L 4+/5;  L 5/5  Extension: R 5/5; L 4+/5;  L 5/5               Assessment: Bakari Dillon has made objective improvements in balance and hip strength. Pt has benefited from skilled PT as shown by reduced pain levels and improved functional limitations including walking is easier, walking barefoot, stair descent. Extensive time discussing HEP and continued activities, expectations with surgeon visit, and potential PT in the future coupled with further medical intervention. Goals: (to be met in 10 visits)  Pt will improve hip ABD and ER strength to at least 4/5 to increase ease with standing and walking -not met due to hip abd strength deficits  Pt will be able to squat to  light objects around the house with <2/10 hip pain-not met 2/2 pain  Pt will improve functional hip strength to report ability to ascend/descend 1 flight of stairs reciprocally without use of handrail -not met 2/2 pain  Pt will demonstrate improved SLS to >10 seconds MARTIN to promote safety and decrease risk of falls on uneven surfaces such as grass and gravel-not met unable however improving  Pt will be independent and compliant with comprehensive HEP to maintain progress achieved in PT -MET      Post LEFS Score  Post LEFS Score: 55 % (11/30/2023  2:14 PM)    -2.5 % improvement    Plan: DC    Patient/Family/Caregiver was advised of these findings, precautions, and treatment options and has agreed to actively participate in planning and for this course of care. Thank you for your referral. If you have any questions, please contact me at Dept: [phone]. Sincerely,  Electronically signed by therapist: Papito Chua, PT     Physician's certification required:  No  Please co-sign or sign and return this letter via fax as soon as possible to [phone]. I certify the need for these services furnished under this plan of treatment and while under my care.     X___________________________________________________ Date____________________    Certification From: 48/38/1832  To:2/28/2024     Date: 11/14/2023  Tx#: 7/10 Date: 11/16/2023  Tx#: 8/10   Date: 11/21/23  Tx# 9/10 Date: 11/30/2023  Tx#: 10/10     Manual: 25 min    TPDN: hip flexors, TFL Manual: 18 min    STM: Glute med, piriformis, lateral HS, TFL, quad     Ther-Ex: 20 min  Hip flexor stretch EOT, 2x30\"   LAQ in modified [place], 2x10  Short range SLR with relaxation, 2x10  Bridge with abd, blue TB, 2x10   Clams, blue TB, 2x10 B  HEP update Ther-Ex: 35 min  Sidelying hip abduction with therapist assist, 1x10  Supine march, 2x10 B  Supine leg ext, 2x10 B  Supine BKFO, 2x10 B  Clam, 2x10   Reverse clam, 2x10   DLP/SLP, 125#/75#, 3x10  TherEx: 45min  NuStep 5min lvl 1   Sidelying hip abduction 2 x 10  Sidelying hip abduction to extension combination 2 x 10  4\" step up fwd x 15  6\" step up fwd x 15  6\" step up lateral x 15  TRX squat 2 x 15  Marches on Airex x 20       Measurements 10 min      HEP update review and perform see below   HEP: Access Code: [email or web address]  URL: [email or web address]  Date: 11/30/2023  Prepared by: Manus Elizabet    Exercises  - Bridge with Hip Abduction and Resistance  - 1 x daily - 7 x weekly - 3 sets - 10 reps - blue band  - Clamshell with Resistance  - 1 x daily - 7 x weekly - 2 sets - 15 reps - blue band  - Supine Figure 4 Piriformis Stretch  - 1 x daily - 7 x weekly - 3 sets - 30\" hold  - Supine Piriformis Stretch with Foot on Ground  - 1 x daily - 7 x weekly - 3 sets - 30\" hold  - Modified [place] Stretch  - 1 x daily - 7 x weekly - 3 sets - 30\" hold  - Standing Hip Flexor Stretch  - 1 x daily - 7 x weekly - 3 sets - 30\"  hold  - Hip Extension with Resistance Loop  - 1 x daily - 7 x weekly - 3 sets - 10 reps  - Hip Abduction with Resistance Loop  - 1 x daily - 7 x weekly - 3 sets - 10 reps  - Side Stepping with Resistance at Ankles  - 1 x daily - 7 x weekly - 3 laps - green band    Charges: 2 TE         Total Timed Treatment: 25 min  Total Treatment Time: 35 min

## 2023-11-30 NOTE — TELEPHONE ENCOUNTER
Last office visit: 11/29/23 Med check   Labs last completed: 11/28/23  Requested medication(s) are due for refill today:Yes-Checked with [facility], they consider \" 1 refill \" as just one fill with no refills.   Requested medication(s) are on the active medication list same strength, form, dose/ sig: Yes  Requested medication(s) are managed by provider: Yes  Patient has already received a courtsey refill: No

## 2023-12-01 ENCOUNTER — PATIENT MESSAGE (OUTPATIENT)
Dept: FAMILY MEDICINE CLINIC | Facility: CLINIC | Age: 63
End: 2023-12-01

## 2023-12-01 NOTE — TELEPHONE ENCOUNTER
Pt was advised that urine culture is pending  Dr. Dashawn Mendez saw the urinalysis result   Will call when resulted with recommendations

## 2023-12-01 NOTE — TELEPHONE ENCOUNTER
From: Jyoti Denise  To: Maria L Erickson  Sent: 12/1/2023 10:08 AM CST  Subject: Urine test    I saw results of 2nd urine test. Should I start antibiotics ?

## 2024-01-03 ENCOUNTER — HOSPITAL ENCOUNTER (OUTPATIENT)
Dept: GENERAL RADIOLOGY | Age: 64
Discharge: HOME | End: 2024-01-03
Attending: ORTHOPAEDIC SURGERY
Payer: COMMERCIAL

## 2024-01-03 ENCOUNTER — OFFICE VISIT (OUTPATIENT)
Dept: ORTHOPEDICS CLINIC | Facility: CLINIC | Age: 64
End: 2024-01-03
Payer: COMMERCIAL

## 2024-01-03 VITALS — WEIGHT: 293 LBS | BODY MASS INDEX: 45.99 KG/M2 | HEIGHT: 67 IN

## 2024-01-03 DIAGNOSIS — E66.01 MORBID OBESITY (HCC): ICD-10-CM

## 2024-01-03 DIAGNOSIS — M25.552 LEFT HIP PAIN: ICD-10-CM

## 2024-01-03 DIAGNOSIS — M16.12 PRIMARY OSTEOARTHRITIS OF LEFT HIP: Primary | ICD-10-CM

## 2024-01-03 PROCEDURE — 73502 X-RAY EXAM HIP UNI 2-3 VIEWS: CPT | Performed by: ORTHOPAEDIC SURGERY

## 2024-01-03 PROCEDURE — 99203 OFFICE O/P NEW LOW 30 MIN: CPT | Performed by: ORTHOPAEDIC SURGERY

## 2024-01-03 PROCEDURE — 3008F BODY MASS INDEX DOCD: CPT | Performed by: ORTHOPAEDIC SURGERY

## 2024-01-03 NOTE — H&P
[facility] New Patient Note    CC:   Chief Complaint   Patient presents with    Hip Pain     Left hip pain        HPI: This 63 year old female presents today with complaints of left hip pain.  She reports that this has been going on for a while, worsening recently.  Pain is around the groin on the left side.  She does report she can reproduce the pain with palpation.  States pain is deep in the joint, worse with weightbearing as well as attempted motion of the hip, demonstrates attempting to put the left foot up onto the right knee for socks and shoes.  She states that she did do physical therapy, this did help somewhat but she does have persistent pain around the front of the hip.  She does take over-the-counter ibuprofen, 2 tablets a day as needed.  She has not done injection.  She does report that she has a referral to [facility].    Past Medical History:   Diagnosis Date    Abdominal pain past 2-3 weeks    lower left    Anxiety occassionally    Bloating     Constipation past 2-3 weeks    Dyspareunia     Fibroids     Frequent urination 2 years    [facility]  Dr. Jiang    Heartburn 2015    carlton/caffeine triggered    Hemorrhoids     High cholesterol 5 years    History of depression     Irregular bowel habits     Leaking of urine     Mixed hyperlipidemia     Neoplasm     vaginal wall    Noncompliance     Obesity     Pain in joints 2018    left knee    PMDD (premenstrual dysphoric disorder)     PONV (postoperative nausea and vomiting)     Visual impairment     glasses prn    Vitamin D deficiency     Wears glasses     cataract surgery right eye 2019     Past Surgical History:   Procedure Laterality Date    BREAST BIOPSY      total of 5-6    CATARACT EXTRACTION W/  INTRAOCULAR LENS IMPLANT Right 10/2019    Dr. Cerna    COLONOSCOPY N/A 07/17/2020    repeat 5 yrs, tubular adenoma, Aubree Brown DO;  Location:  ENDOSCOPY    ENDOMETRIAL ABLATION  2005/6 & ~2014 2/2 DUB    [facility] LOCALIZATION WIRE 1 SITE LEFT  (CPT=19281) Left 2010    2 at same time, both benign.    NEEDLE BIOPSY LEFT Left 2016    ultra sound guided bx, lipoma.    NEEDLE BIOPSY RIGHT Right 11/2016    ultra sound guided bx, lipoma.    OTHER SURGICAL HISTORY  01/2023    parathyroid nodule removal    TONSILLECTOMY  1967     Current Outpatient Medications   Medication Sig Dispense Refill    valsartan 160 MG Oral Tab Take 1 tablet (160 mg total) by mouth daily. 90 tablet 1    sertraline 100 MG Oral Tab Take 1.5 tablets (150 mg total) by mouth daily. 135 tablet 1    triamcinolone 0.1 % External Cream Apply 1 Application topically 2 (two) times daily. Apply to scar twice daily 45 g 0    Multiple Vitamins-Minerals (ONE DAILY CALCIUM/IRON) Oral Tab Take 1 tablet by mouth daily.      loratadine-pseudoephedrine ER  MG Oral Tablet 24 Hr Take 1 tablet by mouth daily. 30 tablet 1    azelastine 0.1 % Nasal Solution 2 sprays by Nasal route 2 (two) times daily. 30 mL 0    montelukast 10 MG Oral Tab Take 1 tablet (10 mg total) by mouth nightly. 30 tablet 3    OMEPRAZOLE 40 MG Oral Capsule Delayed Release TAKE 1 CAPSULE DAILY 90 capsule 1    fexofenadine 180 MG Oral Tab Take 1 tablet (180 mg total) by mouth as needed.      Cholecalciferol (VITAMIN D) 25 MCG (1000 UT) Oral Tab Take 4,000 Units by mouth daily.      ALPRAZolam 0.25 MG Oral Tab Take 1 tablet (0.25 mg total) by mouth daily as needed (panic attacks). 15 tablet 0    Polyethylene Glycol 3350 17 g Oral Powd Pack Take 17 g by mouth daily as needed (constipation).      docusate sodium 100 MG Oral Cap Take 1 capsule (100 mg total) by mouth 2 (two) times daily.      omega-3 fatty acids 1000 MG Oral Cap Take 1,000 mg by mouth daily.      aspirin 81 MG Oral Chew Tab Chew 1 tablet (81 mg total) by mouth daily.      mometasone (NASONEX) 50 MCG/ACT Nasal Suspension 2 sprays by Nasal route as needed. 17 g 6    Multiple Vitamin (MULTI-VITAMIN DAILY OR) Take 1 tablet by mouth daily.         Allergies   Allergen Reactions     Adhesive Tape      Adhesive Bandages      Dander      Cats      Seasonal OTHER (SEE COMMENTS)     Watery eyes & stuffiness     Family History   Problem Relation Age of Onset    Breast Cancer Mother 57    Other (lung cancer) Father     No Known Problems Sister     Diabetes Maternal Grandmother     Heart Attack Paternal Grandfather     Kidney Disease Paternal Grandmother         overused OTC med so likely nsaids    Other (lung cancer) Maternal Grandfather      Social History     Occupational History    Not on file   Tobacco Use    Smoking status: Former     Packs/day: 1.00     Years: 12.00     Additional pack years: 0.00     Total pack years: 12.00     Types: Cigarettes     Start date: 1973     Quit date: 1985     Years since quittin.0    Smokeless tobacco: Never   Vaping Use    Vaping Use: Never used   Substance and Sexual Activity    Alcohol use: Yes     Comment: 1/yr if that, never a problem    Drug use: No    Sexual activity: Yes     Partners: Male        ROS:  Detailed system review obtained and negative except as mentioned above      Physical Exam:    Ht 5' 7\" (1.702 m)   Wt (!) 311 lb (141.1 kg)   LMP 05/15/2008   BMI 48.71 kg/m²   Constitutional: Awake, alert, no distress.  Very pleasant  Psychological: Appropriate affect.  Respiratory: Unlabored breathing.  Left lower extremity:  Inspection: skin intact, no lesions, no effusion, thickened soft tissue envelope about the hip and thigh  Palpation: No focal tenderness proximal lateral thigh/greater trochanter  Range of motion: Passive hip external and internal rotation does reproduce discomfort in the groin region  [facility] positive  Neuromuscular: 5 out of 5 hip flexion strength, sensation intact  Vascular: Perfused      Imaging: X-rays of the left hip and pelvis personally viewed, independently interpreted and radiology report read.  Demonstrates Kellgren-Kaushal grade 3 moderate osteoarthritis of the left hip with near complete loss of  joint space, osteophyte formation and subchondral sclerosis      Assessment/Plan:  Diagnoses and all orders for this visit:    Primary osteoarthritis of left hip  -     Cancel: XR ASPIR/INJ MAJOR JOINT W/FLUORO LT(CPT=77002/64036); Future  -     XR ASPIR/INJ MAJOR JOINT W/FLUORO LT(CPT=77002/64827); Future    Morbid obesity (HCC)      Assessment: 63-year-old female with symptomatic radiographically moderate left hip osteoarthritis    Plan: I discussed the etiology, natural history, and management options for symptomatic hip osteoarthritis.  I discussed nonsurgical and surgical treatments, with nonsurgical treatments to include anti-inflammatory medications, injections, activity modification, weight loss, low impact exercise and possible therapy.  Surgery would be with hip replacement and is an elective operation reserved for when nonsurgical treatments no longer alleviate symptoms sufficiently.  From a surgical standpoint, discussed that at this point would recommend optimizing/improving body mass index due to the significantly increased risk of complications including wound healing and infection from soft tissue thickness and BMI.  She does report that she has a [facility] referral that she would like to follow through with.  Advised her that our goal is to get her through eventual hip replacement surgery with minimizing risk of complication and therefore this would be the next steps to take to work towards this eventual goal.  For the time being, she has not attempted some other nonsurgical treatments including injection, prescription anti-inflammatory.  She is most interested in attempting hip injection especially if she is going to [place] for 3 months at the end of this month (recently bought a house out there).  Will provide a referral to interventional radiology for injection procedure.  Advised this can be repeated as often as every 3 months as needed.    Juancho Kamara MD,  FAAOS  [facility]  Phone [phone]  Fax [phone]

## 2024-01-04 RX ORDER — AZELASTINE 1 MG/ML
2 SPRAY, METERED NASAL 2 TIMES DAILY
Qty: 30 ML | Refills: 0 | Status: SHIPPED | OUTPATIENT
Start: 2024-01-04

## 2024-01-16 ENCOUNTER — HOSPITAL ENCOUNTER (OUTPATIENT)
Dept: GENERAL RADIOLOGY | Facility: HOSPITAL | Age: 64
Discharge: HOME | End: 2024-01-16
Attending: ORTHOPAEDIC SURGERY
Payer: COMMERCIAL

## 2024-01-16 DIAGNOSIS — M16.12 PRIMARY OSTEOARTHRITIS OF LEFT HIP: ICD-10-CM

## 2024-01-16 PROCEDURE — 77002 NEEDLE LOCALIZATION BY XRAY: CPT | Performed by: ORTHOPAEDIC SURGERY

## 2024-01-16 PROCEDURE — 20610 DRAIN/INJ JOINT/BURSA W/O US: CPT | Performed by: ORTHOPAEDIC SURGERY

## 2024-01-16 RX ORDER — IOPAMIDOL 612 MG/ML
15 INJECTION, SOLUTION INTRATHECAL
Status: COMPLETED | OUTPATIENT
Start: 2024-01-16 | End: 2024-01-16

## 2024-01-16 RX ORDER — BUPIVACAINE HYDROCHLORIDE 5 MG/ML
INJECTION, SOLUTION EPIDURAL; INTRACAUDAL
Status: COMPLETED
Start: 2024-01-16 | End: 2024-01-16

## 2024-01-16 RX ORDER — TRIAMCINOLONE ACETONIDE 40 MG/ML
INJECTION, SUSPENSION INTRA-ARTICULAR; INTRAMUSCULAR
Status: COMPLETED
Start: 2024-01-16 | End: 2024-01-16

## 2024-01-31 ENCOUNTER — PATIENT MESSAGE (OUTPATIENT)
Dept: ORTHOPEDICS CLINIC | Facility: CLINIC | Age: 64
End: 2024-01-31

## 2024-01-31 DIAGNOSIS — M16.12 PRIMARY OSTEOARTHRITIS OF LEFT HIP: Primary | ICD-10-CM

## 2024-01-31 RX ORDER — METHYLPREDNISOLONE 4 MG/1
TABLET ORAL
Qty: 21 EACH | Refills: 0 | Status: SHIPPED | OUTPATIENT
Start: 2024-01-31

## 2024-01-31 NOTE — TELEPHONE ENCOUNTER
From: Deonna Vela  To: Juancho Kamara  Sent: 1/31/2024 10:59 AM CST  Subject: Problem w/ Steroid Injection Hip     Had steroid injection in my hip two weeks ago. Since last week my pain level has significantly increased. Now my entire leg hurts. Particularly the quad, the side of the hip even the lower leg. I now also have trouble putting any weight on the leg which makes walking difficult. Can the injection cause this? Is there anything I can do to reduce the pain?

## 2024-01-31 NOTE — TELEPHONE ENCOUNTER
LOV 1/3/24  Steroid procedure: 1/16/24, via interventional radiology.    Reports significantly worsened pain since steroid injection \"particularly\" the quad, side of hip, lower leg.  Pain started shortly after injection and has not lessened.  Pain 6-7/10.  Difficulty with bearing weight and ambulating.    Taking ibuprofen, up to 800mg at a time - reports only 800mg makes any difference in pain level   Icing, elevating.    Denies fever, redness, fever, increased warmth at joint, drainage at injection site.    Conservative measures also sent.    Leaves for [place] for 2 months this Saturday.

## 2024-02-19 RX ORDER — OMEPRAZOLE 40 MG/1
40 CAPSULE, DELAYED RELEASE ORAL DAILY
Qty: 90 CAPSULE | Refills: 0 | Status: SHIPPED | OUTPATIENT
Start: 2024-02-19

## 2024-02-19 NOTE — TELEPHONE ENCOUNTER
Last office visit: 11/29/23   Protocol: Pass  Requested medication(s) are due for refill today: Yes  Requested medication(s) are on the active medication list same strength, form, dose/ sig: Yes  Requested medication(s) are managed by provider: Yes  Patient has already received a courtsey refill: No    NOV: 4/16/24  Pre op visit

## 2024-02-20 RX ORDER — OMEPRAZOLE 40 MG/1
40 CAPSULE, DELAYED RELEASE ORAL DAILY
Qty: 90 CAPSULE | Refills: 1 | OUTPATIENT
Start: 2024-02-20

## 2024-04-15 ENCOUNTER — LAB ENCOUNTER (OUTPATIENT)
Dept: LAB | Age: 64
End: 2024-04-15
Attending: FAMILY MEDICINE
Payer: COMMERCIAL

## 2024-04-15 DIAGNOSIS — R73.03 PREDIABETES: ICD-10-CM

## 2024-04-15 DIAGNOSIS — Z00.00 LABORATORY EXAMINATION ORDERED AS PART OF A ROUTINE GENERAL MEDICAL EXAMINATION: ICD-10-CM

## 2024-04-15 DIAGNOSIS — E55.9 VITAMIN D DEFICIENCY: ICD-10-CM

## 2024-04-15 DIAGNOSIS — Z13.89 SCREENING FOR GENITOURINARY CONDITION: ICD-10-CM

## 2024-04-15 LAB
ALBUMIN SERPL-MCNC: 3.4 G/DL (ref 3.4–5)
ALBUMIN/GLOB SERPL: 0.8 {RATIO} (ref 1–2)
ALP LIVER SERPL-CCNC: 100 U/L
ALT SERPL-CCNC: 21 U/L
ANION GAP SERPL CALC-SCNC: 9 MMOL/L (ref 0–18)
AST SERPL-CCNC: 25 U/L (ref 15–37)
BASOPHILS # BLD AUTO: 0.04 X10(3) UL (ref 0–0.2)
BASOPHILS NFR BLD AUTO: 0.6 %
BILIRUB SERPL-MCNC: 0.3 MG/DL (ref 0.1–2)
BILIRUB UR QL STRIP.AUTO: NEGATIVE
BUN BLD-MCNC: 18 MG/DL (ref 9–23)
CALCIUM BLD-MCNC: 9.4 MG/DL (ref 8.5–10.1)
CHLORIDE SERPL-SCNC: 110 MMOL/L (ref 98–112)
CHOLEST SERPL-MCNC: 232 MG/DL (ref ?–200)
CO2 SERPL-SCNC: 23 MMOL/L (ref 21–32)
COLOR UR AUTO: YELLOW
CREAT BLD-MCNC: 0.98 MG/DL
CREAT UR-SCNC: 263 MG/DL
EGFRCR SERPLBLD CKD-EPI 2021: 64 ML/MIN/1.73M2 (ref 60–?)
EOSINOPHIL # BLD AUTO: 0.52 X10(3) UL (ref 0–0.7)
EOSINOPHIL NFR BLD AUTO: 7.7 %
ERYTHROCYTE [DISTWIDTH] IN BLOOD BY AUTOMATED COUNT: 14.8 %
EST. AVERAGE GLUCOSE BLD GHB EST-MCNC: 131 MG/DL (ref 68–126)
FASTING PATIENT LIPID ANSWER: YES
FASTING STATUS PATIENT QL REPORTED: YES
GLOBULIN PLAS-MCNC: 4.4 G/DL (ref 2.8–4.4)
GLUCOSE BLD-MCNC: 122 MG/DL (ref 70–99)
GLUCOSE UR STRIP.AUTO-MCNC: NORMAL MG/DL
HBA1C MFR BLD: 6.2 % (ref ?–5.7)
HCT VFR BLD AUTO: 40.2 %
HDLC SERPL-MCNC: 46 MG/DL (ref 40–59)
HGB BLD-MCNC: 13 G/DL
IMM GRANULOCYTES # BLD AUTO: 0.02 X10(3) UL (ref 0–1)
IMM GRANULOCYTES NFR BLD: 0.3 %
KETONES UR STRIP.AUTO-MCNC: NEGATIVE MG/DL
LDLC SERPL CALC-MCNC: 145 MG/DL (ref ?–100)
LEUKOCYTE ESTERASE UR QL STRIP.AUTO: 75
LYMPHOCYTES # BLD AUTO: 2.01 X10(3) UL (ref 1–4)
LYMPHOCYTES NFR BLD AUTO: 29.8 %
MCH RBC QN AUTO: 29.3 PG (ref 26–34)
MCHC RBC AUTO-ENTMCNC: 32.3 G/DL (ref 31–37)
MCV RBC AUTO: 90.5 FL
MICROALBUMIN UR-MCNC: 2.39 MG/DL
MICROALBUMIN/CREAT 24H UR-RTO: 9.1 UG/MG (ref ?–30)
MONOCYTES # BLD AUTO: 0.54 X10(3) UL (ref 0.1–1)
MONOCYTES NFR BLD AUTO: 8 %
NEUTROPHILS # BLD AUTO: 3.61 X10 (3) UL (ref 1.5–7.7)
NEUTROPHILS # BLD AUTO: 3.61 X10(3) UL (ref 1.5–7.7)
NEUTROPHILS NFR BLD AUTO: 53.6 %
NITRITE UR QL STRIP.AUTO: NEGATIVE
NONHDLC SERPL-MCNC: 186 MG/DL (ref ?–130)
OSMOLALITY SERPL CALC.SUM OF ELEC: 297 MOSM/KG (ref 275–295)
PH UR STRIP.AUTO: 5.5 [PH] (ref 5–8)
PLATELET # BLD AUTO: 235 10(3)UL (ref 150–450)
POTASSIUM SERPL-SCNC: 4.2 MMOL/L (ref 3.5–5.1)
PROT SERPL-MCNC: 7.8 G/DL (ref 6.4–8.2)
PROT UR STRIP.AUTO-MCNC: NEGATIVE MG/DL
RBC # BLD AUTO: 4.44 X10(6)UL
RBC UR QL AUTO: NEGATIVE
SODIUM SERPL-SCNC: 142 MMOL/L (ref 136–145)
SP GR UR STRIP.AUTO: 1.02 (ref 1–1.03)
TRIGL SERPL-MCNC: 224 MG/DL (ref 30–149)
TSI SER-ACNC: 1.38 MIU/ML (ref 0.36–3.74)
UROBILINOGEN UR STRIP.AUTO-MCNC: NORMAL MG/DL
VIT D+METAB SERPL-MCNC: 38.4 NG/ML (ref 30–100)
VLDLC SERPL CALC-MCNC: 43 MG/DL (ref 0–30)
WBC # BLD AUTO: 6.7 X10(3) UL (ref 4–11)

## 2024-04-15 PROCEDURE — 85025 COMPLETE CBC W/AUTO DIFF WBC: CPT

## 2024-04-15 PROCEDURE — 82043 UR ALBUMIN QUANTITATIVE: CPT

## 2024-04-15 PROCEDURE — 80061 LIPID PANEL: CPT

## 2024-04-15 PROCEDURE — 80053 COMPREHEN METABOLIC PANEL: CPT

## 2024-04-15 PROCEDURE — 82570 ASSAY OF URINE CREATININE: CPT

## 2024-04-15 PROCEDURE — 83036 HEMOGLOBIN GLYCOSYLATED A1C: CPT

## 2024-04-15 PROCEDURE — 84443 ASSAY THYROID STIM HORMONE: CPT

## 2024-04-15 PROCEDURE — 81001 URINALYSIS AUTO W/SCOPE: CPT

## 2024-04-15 PROCEDURE — 82306 VITAMIN D 25 HYDROXY: CPT

## 2024-04-15 RX ORDER — EVOLOCUMAB 140 MG/ML
1 INJECTION, SOLUTION SUBCUTANEOUS
COMMUNITY
Start: 2024-01-30

## 2024-04-15 NOTE — PROGRESS NOTES
Surgery: L eye cataract  Surgeon: Itz Sarmiento  Location: [facility] [place]  DOS: 05/08/2024

## 2024-04-16 ENCOUNTER — OFFICE VISIT (OUTPATIENT)
Dept: FAMILY MEDICINE CLINIC | Facility: CLINIC | Age: 64
End: 2024-04-16
Payer: COMMERCIAL

## 2024-04-16 ENCOUNTER — TELEPHONE (OUTPATIENT)
Dept: FAMILY MEDICINE CLINIC | Facility: CLINIC | Age: 64
End: 2024-04-16

## 2024-04-16 VITALS
WEIGHT: 293 LBS | BODY MASS INDEX: 45.99 KG/M2 | HEART RATE: 72 BPM | OXYGEN SATURATION: 96 % | DIASTOLIC BLOOD PRESSURE: 80 MMHG | SYSTOLIC BLOOD PRESSURE: 142 MMHG | HEIGHT: 67 IN | RESPIRATION RATE: 18 BRPM

## 2024-04-16 DIAGNOSIS — F33.1 MODERATE EPISODE OF RECURRENT MAJOR DEPRESSIVE DISORDER (HCC): ICD-10-CM

## 2024-04-16 DIAGNOSIS — K21.9 GASTROESOPHAGEAL REFLUX DISEASE, UNSPECIFIED WHETHER ESOPHAGITIS PRESENT: ICD-10-CM

## 2024-04-16 DIAGNOSIS — E55.9 VITAMIN D DEFICIENCY: ICD-10-CM

## 2024-04-16 DIAGNOSIS — F41.9 ANXIETY: ICD-10-CM

## 2024-04-16 DIAGNOSIS — M16.12 PRIMARY OSTEOARTHRITIS OF LEFT HIP: ICD-10-CM

## 2024-04-16 DIAGNOSIS — H25.9 SENILE CATARACT OF LEFT EYE, UNSPECIFIED AGE-RELATED CATARACT TYPE: ICD-10-CM

## 2024-04-16 DIAGNOSIS — E66.01 MORBID OBESITY WITH BMI OF 45.0-49.9, ADULT (HCC): ICD-10-CM

## 2024-04-16 DIAGNOSIS — M25.552 LEFT HIP PAIN: ICD-10-CM

## 2024-04-16 DIAGNOSIS — Z98.890 H/O LEFT BREAST BIOPSY: ICD-10-CM

## 2024-04-16 DIAGNOSIS — E78.5 DYSLIPIDEMIA: ICD-10-CM

## 2024-04-16 DIAGNOSIS — R06.81 APNEA: ICD-10-CM

## 2024-04-16 DIAGNOSIS — I25.10 CORONARY ARTERY DISEASE INVOLVING NATIVE CORONARY ARTERY OF NATIVE HEART WITHOUT ANGINA PECTORIS: ICD-10-CM

## 2024-04-16 DIAGNOSIS — R73.03 PREDIABETES: ICD-10-CM

## 2024-04-16 DIAGNOSIS — Z98.41 HISTORY OF CATARACT EXTRACTION, RIGHT: ICD-10-CM

## 2024-04-16 DIAGNOSIS — Z01.818 PREOP EXAMINATION: Primary | ICD-10-CM

## 2024-04-16 DIAGNOSIS — Z79.899 MEDICATION MANAGEMENT: ICD-10-CM

## 2024-04-16 DIAGNOSIS — Z80.3 FAMILY HISTORY OF BREAST CANCER IN MOTHER: ICD-10-CM

## 2024-04-16 DIAGNOSIS — Z71.85 VACCINE COUNSELING: ICD-10-CM

## 2024-04-16 DIAGNOSIS — I10 ESSENTIAL HYPERTENSION: ICD-10-CM

## 2024-04-16 PROBLEM — M16.32 OSTEOARTHRITIS OF LEFT HIP JOINT DUE TO DYSPLASIA: Status: ACTIVE | Noted: 2024-02-19

## 2024-04-16 PROCEDURE — 3079F DIAST BP 80-89 MM HG: CPT | Performed by: FAMILY MEDICINE

## 2024-04-16 PROCEDURE — 3008F BODY MASS INDEX DOCD: CPT | Performed by: FAMILY MEDICINE

## 2024-04-16 PROCEDURE — 99244 OFF/OP CNSLTJ NEW/EST MOD 40: CPT | Performed by: FAMILY MEDICINE

## 2024-04-16 PROCEDURE — 96127 BRIEF EMOTIONAL/BEHAV ASSMT: CPT | Performed by: FAMILY MEDICINE

## 2024-04-16 PROCEDURE — 93000 ELECTROCARDIOGRAM COMPLETE: CPT | Performed by: FAMILY MEDICINE

## 2024-04-16 PROCEDURE — 3077F SYST BP >= 140 MM HG: CPT | Performed by: FAMILY MEDICINE

## 2024-04-16 RX ORDER — OMEPRAZOLE 40 MG/1
40 CAPSULE, DELAYED RELEASE ORAL DAILY
Qty: 90 CAPSULE | Refills: 1 | Status: SHIPPED | OUTPATIENT
Start: 2024-04-16 | End: 2024-04-16

## 2024-04-16 RX ORDER — MELOXICAM 15 MG/1
1 TABLET ORAL DAILY
COMMUNITY
Start: 2024-02-20

## 2024-04-16 RX ORDER — VALSARTAN 160 MG/1
160 TABLET ORAL DAILY
Qty: 90 TABLET | Refills: 1 | Status: SHIPPED | OUTPATIENT
Start: 2024-04-16

## 2024-04-16 RX ORDER — SERTRALINE HYDROCHLORIDE 100 MG/1
150 TABLET, FILM COATED ORAL DAILY
Qty: 135 TABLET | Refills: 1 | Status: SHIPPED | OUTPATIENT
Start: 2024-04-16

## 2024-04-16 RX ORDER — OMEPRAZOLE 40 MG/1
40 CAPSULE, DELAYED RELEASE ORAL 2 TIMES DAILY
Qty: 180 CAPSULE | Refills: 1 | Status: SHIPPED | OUTPATIENT
Start: 2024-04-16

## 2024-04-16 NOTE — H&P
Deonna Vela is a 63 year old female who presents for a pre-operative physical exam. Patient is to have cataract extraction with intraocular lens implants of the left eye, to be done by Dr. Choi at [facility] [place] on May 8, 2024.      HPI:   Pt complains of left cataracts and left hip pain.  Patient is using a cane currently due to severe left hip pain which worsened after hip injection.  She was recently given meloxicam from an orthopedic in [place] which she states is not helping much.  She will follow-up with Dr. Kamara.  She is frustrated because she needs to lose weight but how is she supposed to do that when she is not exercising.  She is not interested in the injectables at this time but is open to trying phentermine.  Patient has an appointment with her cardiologist, Dr. Krueger tomorrow.    Current Outpatient Medications   Medication Sig Dispense Refill    Meloxicam 15 MG Oral Tab Take 1 tablet (15 mg total) by mouth daily.      valsartan 160 MG Oral Tab Take 1 tablet (160 mg total) by mouth daily. 90 tablet 1    sertraline 100 MG Oral Tab Take 1.5 tablets (150 mg total) by mouth daily. 135 tablet 1    Omeprazole 40 MG Oral Capsule Delayed Release Take 1 capsule (40 mg total) by mouth in the morning and 1 capsule (40 mg total) before bedtime. 180 capsule 1    REPATHA SURECLICK 140 MG/ML Subcutaneous Solution Auto-injector Inject 1 mL into the skin every 14 (fourteen) days.      AZELASTINE 0.1 % Nasal Solution USE 2 SPRAYS IN EACH NOSTRIL TWICE DAILY 30 mL 0    triamcinolone 0.1 % External Cream Apply 1 Application topically 2 (two) times daily. Apply to scar twice daily 45 g 0    Multiple Vitamins-Minerals (ONE DAILY CALCIUM/IRON) Oral Tab Take 1 tablet by mouth daily.      loratadine-pseudoephedrine ER  MG Oral Tablet 24 Hr Take 1 tablet by mouth daily. 30 tablet 1    montelukast 10 MG Oral Tab Take 1 tablet (10 mg total) by mouth nightly. 30 tablet 3    fexofenadine  180 MG Oral Tab Take 1 tablet (180 mg total) by mouth as needed.      Cholecalciferol (VITAMIN D) 25 MCG (1000 UT) Oral Tab Take 4,000 Units by mouth daily.      ALPRAZolam 0.25 MG Oral Tab Take 1 tablet (0.25 mg total) by mouth daily as needed (panic attacks). 15 tablet 0    Polyethylene Glycol 3350 17 g Oral Powd Pack Take 17 g by mouth daily as needed (constipation).      docusate sodium 100 MG Oral Cap Take 1 capsule (100 mg total) by mouth 2 (two) times daily.      omega-3 fatty acids 1000 MG Oral Cap Take 1,000 mg by mouth daily.      aspirin 81 MG Oral Chew Tab Chew 1 tablet (81 mg total) by mouth daily.      mometasone (NASONEX) 50 MCG/ACT Nasal Suspension 2 sprays by Nasal route as needed. 17 g 6      Allergies:   Allergies   Allergen Reactions    Adhesive Tape      Adhesive Bandages      Dander      Cats      Seasonal OTHER (SEE COMMENTS)     Watery eyes & stuffiness      Past Medical History:    Abdominal pain    lower left    Anxiety    Bloating    Constipation    Dyspareunia    Fibroids    Frequent urination    OAB  Dr. Jiang    Heartburn    carlton/caffeine triggered    Hemorrhoids    High cholesterol    History of depression    Irregular bowel habits    Leaking of urine    Mixed hyperlipidemia    Neoplasm    vaginal wall    Noncompliance    Obesity    Pain in joints    left knee    PMDD (premenstrual dysphoric disorder)    PONV (postoperative nausea and vomiting)    Visual impairment    glasses prn    Vitamin D deficiency    Wears glasses    cataract surgery right eye 2019      Past Surgical History:   Procedure Laterality Date    Breast biopsy      total of 5-6    Cataract extraction w/  intraocular lens implant Right 10/2019    Dr. Cerna    Colonoscopy N/A 07/17/2020    repeat 5 yrs, tubular adenoma, Aubree Brown, DO;  Location:  ENDOSCOPY    Endometrial ablation  2005/6 & ~2014 2/2 DUB    [facility] localization wire 1 site left (cpt=19281) Left 2010    2 at same time, both benign.    Needle  biopsy left Left     ultra sound guided bx, lipoma.    Needle biopsy right Right 2016    ultra sound guided bx, lipoma.    Other surgical history  2023    benign parotid gland nodule removal    Tonsillectomy  1967      Family History   Problem Relation Age of Onset    Breast Cancer Mother 57    Other (lung cancer) Father     No Known Problems Sister     Diabetes Maternal Grandmother     Heart Attack Paternal Grandfather     Kidney Disease Paternal Grandmother         overused OTC med so likely nsaids    Other (lung cancer) Maternal Grandfather       Social History:   Social History     Socioeconomic History    Marital status:    Tobacco Use    Smoking status: Former     Current packs/day: 0.00     Average packs/day: 1 pack/day for 12.0 years (12.0 ttl pk-yrs)     Types: Cigarettes     Start date: 1973     Quit date: 1985     Years since quittin.3    Smokeless tobacco: Never   Vaping Use    Vaping status: Never Used   Substance and Sexual Activity    Alcohol use: Yes     Comment: 1/yr if that, never a problem    Drug use: No    Sexual activity: Yes     Partners: Male   Other Topics Concern    Exercise Yes     Social Determinants of Health     Financial Resource Strain: Low Risk  (2023)    Received from [facility]    Overall Financial Resource Strain (CARDIA)     Difficulty of Paying Living Expenses: Not hard at all   Food Insecurity: No Food Insecurity (2023)    Received from [facility]    Hunger Vital Sign     Worried About Running Out of Food in the Last Year: Never true     Ran Out of Food in the Last Year: Never true   Transportation Needs: No Transportation Needs (2023)    Received from [facility]    PRAPARE - Transportation     Lack of Transportation (Medical): No     Lack of Transportation (Non-Medical): No   Physical Activity: Insufficiently Active (2023)    Received from [facility]    Exercise Vital Sign      Days of Exercise per Week: 2 days     Minutes of Exercise per Session: 40 min   Stress: Stress Concern Present (1/17/2023)    Received from [facility]    [employer] [place] [employer] - Occupational Stress Questionnaire     Feeling of Stress : Rather much   Social Connections: Unknown (1/17/2023)    Received from [facility]    Social Connection and Isolation Panel [NHANES]     Frequency of Communication with Friends and Family: More than three times a week     Frequency of Social Gatherings with Friends and Family: Once a week     Attends Jewish Services: Patient declined     Active Member of Clubs or Organizations: No     Attends Club or Organization Meetings: Patient declined     Marital Status:    Housing Stability: Low Risk  (1/17/2023)    Received from [facility]    Housing Stability Vital Sign     Unable to Pay for Housing in the Last Year: No     Number of Places Lived in the Last Year: 2     Unstable Housing in the Last Year: No      Occ: retired. : y. Children: 0.   Exercise: minimal.  Diet: watches fats closely, watches sugar closely, and watches calories closely     REVIEW OF SYSTEMS:   GENERAL: feels well otherwise  SKIN: denies any unusual skin lesions  EYES:denies blurred vision or double vision  HEENT: denies nasal congestion, sinus pain or ST  LUNGS: denies shortness of breath with exertion  CARDIOVASCULAR: denies chest pain on exertion  GI: denies abdominal pain,denies heartburn  : denies dysuria, menopause  MUSCULOSKELETAL: denies back pain; left hip pain  NEURO: denies headaches  PSYCHE: denies depression or anxiety  HEMATOLOGIC: denies hx of anemia  ENDOCRINE: denies thyroid history  ALL/ASTHMA: denies hx of allergy or asthma    EXAM:   /80   Pulse 72   Resp 18   Ht 5' 7\" (1.702 m)   Wt 300 lb (136.1 kg)   LMP 05/15/2008   SpO2 96%   BMI 46.99 kg/m²   GENERAL: well developed, well nourished,in no apparent  distress  SKIN: no rashes,no suspicious lesions  HEENT: atraumatic, normocephalic,ears and throat are clear  EYES:  EOMI,conjunctiva are clear  NECK: supple,no adenopathy,no bruits; no thyromegaly  CHEST: no chest tenderness  BREAST: DEFERRED  LUNGS: clear to auscultation  CARDIO: RRR without murmur  GI: good BS's,no masses, HSM or tenderness  : DEFERRED  RECTAL: DEFERRED  MUSCULOSKELETAL: back is not tender,FROM of the back  EXTREMITIES: no cyanosis, clubbing or edema  NEURO: Oriented times three,cranial nerves are intact,motor and sensory are grossly intact    ASSESSMENT AND PLAN:   Deonna Vela is a 63 year old female who presents for a pre-operative physical exam. Patient is to have cataract extraction with intraocular lens implant left eye, to be done by Dr. Choi at [facility] on May 8, 2024. Pt has the following conditions:   Encounter Diagnoses   Name Primary?    Preop examination Yes    Senile cataract of left eye, unspecified age-related cataract type     History of cataract extraction, right     Coronary artery disease involving native coronary artery of native heart without angina pectoris     Essential hypertension     Dyslipidemia     Prediabetes     Gastroesophageal reflux disease, unspecified whether esophagitis present     Morbid obesity with BMI of 45.0-49.9, adult (HCC)     Vitamin D deficiency     Anxiety     Moderate episode of recurrent major depressive disorder (HCC)     Primary osteoarthritis of left hip     Apnea     Family history of breast cancer in mother     H/O left breast biopsy     Medication management     Vaccine counseling     Left hip pain        Orders Placed This Encounter   Procedures    Comp Metabolic Panel (14)    Lipid Panel    Hemoglobin A1C       Meds & Refills for this Visit:  Requested Prescriptions     Signed Prescriptions Disp Refills    valsartan 160 MG Oral Tab 90 tablet 1     Sig: Take 1 tablet (160 mg total) by mouth daily.     sertraline 100 MG Oral Tab 135 tablet 1     Sig: Take 1.5 tablets (150 mg total) by mouth daily.    Omeprazole 40 MG Oral Capsule Delayed Release 180 capsule 1     Sig: Take 1 capsule (40 mg total) by mouth in the morning and 1 capsule (40 mg total) before bedtime.       Imaging & Consults:  OP REFERRAL TO [facility]  ACUPUNCTURE [place] - INTERNAL REFERRAL  ELECTROCARDIOGRAM, COMPLETE      Patient will resume physical therapy for her left hip pain and I advise she may try acupuncture as well.  Patient will follow-up with Dr. Krueger for her heart and will need clearance from him.  Patient will follow-up with Dr. Kamara for her left hip.  EKG done today which shows normal sinus rhythm, no acute change, QT interval within normal limits, rate of 59; inf. Infarct -- age undetermined; given copy to share with cards.  Will find out if may use phentermine from cards.  Patient would like to try weight loss clinic with me.  Patient is not interested in bariatric surgery.    Pt has no significant history of  pulmonary conditions. Pt is a good surgical candidate. This consult was sent back the referring physician, Dr. Choi.     Return in about 6 weeks (around 5/28/2024) for OM -15  and 3 months for med check.

## 2024-04-19 ENCOUNTER — TELEPHONE (OUTPATIENT)
Dept: FAMILY MEDICINE CLINIC | Facility: CLINIC | Age: 64
End: 2024-04-19

## 2024-04-19 DIAGNOSIS — Z79.899 MEDICATION MANAGEMENT: Primary | ICD-10-CM

## 2024-04-19 RX ORDER — AZELASTINE 1 MG/ML
2 SPRAY, METERED NASAL 2 TIMES DAILY
Qty: 30 ML | Refills: 0 | Status: SHIPPED | OUTPATIENT
Start: 2024-04-19

## 2024-04-19 NOTE — TELEPHONE ENCOUNTER
Last office visit: 4/16/2024   Protocol: pass  Requested medication(s) are due for refill today: yes  Requested medication(s) are on the active medication list same strength, form, dose/ sig: yes  Requested medication(s) are managed by provider: yes  Patient has already received a courtsey refill: no    NOV: 5/30/2024

## 2024-04-20 ENCOUNTER — PATIENT MESSAGE (OUTPATIENT)
Dept: FAMILY MEDICINE CLINIC | Facility: CLINIC | Age: 64
End: 2024-04-20

## 2024-04-20 DIAGNOSIS — R93.1 ABNORMAL CT SCAN OF HEART: ICD-10-CM

## 2024-04-20 DIAGNOSIS — E78.5 DYSLIPIDEMIA: ICD-10-CM

## 2024-04-20 DIAGNOSIS — I25.10 CORONARY ARTERY DISEASE INVOLVING NATIVE CORONARY ARTERY OF NATIVE HEART WITHOUT ANGINA PECTORIS: ICD-10-CM

## 2024-04-22 ENCOUNTER — HOSPITAL ENCOUNTER (OUTPATIENT)
Dept: GENERAL RADIOLOGY | Age: 64
Discharge: HOME | End: 2024-04-22
Attending: ORTHOPAEDIC SURGERY
Payer: COMMERCIAL

## 2024-04-22 ENCOUNTER — OFFICE VISIT (OUTPATIENT)
Dept: ORTHOPEDICS CLINIC | Facility: CLINIC | Age: 64
End: 2024-04-22
Payer: COMMERCIAL

## 2024-04-22 VITALS — BODY MASS INDEX: 46.53 KG/M2 | WEIGHT: 293 LBS | HEIGHT: 66.5 IN

## 2024-04-22 DIAGNOSIS — E66.01 MORBID OBESITY (HCC): ICD-10-CM

## 2024-04-22 DIAGNOSIS — M16.12 PRIMARY OSTEOARTHRITIS OF LEFT HIP: ICD-10-CM

## 2024-04-22 DIAGNOSIS — M16.12 PRIMARY OSTEOARTHRITIS OF LEFT HIP: Primary | ICD-10-CM

## 2024-04-22 PROCEDURE — 3008F BODY MASS INDEX DOCD: CPT | Performed by: ORTHOPAEDIC SURGERY

## 2024-04-22 PROCEDURE — 73502 X-RAY EXAM HIP UNI 2-3 VIEWS: CPT | Performed by: ORTHOPAEDIC SURGERY

## 2024-04-22 PROCEDURE — 99214 OFFICE O/P EST MOD 30 MIN: CPT | Performed by: ORTHOPAEDIC SURGERY

## 2024-04-22 RX ORDER — EVOLOCUMAB 140 MG/ML
140 INJECTION, SOLUTION SUBCUTANEOUS
Qty: 6 ML | Refills: 1 | Status: SHIPPED | OUTPATIENT
Start: 2024-04-22 | End: 2024-05-22

## 2024-04-22 RX ORDER — CELECOXIB 200 MG/1
200 CAPSULE ORAL 2 TIMES DAILY PRN
Qty: 60 CAPSULE | Refills: 0 | Status: SHIPPED | OUTPATIENT
Start: 2024-04-22

## 2024-04-22 NOTE — PROGRESS NOTES
[facility] Progress Note    Subjective: She returns for her left hip.  Since she was last seen by me nearly 4 months ago, she underwent injection to the left hip by interventional radiology.  She states that this provided no relief, and about 1 week later she had worsening pain throughout the left lower extremity.  Pain is present about the thigh, front and outside, as well as occasionally into the front and lateral side of the leg.  She states it is variable where it presents.  She has more difficulty with ambulation, has begun using a cane.  She was in [place] for 3 months, notes that she saw an orthopedic doctor there who prescribed meloxicam which has not been helping much.  They had discussed possibility of rapidly progressive osteoarthritis.  She has begun working on BMI optimization as well.    Objective: Patient is present with spouse.  She is ambulating with a cane.  BMI today 47.89.      Imaging: Updated x-rays of the left hip personally viewed, independently interpreted.  Demonstrates slight progression of osteoarthritis, previously around 2 mm of joint space now around 1 mm.  Osteophytes remain around the same.      Assessment/Plan: 63-year-old female with symptomatic radiographically severe left hip osteoarthritis.  We discussed potential for other pain generators about the left lower extremity.  From the hip standpoint, there has been progression of osteoarthritis which may be contributing to her symptoms.  We revisited the discussion of treatment options including nonsurgical and surgical.  From a surgical standpoint, advised continued working on optimization of medical comorbidities due to elevated risk of complications.  She has been working on BMI optimization and will continue this with her management team.  From a nonsurgical standpoint, would not recommend any further injections at this point given no relief previously.  We did discuss potential for other prescription NSAID which she would  like to try and a prescription for Celebrex has been sent.  Advised against taking Celebrex while taking meloxicam, she states she is going to stop the meloxicam.  She will continue physical therapy as tolerated, but advised against pushing it too hard with anything causing excessive discomfort.    Juancho Kamara MD, FAAOS  [facility]  Phone [phone]  Fax [phone]

## 2024-04-22 NOTE — TELEPHONE ENCOUNTER
LOV- 4/16/24   NOV 5/30/24     Last refill-11/29/23   Qty-6ml    RF-1     Rx pended for approval  Thank you

## 2024-04-22 NOTE — TELEPHONE ENCOUNTER
From: Deonna Vela  To: Maria L Erickson  Sent: 4/20/2024 10:20 AM CDT  Subject: Repatha/Praulent more    Dr. Krueger prescribed Praulent but as expected insurance denied. They are still going to work on it. But in the meantime I'm due for Repatha so need a refill on that.  Also he cleared me for the phentermine. Did you get something from him on that?  Thanks!

## 2024-04-23 ENCOUNTER — TELEPHONE (OUTPATIENT)
Dept: FAMILY MEDICINE CLINIC | Facility: CLINIC | Age: 64
End: 2024-04-23

## 2024-04-25 ENCOUNTER — PATIENT MESSAGE (OUTPATIENT)
Dept: FAMILY MEDICINE CLINIC | Facility: CLINIC | Age: 64
End: 2024-04-25

## 2024-04-25 DIAGNOSIS — I25.10 CORONARY ARTERY DISEASE INVOLVING NATIVE CORONARY ARTERY OF NATIVE HEART WITHOUT ANGINA PECTORIS: ICD-10-CM

## 2024-04-25 DIAGNOSIS — E78.5 DYSLIPIDEMIA: ICD-10-CM

## 2024-04-25 DIAGNOSIS — E78.2 MIXED HYPERLIPIDEMIA: ICD-10-CM

## 2024-04-25 DIAGNOSIS — E78.00 ELEVATED LDL CHOLESTEROL LEVEL: ICD-10-CM

## 2024-04-25 DIAGNOSIS — R93.1 ABNORMAL CT SCAN OF HEART: ICD-10-CM

## 2024-04-25 DIAGNOSIS — E66.01 MORBID OBESITY WITH BMI OF 45.0-49.9, ADULT (HCC): Primary | ICD-10-CM

## 2024-04-25 RX ORDER — EVOLOCUMAB 140 MG/ML
140 INJECTION, SOLUTION SUBCUTANEOUS
Qty: 6 ML | Refills: 1 | Status: CANCELLED | OUTPATIENT
Start: 2024-04-25 | End: 2024-05-25

## 2024-04-26 DIAGNOSIS — E78.5 DYSLIPIDEMIA: ICD-10-CM

## 2024-04-26 DIAGNOSIS — I25.10 CORONARY ARTERY DISEASE INVOLVING NATIVE CORONARY ARTERY OF NATIVE HEART WITHOUT ANGINA PECTORIS: ICD-10-CM

## 2024-04-26 DIAGNOSIS — R93.1 ABNORMAL CT SCAN OF HEART: ICD-10-CM

## 2024-04-26 RX ORDER — EVOLOCUMAB 140 MG/ML
140 INJECTION, SOLUTION SUBCUTANEOUS
Qty: 6 ML | Refills: 1
Start: 2024-04-26 | End: 2024-05-26

## 2024-04-26 RX ORDER — EVOLOCUMAB 140 MG/ML
140 INJECTION, SOLUTION SUBCUTANEOUS
Qty: 6 ML | Refills: 1 | OUTPATIENT
Start: 2024-04-26 | End: 2024-05-26

## 2024-04-26 RX ORDER — PHENTERMINE HYDROCHLORIDE 37.5 MG/1
37.5 TABLET ORAL
Qty: 30 TABLET | Refills: 1 | Status: SHIPPED | OUTPATIENT
Start: 2024-04-26

## 2024-04-26 NOTE — TELEPHONE ENCOUNTER
Called pt to schedule an OV. Pt states she discussed with Dr. Erickson at last OV (4/16/24) starting the medication once she was cleared by Dr. Ballesteros. Pt has two visits schedule, 5/30/24 for OM and 7/15/24 for a CPE    Future Appointments   Date Time Provider Department Center   5/10/2024  2:00 PM Erick Mast PT [facility] [place]   5/17/2024  1:15 PM Erick Mast PT [facility] [place]   5/20/2024  2:00 PM Erick Mast PT [facility] [place]   5/24/2024  1:15 PM Erick Mast PT [facility] [place]   5/30/2024  1:30 PM Erick Mast PT [facility] [place]   5/30/2024  3:00 PM Maria L Erickson, DO [facility] [place]   7/15/2024  2:00 PM Maria L Erickson, DO [facility] [place]

## 2024-04-26 NOTE — TELEPHONE ENCOUNTER
Kindly call and schedule   Thank you       LOV 4/16/24    -Return in about 6 weeks (around 5/28/2024) for OM -15  and 3 months for med check.

## 2024-04-26 NOTE — TELEPHONE ENCOUNTER
From: Deonna Vela  To: Maria L Erickson  Sent: 4/25/2024 6:41 PM CDT  Subject: Phentermine    Dr. Armendariz gave ok for phentermine.   Can I get the prescription for that please.  Thank you

## 2024-04-26 NOTE — TELEPHONE ENCOUNTER
Pt called to request refill of Repatha [facility] to [facility], [facility] does not stock it.

## 2024-05-01 ENCOUNTER — TELEPHONE (OUTPATIENT)
Dept: FAMILY MEDICINE CLINIC | Facility: CLINIC | Age: 64
End: 2024-05-01

## 2024-05-05 DIAGNOSIS — E78.5 DYSLIPIDEMIA: Primary | ICD-10-CM

## 2024-05-06 ENCOUNTER — TELEPHONE (OUTPATIENT)
Dept: PHYSICAL THERAPY | Facility: HOSPITAL | Age: 64
End: 2024-05-06

## 2024-05-07 ENCOUNTER — OFFICE VISIT (OUTPATIENT)
Dept: PHYSICAL THERAPY | Age: 64
End: 2024-05-07
Attending: FAMILY MEDICINE
Payer: COMMERCIAL

## 2024-05-07 DIAGNOSIS — M25.552 LEFT HIP PAIN: ICD-10-CM

## 2024-05-07 DIAGNOSIS — M16.12 PRIMARY OSTEOARTHRITIS OF LEFT HIP: Primary | ICD-10-CM

## 2024-05-07 PROCEDURE — 97161 PT EVAL LOW COMPLEX 20 MIN: CPT

## 2024-05-07 PROCEDURE — 97110 THERAPEUTIC EXERCISES: CPT

## 2024-05-07 PROCEDURE — 97140 MANUAL THERAPY 1/> REGIONS: CPT

## 2024-05-07 NOTE — PROGRESS NOTES
LOWER EXTREMITY EVALUATION:     Diagnosis:   Primary osteoarthritis of left hip (M16.12)  Left hip pain (M25.552)      Referring Provider: Maria L Erickson  Date of Evaluation:    5/7/2024    Precautions:  just had cataracts surgery 5/1 - unable to lie prone, lift heavy or valsalva's maneuver  Next MD visit:   none scheduled  Date of Surgery: n/a     PATIENT SUMMARY   Deonna Vela is a 63 year old female who presents to therapy today with complaints of L hip pain. She mentions doing well after her first round of therapy. She decided to get an injection in January as the pain was slightly starting to return. After this, pain got worse and worse. She did drive to [place] and felt this also made her hip worse. She took a break from doing much which made it feel better. Her and her  bought a house in [place] and needed to get the house ready. She also had to take another long car ride for a wake. She went back to the orthopod in [place] and they took more X-rays which showed a progression of the hip. She has been going to the weight loss clinic but it is difficult because she has a lot of pain in the hip. Feels pain in the leg and down to the ankle so MD thought the pain may be coming from her back as compensation. Mentions that it feels like her leg is going to give out.   Pt describes pain level current 7/10  Current functional limitations include walking for any distance, going up stairs, crossing legs, putting on socks and shoes, lower body dressing .     Deonna describes prior level of function as limited due to the hip pain but better after her first round of rehab. Pt goals include to hike in [place], lose weight to have the L TERE which she is thinking about doing in [place] next winter.  Past medical history was reviewed with Deonna. Significant findings include   Past Medical History:    Abdominal pain    lower left    Anxiety    Bloating    Constipation    Dyspareunia    Fibroids    Frequent urination    OAB    Apolinar    Heartburn    carlton/caffeine triggered    Hemorrhoids    High cholesterol    History of depression    Irregular bowel habits    Leaking of urine    Mixed hyperlipidemia    Neoplasm    vaginal wall    Noncompliance    Obesity    Pain in joints    left knee    PMDD (premenstrual dysphoric disorder)    PONV (postoperative nausea and vomiting)    Visual impairment    glasses prn    Vitamin D deficiency    Wears glasses    cataract surgery right eye 2019       Past Surgical History:   Procedure Laterality Date    Breast biopsy      total of 5-6    Cataract extraction w/  intraocular lens implant Right 10/2019    Dr. Cerna    Colonoscopy N/A 07/17/2020    repeat 5 yrs, tubular adenoma, Aubree Brown, DO;  Location:  ENDOSCOPY    Endometrial ablation  2005/6 & ~2014    2/2 DUB    [facility] localization wire 1 site left (cpt=19281) Left 2010    2 at same time, both benign.    Needle biopsy left Left 2016    ultra sound guided bx, lipoma.    Needle biopsy right Right 11/2016    ultra sound guided bx, lipoma.    Other surgical history  01/2023    benign parotid gland nodule removal    Tonsillectomy  1967       Recent Imaging:  XR HIP W OR WO PELVIS 2 OR 3 VIEWS, LEFT (CPT=73502)    Result Date: 4/22/2024  CONCLUSION:  No evidence of acute displaced fracture or dislocation in the left hip.  Degenerative changes as above.  LOCATION:  [id number]   Dictated by (CST): Aki Hernandez MD on 4/22/2024 at 3:09 PM     Finalized by (CST): Keenan Landrum MD on 4/22/2024 at 3:28 PM          ASSESSMENT  Deonna presents to physical therapy evaluation with primary c/o L hip and groin pain. The results of the objective tests and measures show poor posture with reduced WB through leg, gait abnormalities, muscle tension and shortening, and hip ROM limitations.  Functional deficits include but are not limited to walking for any distance, going up stairs, crossing legs, putting on socks and shoes, lower body dressing.  Signs and symptoms are  consistent with diagnosis of L hip OA. Pt and PT discussed evaluation findings, pathology, POC and HEP.  Pt voiced understanding and performs HEP correctly without reported pain. Skilled Physical Therapy is medically necessary to address the above impairments and reach functional goals.     OBJECTIVE:   Observation: reduced LLE WB in standing  Palpation: generalized TTP through adductors, quad, TLF, glutes   Sensation: -    PROM: (* denotes performed with pain)  Hip   Flexion: 100*  Extension: L NT   Abduction: L 40*  ER: L 60 deg*  IR: L 25 deg*     Accessory motion: +relief with hip distraction    Flexibility:  Hip Flexor: L limited  Piriformis: L limited  Quads: L limited    Strength/MMT: (* denotes performed with pain) held due to contraindication to exert force after cataract surgery 6 days ago; will assess at a later date    Special tests: none    Gait: pt ambulates on level ground with SP cane, leg kept in OPP by weight shift through hips/trunk   Balance: SLS: R NT sec, L NT sec    Today’s Treatment and Response:   Pt education was provided on exam findings, treatment diagnosis, treatment plan, expectations, and prognosis. Pt was also provided recommendations for activity modifications, possible soreness after evaluation, and modalities as needed [ice/heat].  Patient was instructed in and issued a HEP for:   Access Code: [email or web address]  URL: [email or web address]  Date: 05/07/2024  Prepared by: Brigitte Mena    Exercises  - Hooklying Isometric Hip Abduction with Belt  - 2 x daily - 7 x weekly - 20 reps - 5\" hold  - Supine Hip Adduction Isometric with Ball  - 2 x daily - 7 x weekly - 20 reps - 5\" hold  - Supine Lower Trunk Rotation  - 2 x daily - 7 x weekly - 20 reps  - Hooklying Gluteal Sets  - 2 x daily - 7 x weekly - 20 reps - 5\" hold    Charges: PT Eval Low Complexity, 20      Total Timed Treatment: 20 min     Total Treatment Time: 40 min   10 min STM quad, adductors, glute, TFL  10 min HEP  demo and perform     PLAN OF CARE:    Goals: (to be met in 10 visits)  Pt will have improved hip PROM Flex to 110 deg and ABD to 50 deg to be able to don/doff shoes and perform car transfers without difficulty   Pt will improve hip ABD and ER strength to at least 4/5 to increase ease with standing and walking   Pt will be able to squat to  light objects around the house with <3/10 hip pain   Pt will improve functional hip strength to report ability to ascend/descend 1 flight of stairs reciprocally without use of handrail   Pt will demonstrate improved SLS to >10 seconds MARTIN to promote safety and decrease risk of falls on uneven surfaces such as grass and gravel   Pt will be independent and compliant with comprehensive HEP to maintain progress achieved in PT       Frequency / Duration: Patient will be seen for 2 x/week or a total of 10 visits over a 90 day period. Treatment will include: Gait training, Manual Therapy, Neuromuscular Re-education, Therapeutic Activities, Therapeutic Exercise, Home Exercise Program instruction, and Modalities to include: Electrical stimulation (unattended)    Education or treatment limitation: None  Rehab Potential:fair    LEFS Score  LEFS Score: 57.5 % (10/13/2023  4:55 PM)      Patient/Family/Caregiver was advised of these findings, precautions, and treatment options and has agreed to actively participate in planning and for this course of care.    Thank you for your referral. Please co-sign or sign and return this letter via fax as soon as possible to [phone]. If you have any questions, please contact me at Dept: [phone]    Sincerely,  Electronically signed by therapist: Brigitte Mena PT  Physician's certification required: Yes  I certify the need for these services furnished under this plan of treatment and while under my care.    X___________________________________________________ Date____________________    Certification From: 5/7/2024  To:8/5/2024

## 2024-05-08 ENCOUNTER — TELEPHONE (OUTPATIENT)
Dept: PHYSICAL THERAPY | Age: 64
End: 2024-05-08

## 2024-05-08 RX ORDER — EVOLOCUMAB 140 MG/ML
1 INJECTION, SOLUTION SUBCUTANEOUS
Qty: 2.1 ML | Refills: 1 | Status: SHIPPED | OUTPATIENT
Start: 2024-05-08

## 2024-05-10 ENCOUNTER — APPOINTMENT (OUTPATIENT)
Dept: PHYSICAL THERAPY | Age: 64
End: 2024-05-10
Attending: FAMILY MEDICINE
Payer: COMMERCIAL

## 2024-05-17 ENCOUNTER — OFFICE VISIT (OUTPATIENT)
Dept: PHYSICAL THERAPY | Age: 64
End: 2024-05-17
Attending: FAMILY MEDICINE
Payer: COMMERCIAL

## 2024-05-17 PROCEDURE — 97110 THERAPEUTIC EXERCISES: CPT

## 2024-05-17 PROCEDURE — 97140 MANUAL THERAPY 1/> REGIONS: CPT

## 2024-05-20 ENCOUNTER — TELEPHONE (OUTPATIENT)
Dept: ORTHOPEDICS CLINIC | Facility: CLINIC | Age: 64
End: 2024-05-20

## 2024-05-20 ENCOUNTER — TELEPHONE (OUTPATIENT)
Dept: PHYSICAL THERAPY | Facility: HOSPITAL | Age: 64
End: 2024-05-20

## 2024-05-20 ENCOUNTER — APPOINTMENT (OUTPATIENT)
Dept: PHYSICAL THERAPY | Age: 64
End: 2024-05-20
Attending: FAMILY MEDICINE
Payer: COMMERCIAL

## 2024-05-20 NOTE — TELEPHONE ENCOUNTER
NSAID/LOPEZ-2 inhibitor Use in Cardiovascular disease (CVD)    Our records indicate your patient is receiving CELECOXIB and VALSARTAN.  Clinical evidence has shown that there si a drug-disease state interaction between anti-inflammatory drugs and cardiovascular disease.  Nonsteroidal anti-inflammatory drugs (NSAIDs) and cyclooxygenase (LOPEZ)-2 inhibitors are associated with adverse cardiovascular outcomes and should be avoided if possible in patients with CVD or at high risk for CVD (hypertension, myocardial infarction, coronary artery disease, heart failure, diabetes and CKD).  If used, the shortest duration and lowest doses should be chosen.     Provider response form filled out stating  - patient is under my care  - Continue current therapy with close monitoring.  This regimen is appropriate for my patients.   - short term use

## 2024-05-24 ENCOUNTER — APPOINTMENT (OUTPATIENT)
Dept: PHYSICAL THERAPY | Age: 64
End: 2024-05-24
Attending: FAMILY MEDICINE
Payer: COMMERCIAL

## 2024-05-30 ENCOUNTER — OFFICE VISIT (OUTPATIENT)
Dept: PHYSICAL THERAPY | Age: 64
End: 2024-05-30
Attending: FAMILY MEDICINE
Payer: COMMERCIAL

## 2024-05-30 PROCEDURE — 97140 MANUAL THERAPY 1/> REGIONS: CPT

## 2024-05-30 PROCEDURE — 97110 THERAPEUTIC EXERCISES: CPT

## 2024-05-30 NOTE — PROGRESS NOTES
Diagnosis:   - Primary osteoarthritis of left hip (M16.12)  - Left hip pain (M25.552)        Referring Provider: Maria L Erickson  Date of Evaluation:    5/7/2024    Precautions:    just had cataracts surgery 5/1 - unable to lie prone, lift heavy or valsalva's maneuver  Next MD visit:   none scheduled  Date of Surgery:   n/a   Insurance Primary/Secondary: [employer] P[place] / N/A     # Auth Visits: no auth            Subjective: pt arrives to PT today and states she is not having as much pain in the lower leg.  She states she notices pain w/ bending knee to chest up to about 90 degrees (e.g. with sit to stand transfer/transition).      Pain:   2/10 at rest  3-4/10 with activity      Objective:   TTP L TFL and proximal ITB      Past session(s) objectives:  Observation: reduced LLE WB in standing  Palpation: generalized TTP through adductors, quad, TLF, glutes; L peroneals TTP  Sensation: -    PROM: (* denotes performed with pain)  Hip   Flexion: 100*  Extension: L NT   Abduction: L 40*  ER: L 60 deg*  IR: L 25 deg*     Accessory motion: +relief with hip distraction    Flexibility:  Hip Flexor: L limited  Piriformis: L limited  Quads: L limited    Strength/MMT: (* denotes performed with pain) held due to contraindication to exert force after cataract surgery 6 days ago; will assess at a later date    Special tests: none    Gait: pt ambulates on level ground with SP cane, leg kept in OPP by weight shift through hips/trunk   Balance: SLS: R NT sec, L NT sec    Assessment:   Pt tolerated PT tx well today.  Added hypervolt IASTM for improved soft tissue extensibility.  Progressed strengthening exercises as tolerated.  Pt continues to demo lateral trunk sway w/ ambulation w/o cane but improves w/ use of cane.  Encouraged pt to continue to continue to use straight cane for more smooth gait pattern.         Goals:   (to be met in 10 visits)  Pt will have improved hip PROM Flex to 110 deg and ABD to 50 deg to be able to don/doff shoes  and perform car transfers without difficulty   Pt will improve hip ABD and ER strength to at least 4/5 to increase ease with standing and walking   Pt will be able to squat to  light objects around the house with <3/10 hip pain   Pt will improve functional hip strength to report ability to ascend/descend 1 flight of stairs reciprocally without use of handrail   Pt will demonstrate improved SLS to >10 seconds MARTIN to promote safety and decrease risk of falls on uneven surfaces such as grass and gravel   Pt will be independent and compliant with comprehensive HEP to maintain progress achieved in PT     Plan: Next visit consider - dry needling to L hip  Date: 5/17/2024  TX#: 2/10 (poc) Date: 5/30/24                 TX#: 3/10 (poc) Date:                 TX#: 4/10 (poc) Date:                 TX#: 5/10 (poc) Date:   Tx#: 6/10 (poc)   Nustep - S10 - L1 - 5'  Bridges w/ BTB x20  BTB supine clams x20  Light pilates ring adduction in hooklying x20  L standing march x20 @ bar  L standing hip abd @ bar 3x10  @ bar partial squats x10 Nustep - S10 - L1 - 5'  L standing march 2x10 @ bar 1#  L standing hip abd @ bar 2x10 1#  L FSU 4\" - R UE assist on rail x15  YSB supine HS rolls x20  YSB partial HS bridge x20                          Manual: 15'  - Lateral hip distraction w/ mulligan belt   - Long axis traction Manual: 15'  - Hypervolt IASTM - L hip flexor, TFL, proximal ITB, lateral glute - supine  - Long axis traction      HEP:   Access Code: [email or web address]  URL: [email or web address]  Date: 05/07/2024  Prepared by: Brigitte Mena    Exercises  - Hooklying Isometric Hip Abduction with Belt  - 2 x daily - 7 x weekly - 20 reps - 5\" hold  - Supine Hip Adduction Isometric with Ball  - 2 x daily - 7 x weekly - 20 reps - 5\" hold  - Supine Lower Trunk Rotation  - 2 x daily - 7 x weekly - 20 reps  - Hooklying Gluteal Sets  - 2 x daily - 7 x weekly - 20 reps - 5\" hold    Charges: 1 manual, 2 therex       Total Timed  Treatment: 40 min  Total Treatment Time: 40 min

## 2024-06-03 ENCOUNTER — OFFICE VISIT (OUTPATIENT)
Dept: PHYSICAL THERAPY | Age: 64
End: 2024-06-03
Attending: FAMILY MEDICINE
Payer: COMMERCIAL

## 2024-06-03 PROCEDURE — 97140 MANUAL THERAPY 1/> REGIONS: CPT

## 2024-06-03 PROCEDURE — 97110 THERAPEUTIC EXERCISES: CPT

## 2024-06-03 NOTE — PROGRESS NOTES
Diagnosis:   - Primary osteoarthritis of left hip (M16.12)  - Left hip pain (M25.552)        Referring Provider: Maria L Erickson  Date of Evaluation:    5/7/2024    Precautions:    just had cataracts surgery 5/1 - unable to lie prone, lift heavy or valsalva's maneuver  Next MD visit:   none scheduled  Date of Surgery:   n/a   Insurance Primary/Secondary: [employer] / N/A     # Auth Visits: no auth            Subjective: pt states that she still has pain in the leg. Wants to walk.     Pain:   2/10 at rest  3-4/10 with activity      Objective:   TTP L TFL and proximal ITB    Assessment:   Due to patient preference, elected to use Hypervolt instead of TPDN this session for soft tissue work. She continues to have difficulty weightbearing through LLE independently. Complaints of popping in the hip occasionally with exercises.    Goals:   (to be met in 10 visits)  Pt will have improved hip PROM Flex to 110 deg and ABD to 50 deg to be able to don/doff shoes and perform car transfers without difficulty   Pt will improve hip ABD and ER strength to at least 4/5 to increase ease with standing and walking   Pt will be able to squat to  light objects around the house with <3/10 hip pain   Pt will improve functional hip strength to report ability to ascend/descend 1 flight of stairs reciprocally without use of handrail   Pt will demonstrate improved SLS to >10 seconds MARTIN to promote safety and decrease risk of falls on uneven surfaces such as grass and gravel   Pt will be independent and compliant with comprehensive HEP to maintain progress achieved in PT     Plan: Next visit consider - dry needling to L hip?  Date: 5/17/2024  TX#: 2/10 (poc) Date: 5/30/24                 TX#: 3/10 (poc) Date: 6/3/2024                TX#: 4/10 (poc) Date:                 TX#: 5/10 (poc) Date:   Tx#: 6/10 (poc)   Nustep - S10 - L1 - 5'  Bridges w/ BTB x20  BTB supine clams x20  Light pilates ring adduction in hooklying x20  L standing march  x20 @ bar  L standing hip abd @ bar 3x10  @ bar partial squats x10 Nustep - S10 - L1 - 5'  L standing march 2x10 @ bar 1#  L standing hip abd @ bar 2x10 1#  L FSU 4\" - R UE assist on rail x15  YSB supine HS rolls x20  YSB partial HS bridge x20       Ther-Ex: 25 min  NuStep lvl 3 7 min  Fig 4 LTR, 1x15 B  DKTC on SB, 1x20   Clams 2x10  Standing hip abd, 2#, 2x10  Standing hip ext, 2#, 2x10  Standing march, 2#, 2x10       Manual: 15'  - Lateral hip distraction w/ mulligan belt   - Long axis traction Manual: 15'  - Hypervolt IASTM - L hip flexor, TFL, proximal ITB, lateral glute - supine  - Long axis traction Manual: 20'  - Hypervolt IASTM - L hip flexor, TFL, proximal ITB, lateral glute - supine  - Long axis traction     HEP:   Access Code: [email or web address]  URL: [email or web address]  Date: 05/07/2024  Prepared by: Brigitte Mena    Exercises  - Hooklying Isometric Hip Abduction with Belt  - 2 x daily - 7 x weekly - 20 reps - 5\" hold  - Supine Hip Adduction Isometric with Ball  - 2 x daily - 7 x weekly - 20 reps - 5\" hold  - Supine Lower Trunk Rotation  - 2 x daily - 7 x weekly - 20 reps  - Hooklying Gluteal Sets  - 2 x daily - 7 x weekly - 20 reps - 5\" hold    Charges: 1 manual, 2 therex         Total Timed Treatment: 45 min  Total Treatment Time: 45 min

## 2024-06-06 ENCOUNTER — OFFICE VISIT (OUTPATIENT)
Dept: PHYSICAL THERAPY | Age: 64
End: 2024-06-06
Attending: FAMILY MEDICINE
Payer: COMMERCIAL

## 2024-06-06 PROCEDURE — 97110 THERAPEUTIC EXERCISES: CPT

## 2024-06-06 PROCEDURE — 97140 MANUAL THERAPY 1/> REGIONS: CPT

## 2024-06-06 NOTE — PROGRESS NOTES
Diagnosis:   - Primary osteoarthritis of left hip (M16.12)  - Left hip pain (M25.552)        Referring Provider: Maria L Erickson  Date of Evaluation:    5/7/2024    Precautions:    just had cataracts surgery 5/1 - unable to lie prone, lift heavy or valsalva's maneuver  Next MD visit:   none scheduled  Date of Surgery:   n/a   Insurance Primary/Secondary: [employer] / N/A     # Auth Visits: no auth            Subjective: pt arrives to PT today and states that her leg did not like the side to side step up last session.  She states her hip feels stiff today.  She states stairs and car transfer from passenger's side continue to hurt.     Pain:   4/10 - hip stiffness   2/10 - hip pain      Objective:   TTP L glute med & piriformis      Assessment:   Pt tolerated PT tx well today.  Modified body weight squats by having pt flare toes to better align with hip socket.  Added out flaring of toes with standing hip flexor stretch to bias adductors.        Goals:   (to be met in 10 visits)  Pt will have improved hip PROM Flex to 110 deg and ABD to 50 deg to be able to don/doff shoes and perform car transfers without difficulty   Pt will improve hip ABD and ER strength to at least 4/5 to increase ease with standing and walking   Pt will be able to squat to  light objects around the house with <3/10 hip pain   Pt will improve functional hip strength to report ability to ascend/descend 1 flight of stairs reciprocally without use of handrail   Pt will demonstrate improved SLS to >10 seconds MARTIN to promote safety and decrease risk of falls on uneven surfaces such as grass and gravel   Pt will be independent and compliant with comprehensive HEP to maintain progress achieved in PT     Plan: Next visit consider - dry needling to L hip?  Date: 5/17/2024  TX#: 2/10 (poc) Date: 5/30/24                 TX#: 3/10 (poc) Date: 6/3/2024                TX#: 4/10 (poc) Date:   6/6/24              TX#: 5/10 (poc) Date:   Tx#: 6/10 (poc)    Nustep - S10 - L1 - 5'  Bridges w/ BTB x20  BTB supine clams x20  Light pilates ring adduction in hooklying x20  L standing march x20 @ bar  L standing hip abd @ bar 3x10  @ bar partial squats x10 Nustep - S10 - L1 - 5'  L standing march 2x10 @ bar 1#  L standing hip abd @ bar 2x10 1#  L FSU 4\" - R UE assist on rail x15  YSB supine HS rolls x20  YSB partial HS bridge x20       Ther-Ex: 25 min  NuStep lvl 3 7 min  Fig 4 LTR, 1x15 B  DKTC on SB, 1x20   Clams 2x10  Standing hip abd, 2#, 2x10  Standing hip ext, 2#, 2x10  Standing march, 2#, 2x10   Ther-Ex: 23 min  NuStep lvl 3 7 min  L clams x25  L modified storybooks 2x15  Mini squats w/ bilateral toe flare x20  R/L tandem balance 3x15\"  L standing hip flexor stretch w/ foot 45 deg out toeing 3x20\"      Manual: 15'  - Lateral hip distraction w/ mulligan belt   - Long axis traction Manual: 15'  - Hypervolt IASTM - L hip flexor, TFL, proximal ITB, lateral glute - supine  - Long axis traction Manual: 20'  - Hypervolt IASTM - L hip flexor, TFL, proximal ITB, lateral glute - supine  - Long axis traction Manual: 17'  - Hypervolt IASTM - L hip flexor, TFL, proximal ITB, lateral glute - sidelying and supine    HEP:   Access Code: [email or web address]  URL: [email or web address]  Date: 05/07/2024  Prepared by: Brigitte Mena    Exercises  - Hooklying Isometric Hip Abduction with Belt  - 2 x daily - 7 x weekly - 20 reps - 5\" hold  - Supine Hip Adduction Isometric with Ball  - 2 x daily - 7 x weekly - 20 reps - 5\" hold  - Supine Lower Trunk Rotation  - 2 x daily - 7 x weekly - 20 reps  - Hooklying Gluteal Sets  - 2 x daily - 7 x weekly - 20 reps - 5\" hold    Charges: 1 manual, 2 therex         Total Timed Treatment: 40 min  Total Treatment Time: 43 min

## 2024-06-11 ENCOUNTER — OFFICE VISIT (OUTPATIENT)
Dept: PHYSICAL THERAPY | Age: 64
End: 2024-06-11
Attending: FAMILY MEDICINE
Payer: COMMERCIAL

## 2024-06-11 PROCEDURE — 97140 MANUAL THERAPY 1/> REGIONS: CPT

## 2024-06-11 PROCEDURE — 97110 THERAPEUTIC EXERCISES: CPT

## 2024-06-11 NOTE — PROGRESS NOTES
Diagnosis:   - Primary osteoarthritis of left hip (M16.12)  - Left hip pain (M25.552)        Referring Provider: Maria L Erickson  Date of Evaluation:    5/7/2024    Precautions:    just had cataracts surgery 5/1 - unable to lie prone, lift heavy or valsalva's maneuver  Next MD visit:   none scheduled  Date of Surgery:   n/a   Insurance Primary/Secondary: [employer] / N/A     # Auth Visits: no auth            Subjective: pt states that she feels like her symptoms are getting no better, no worse. Continues to have pain down the left leg. They are leaving for [place] at the end of the week so Thursday will be her last day. Looking for updated HEP    Pain:   4/10 - hip stiffness   2/10 - hip pain      Objective:   TTP L glute med & piriformis persists      Assessment:   Pt continued hip/LE strength and stability in standing to improve joint alignment for reduced compressive forces during upright activities. She tolerated this well for the most part but continues to have complaints of popping in the hip.       Goals:   (to be met in 10 visits)  Pt will have improved hip PROM Flex to 110 deg and ABD to 50 deg to be able to don/doff shoes and perform car transfers without difficulty   Pt will improve hip ABD and ER strength to at least 4/5 to increase ease with standing and walking   Pt will be able to squat to  light objects around the house with <3/10 hip pain   Pt will improve functional hip strength to report ability to ascend/descend 1 flight of stairs reciprocally without use of handrail   Pt will demonstrate improved SLS to >10 seconds MARTIN to promote safety and decrease risk of falls on uneven surfaces such as grass and gravel   Pt will be independent and compliant with comprehensive HEP to maintain progress achieved in PT     Plan: DC with updated HEP   Date: 5/17/2024  TX#: 2/10 (poc) Date: 5/30/24                 TX#: 3/10 (poc) Date: 6/3/2024                TX#: 4/10 (poc) Date:   6/6/24              TX#:  5/10 (poc) Date: 6/11/2024  Tx#: 6/10 (poc)   Nustep - S10 - L1 - 5'  Bridges w/ BTB x20  BTB supine clams x20  Light pilates ring adduction in hooklying x20  L standing march x20 @ bar  L standing hip abd @ bar 3x10  @ bar partial squats x10 Nustep - S10 - L1 - 5'  L standing march 2x10 @ bar 1#  L standing hip abd @ bar 2x10 1#  L FSU 4\" - R UE assist on rail x15  YSB supine HS rolls x20  YSB partial HS bridge x20       Ther-Ex: 25 min  NuStep lvl 3 7 min  Fig 4 LTR, 1x15 B  DKTC on SB, 1x20   Clams 2x10  Standing hip abd, 2#, 2x10  Standing hip ext, 2#, 2x10  Standing march, 2#, 2x10   Ther-Ex: 23 min  NuStep lvl 3 7 min  L clams x25  L modified storybooks 2x15  Mini squats w/ bilateral toe flare x20  R/L tandem balance 3x15\"  L standing hip flexor stretch w/ foot 45 deg out toeing 3x20\"   Ther-Ex: 25 min  LBE, 5 min  L clams 3x10  DKTC on SB, 1x20  Partial bridge on RSB straight leg, 2x10  Bridge with add squeeze, 2x10   R/L tandem balance 3x20\"  L standing hip flexor stretch w/ foot 45 deg out toeing 3x20\"   Manual: 15'  - Lateral hip distraction w/ mulligan belt   - Long axis traction Manual: 15'  - Hypervolt IASTM - L hip flexor, TFL, proximal ITB, lateral glute - supine  - Long axis traction Manual: 20'  - Hypervolt IASTM - L hip flexor, TFL, proximal ITB, lateral glute - supine  - Long axis traction Manual: 17'  - Hypervolt IASTM - L hip flexor, TFL, proximal ITB, lateral glute - sidelying and supine Manual: 20'  - Hypervolt IASTM - L hip flexor, TFL, proximal ITB, lateral glute - sidelying and supine   HEP:   Access Code: [email or web address]  URL: [email or web address]  Date: 05/07/2024  Prepared by: Brigitte Rajesh    Exercises  - Hooklying Isometric Hip Abduction with Belt  - 2 x daily - 7 x weekly - 20 reps - 5\" hold  - Supine Hip Adduction Isometric with Ball  - 2 x daily - 7 x weekly - 20 reps - 5\" hold  - Supine Lower Trunk Rotation  - 2 x daily - 7 x weekly - 20 reps  - Hooklying Gluteal Sets   - 2 x daily - 7 x weekly - 20 reps - 5\" hold    Charges: 1 manual, 2 therex         Total Timed Treatment: 45 min  Total Treatment Time: 45 min

## 2024-06-13 ENCOUNTER — OFFICE VISIT (OUTPATIENT)
Dept: PHYSICAL THERAPY | Age: 64
End: 2024-06-13
Attending: FAMILY MEDICINE
Payer: COMMERCIAL

## 2024-06-13 PROCEDURE — 97140 MANUAL THERAPY 1/> REGIONS: CPT

## 2024-06-13 PROCEDURE — 97110 THERAPEUTIC EXERCISES: CPT

## 2024-06-13 NOTE — PROGRESS NOTES
Discharge Summary  Pt has attended 6 visits in Physical Therapy.     Diagnosis:   - Primary osteoarthritis of left hip (M16.12)  - Left hip pain (M25.552)        Referring Provider: Maria L Erickson  Date of Evaluation:    5/7/2024    Precautions:    just had cataracts surgery 5/1 - unable to lie prone, lift heavy or valsalva's maneuver  Next MD visit:   none scheduled  Date of Surgery:   n/a   Insurance Primary/Secondary: [employer] P[place] / N/A     # Auth Visits: no auth            Subjective: Overall, pt feels like her pain is stable and neither worsening nor improving. She is leaving for [place] to spend the summer at her second home and therefore today will be her last day in therapy. Minimal functional improvements; still using the cane to walk. She does feel like her leg is stronger but doesn't feel the pain has changed at all.   No longer needs cane to go up stairs, still needs one rail.   Change from Maloxicam to Celebrex.     Pain:   4/10 - hip stiffness   2/10 - hip pain      Objective:   Objective measures in bold are today's values  * = pain  All ROM measures are listed in degrees unless otherwise noted    Observation: reduced LLE WB in standing; no change  Palpation: generalized TTP through adductors, quad, TLF, glutes; min change     PROM: (* denotes performed with pain)  Hip   Flexion: 100*; <105*  Extension: L NT   Abduction: L 40*; 40*  ER: L 60 deg*; 70*  IR: L 25 deg*; 35*      Accessory motion: +relief with hip distraction; continued     Flexibility:  Hip Flexor: L limited; continued  Piriformis: L limited; continued to a lesser degree  Quads: L limited; continued     Strength/MMT: (* denotes performed with pain) held due to contraindication to exert force after cataract surgery 6 days ago; will assess at a later date;   Weakness in IR, abd and flexion persist on L      Gait: pt ambulates on level ground with SP cane, leg kept in OPP by weight shift through hips/trunk ; trendelenberg lean, reduced LLE  WB   Balance: SLS: R NT sec, L NT sec; R 10 seconds, L <3 seconds       Assessment: Deonna Vela has made objective improvements in hip ROM, muscle tension, strength, balance and gait to a small degree. She still has limitations in achieving full ROM and strength due to continued pain which is also limiting her functional improvements. She was given an updated HEP to continue strengthening before her surgery in November.     Goals:   (to be met in 10 visits)  Pt will have improved hip PROM Flex to 110 deg and ABD to 50 deg to be able to don/doff shoes and perform car transfers without difficulty Not met   Pt will improve hip ABD and ER strength to at least 4/5 to increase ease with standing and walking Not met but progressed  Pt will be able to squat to  light objects around the house with <3/10 hip pain Not met   Pt will improve functional hip strength to report ability to ascend/descend 1 flight of stairs reciprocally without use of handrail Not met but progressed (still needs HRA)   Pt will demonstrate improved SLS to >10 seconds MARTIN to promote safety and decrease risk of falls on uneven surfaces such as grass and gravel Not met but progressed  Pt will be independent and compliant with comprehensive HEP to maintain progress achieved in PT Met      Post LEFS Score  Post LEFS Score: 51.25 % (6/13/2024  5:40 PM)    -3.75 % improvement    Plan: DC with updated HEP     Patient/Family/Caregiver was advised of these findings, precautions, and treatment options and has agreed to actively participate in planning and for this course of care.    Thank you for your referral. If you have any questions, please contact me at Dept: [phone].    Sincerely,  Electronically signed by therapist: Brigitte Mena, RENITA     Physician's certification required:  No  Please co-sign or sign and return this letter via fax as soon as possible to [phone].   I certify the need for these services furnished under this plan of  treatment and while under my care.    X___________________________________________________ Date____________________    Certification From: 6/13/2024  To:9/11/2024     Date:   6/6/24              TX#: 5/10 (poc) Date: 6/11/2024  Tx#: 6/10 (poc) Date: 6/13/2024  Tx#: 7/10   Ther-Ex: 23 min  NuStep lvl 3 7 min  L clams x25  L modified storybooks 2x15  Mini squats w/ bilateral toe flare x20  R/L tandem balance 3x15\"  L standing hip flexor stretch w/ foot 45 deg out toeing 3x20\"   Ther-Ex: 25 min  LBE, 5 min  L clams 3x10  DKTC on SB, 1x20  Partial bridge on RSB straight leg, 2x10  Bridge with add squeeze, 2x10   R/L tandem balance 3x20\"  L standing hip flexor stretch w/ foot 45 deg out toeing 3x20\" Ther-Ex: 20 min  LBE, 5 min  L hip PROM  L hip distraction  Updated HEP and review.    Manual: 17'  - Hypervolt IASTM - L hip flexor, TFL, proximal ITB, lateral glute - sidelying and supine Manual: 20'  - Hypervolt IASTM - L hip flexor, TFL, proximal ITB, lateral glute - sidelying and supine Manual: 15'  - Hypervolt IASTM - L hip flexor, TFL, proximal ITB, lateral glute - sidelying and supine     Measurements 5 min   HEP:   Access Code: [email or web address]  URL: [email or web address]  Date: 06/13/2024  Prepared by: Brigitte Mena    Exercises  - Hooklying Isometric Hip Abduction with Belt  - 1 x daily - 4 x weekly - 30 reps - 5\" hold  - Supine Hip Adduction Isometric with Ball  - 1 x daily - 4 x weekly - 30 reps - 5\" hold  - Supine Lower Trunk Rotation  - 1 x daily - 4 x weekly - 30 reps  - Supine Bridge  - 1 x daily - 4 x weekly - 2-3 sets - 10 reps - 3\" hold  - Clamshell  - 1 x daily - 4 x weekly - 2-3 sets - 10 reps  - Standing Hip Abduction with Counter Support  - 1 x daily - 4 x weekly - 2-3 sets - 10 reps  - Standing Hip Extension with Counter Support  - 1 x daily - 4 x weekly - 2-3 sets - 10 reps  - Standing March with Counter Support  - 1 x daily - 4 x weekly - 2-3 sets - 10 reps  - Mini Squat with Counter  Support  - 1 x daily - 4 x weekly - 2-3 sets - 10 reps  - Side Stepping with Counter Support  - 1 x daily - 4 x weekly - 3 laps  - Tandem Stance  - 1 x daily - 4 x weekly - 3 sets - 30\"  hold  - Seated Hamstring Stretch  - 1 x daily - 7 x weekly - 3 sets - 30\" hold  - Standing Hip Flexor Stretch  - 1 x daily - 7 x weekly - 3 sets - 30\"  hold    Charges: 1 manual, 1 therex         Total Timed Treatment: 40 min  Total Treatment Time: 40 min

## 2024-06-19 ENCOUNTER — APPOINTMENT (OUTPATIENT)
Dept: PHYSICAL THERAPY | Age: 64
End: 2024-06-19
Attending: FAMILY MEDICINE
Payer: COMMERCIAL

## 2024-07-15 ENCOUNTER — TELEPHONE (OUTPATIENT)
Dept: FAMILY MEDICINE CLINIC | Facility: CLINIC | Age: 64
End: 2024-07-15

## 2024-07-15 ENCOUNTER — OFFICE VISIT (OUTPATIENT)
Dept: FAMILY MEDICINE CLINIC | Facility: CLINIC | Age: 64
End: 2024-07-15
Payer: COMMERCIAL

## 2024-07-15 ENCOUNTER — LAB ENCOUNTER (OUTPATIENT)
Dept: LAB | Age: 64
End: 2024-07-15
Attending: FAMILY MEDICINE
Payer: COMMERCIAL

## 2024-07-15 VITALS
DIASTOLIC BLOOD PRESSURE: 74 MMHG | RESPIRATION RATE: 18 BRPM | OXYGEN SATURATION: 97 % | HEART RATE: 73 BPM | HEIGHT: 66 IN | BODY MASS INDEX: 47.09 KG/M2 | SYSTOLIC BLOOD PRESSURE: 132 MMHG | WEIGHT: 293 LBS

## 2024-07-15 DIAGNOSIS — Z12.31 SCREENING MAMMOGRAM FOR BREAST CANCER: ICD-10-CM

## 2024-07-15 DIAGNOSIS — R73.03 PREDIABETES: ICD-10-CM

## 2024-07-15 DIAGNOSIS — Z79.899 MEDICATION MANAGEMENT: ICD-10-CM

## 2024-07-15 DIAGNOSIS — E78.5 DYSLIPIDEMIA: Primary | ICD-10-CM

## 2024-07-15 DIAGNOSIS — Z00.00 ROUTINE GENERAL MEDICAL EXAMINATION AT A HEALTH CARE FACILITY: Primary | ICD-10-CM

## 2024-07-15 DIAGNOSIS — E78.5 DYSLIPIDEMIA: ICD-10-CM

## 2024-07-15 DIAGNOSIS — E78.2 MIXED HYPERLIPIDEMIA: ICD-10-CM

## 2024-07-15 DIAGNOSIS — Z12.4 SCREENING FOR MALIGNANT NEOPLASM OF CERVIX: ICD-10-CM

## 2024-07-15 LAB
ALBUMIN SERPL-MCNC: 3.5 G/DL (ref 3.4–5)
ALBUMIN/GLOB SERPL: 0.9 {RATIO} (ref 1–2)
ALP LIVER SERPL-CCNC: 97 U/L
ALT SERPL-CCNC: 28 U/L
ANION GAP SERPL CALC-SCNC: 8 MMOL/L (ref 0–18)
AST SERPL-CCNC: 15 U/L (ref 15–37)
BILIRUB SERPL-MCNC: 0.3 MG/DL (ref 0.1–2)
BUN BLD-MCNC: 24 MG/DL (ref 9–23)
CALCIUM BLD-MCNC: 9.6 MG/DL (ref 8.5–10.1)
CHLORIDE SERPL-SCNC: 108 MMOL/L (ref 98–112)
CHOLEST SERPL-MCNC: 272 MG/DL (ref ?–200)
CO2 SERPL-SCNC: 25 MMOL/L (ref 21–32)
CREAT BLD-MCNC: 1.09 MG/DL
EGFRCR SERPLBLD CKD-EPI 2021: 57 ML/MIN/1.73M2 (ref 60–?)
EST. AVERAGE GLUCOSE BLD GHB EST-MCNC: 123 MG/DL (ref 68–126)
FASTING PATIENT LIPID ANSWER: YES
FASTING STATUS PATIENT QL REPORTED: YES
GLOBULIN PLAS-MCNC: 4 G/DL (ref 2.8–4.4)
GLUCOSE BLD-MCNC: 121 MG/DL (ref 70–99)
HBA1C MFR BLD: 5.9 % (ref ?–5.7)
HDLC SERPL-MCNC: 44 MG/DL (ref 40–59)
LDLC SERPL CALC-MCNC: 176 MG/DL (ref ?–100)
NONHDLC SERPL-MCNC: 228 MG/DL (ref ?–130)
OSMOLALITY SERPL CALC.SUM OF ELEC: 297 MOSM/KG (ref 275–295)
POTASSIUM SERPL-SCNC: 4.2 MMOL/L (ref 3.5–5.1)
PROT SERPL-MCNC: 7.5 G/DL (ref 6.4–8.2)
SODIUM SERPL-SCNC: 141 MMOL/L (ref 136–145)
TRIGL SERPL-MCNC: 273 MG/DL (ref 30–149)
VLDLC SERPL CALC-MCNC: 56 MG/DL (ref 0–30)

## 2024-07-15 PROCEDURE — 80053 COMPREHEN METABOLIC PANEL: CPT

## 2024-07-15 PROCEDURE — 83036 HEMOGLOBIN GLYCOSYLATED A1C: CPT

## 2024-07-15 PROCEDURE — 87624 HPV HI-RISK TYP POOLED RSLT: CPT | Performed by: FAMILY MEDICINE

## 2024-07-15 PROCEDURE — 80061 LIPID PANEL: CPT

## 2024-07-15 PROCEDURE — 88175 CYTOPATH C/V AUTO FLUID REDO: CPT | Performed by: FAMILY MEDICINE

## 2024-07-15 RX ORDER — SERTRALINE HYDROCHLORIDE 100 MG/1
200 TABLET, FILM COATED ORAL DAILY
Qty: 180 TABLET | Refills: 1 | Status: SHIPPED | OUTPATIENT
Start: 2024-07-15

## 2024-07-15 RX ORDER — MELOXICAM 15 MG/1
15 TABLET ORAL DAILY
Qty: 90 TABLET | Refills: 0 | Status: SHIPPED | OUTPATIENT
Start: 2024-07-15 | End: 2024-07-15

## 2024-07-15 RX ORDER — EVOLOCUMAB 420 MG/3.5
420 KIT SUBCUTANEOUS
Qty: 3.6 ML | Refills: 1 | Status: SHIPPED | OUTPATIENT
Start: 2024-07-15 | End: 2024-08-14

## 2024-07-15 NOTE — H&P
CC: Annual Physical Exam    HPI:   Deonna Vela is a 63 year old female who presents for a complete physical exam. Symptoms: is menopausal. Patient complains of left hip pain for many years but worse lately.     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      Wt Readings from Last 6 Encounters:   07/15/24 (!) 308 lb 4 oz (139.8 kg)   04/22/24 (!) 301 lb 3.2 oz (136.6 kg)   04/16/24 300 lb (136.1 kg)   01/03/24 (!) 311 lb (141.1 kg)   11/29/23 (!) 308 lb (139.7 kg)   07/25/23 (!) 302 lb (137 kg)     Body mass index is 49.75 kg/m².     Results for orders placed or performed in visit on 07/15/24   Comp Metabolic Panel (14) [E]   Result Value Ref Range    Glucose 121 (H) 70 - 99 mg/dL    Sodium 141 136 - 145 mmol/L    Potassium 4.2 3.5 - 5.1 mmol/L    Chloride 108 98 - 112 mmol/L    CO2 25.0 21.0 - 32.0 mmol/L    Anion Gap 8 0 - 18 mmol/L    BUN 24 (H) 9 - 23 mg/dL    Creatinine 1.09 (H) 0.55 - 1.02 mg/dL    Calcium, Total 9.6 8.5 - 10.1 mg/dL    Calculated Osmolality 297 (H) 275 - 295 mOsm/kg    eGFR-Cr 57 (L) >=60 mL/min/1.73m2    AST 15 15 - 37 U/L    ALT 28 13 - 56 U/L    Alkaline Phosphatase 97 50 - 130 U/L    Bilirubin, Total 0.3 0.1 - 2.0 mg/dL    Total Protein 7.5 6.4 - 8.2 g/dL    Albumin 3.5 3.4 - 5.0 g/dL    Globulin  4.0 2.8 - 4.4 g/dL    A/G Ratio 0.9 (L) 1.0 - 2.0    Patient Fasting for CMP? Yes    Lipid Panel [E]   Result Value Ref Range    Cholesterol, Total 272 (H) <200 mg/dL    HDL Cholesterol 44 40 - 59 mg/dL    Triglycerides 273 (H) 30 - 149 mg/dL    LDL Cholesterol 176 (H) <100 mg/dL    VLDL 56 (H) 0 - 30 mg/dL    Non HDL Chol 228 (H) <130 mg/dL    Patient Fasting for Lipid? Yes    Hemoglobin A1C [E]   Result Value Ref Range    HgbA1C 5.9 (H) <5.7 %    Estimated Average Glucose 123 68 - 126 mg/dL       Current Outpatient Medications   Medication Sig Dispense Refill    Evolocumab with Infusor (REPATHA PUSHTRONEX SYSTEM) 420 MG/3.5ML Subcutaneous Solution Cartridge Inject 420 mg into the skin every 30  (thirty) days. 3.6 mL 1    sertraline 100 MG Oral Tab Take 2 tablets (200 mg total) by mouth daily. 180 tablet 1    azelastine 0.1 % Nasal Solution 2 sprays by Nasal route 2 (two) times daily. 30 mL 0    valsartan 160 MG Oral Tab Take 1 tablet (160 mg total) by mouth daily. 90 tablet 1    Omeprazole 40 MG Oral Capsule Delayed Release Take 1 capsule (40 mg total) by mouth in the morning and 1 capsule (40 mg total) before bedtime. 180 capsule 1    triamcinolone 0.1 % External Cream Apply 1 Application topically 2 (two) times daily. Apply to scar twice daily 45 g 0    Multiple Vitamins-Minerals (ONE DAILY CALCIUM/IRON) Oral Tab Take 1 tablet by mouth daily.      loratadine-pseudoephedrine ER  MG Oral Tablet 24 Hr Take 1 tablet by mouth daily. 30 tablet 1    fexofenadine 180 MG Oral Tab Take 1 tablet (180 mg total) by mouth as needed.      Cholecalciferol (VITAMIN D) 25 MCG (1000 UT) Oral Tab Take 4,000 Units by mouth daily.      ALPRAZolam 0.25 MG Oral Tab Take 1 tablet (0.25 mg total) by mouth daily as needed (panic attacks). 15 tablet 0    Polyethylene Glycol 3350 17 g Oral Powd Pack Take 17 g by mouth daily as needed (constipation).      docusate sodium 100 MG Oral Cap Take 1 capsule (100 mg total) by mouth 2 (two) times daily.      omega-3 fatty acids 1000 MG Oral Cap Take 1,000 mg by mouth daily.      aspirin 81 MG Oral Chew Tab Chew 1 tablet (81 mg total) by mouth daily.      mometasone (NASONEX) 50 MCG/ACT Nasal Suspension 2 sprays by Nasal route as needed. 17 g 6    celecoxib (CELEBREX) 200 MG Oral Cap Take 1 capsule (200 mg total) by mouth 2 (two) times daily as needed for Pain. (Patient not taking: Reported on 7/15/2024) 60 capsule 0      Allergies   Allergen Reactions    Adhesive Tape      Adhesive Bandages      Dander      Cats      Seasonal OTHER (SEE COMMENTS)     Watery eyes & stuffiness      Past Medical History:    Abdominal pain    lower left    Anxiety    Bloating    Constipation    Dyspareunia     Fibroids    Frequent urination    OAB  Dr. Jiang    Heartburn    carlton/caffeine triggered    Hemorrhoids    High cholesterol    History of depression    Irregular bowel habits    Leaking of urine    Mixed hyperlipidemia    Neoplasm    vaginal wall    Noncompliance    Obesity    Pain in joints    left knee    PMDD (premenstrual dysphoric disorder)    PONV (postoperative nausea and vomiting)    Visual impairment    glasses prn    Vitamin D deficiency    Wears glasses    cataract surgery right eye       Past Surgical History:   Procedure Laterality Date    Breast biopsy      total of 5-6    Cataract extraction w/  intraocular lens implant Right 10/2019    Dr. Cerna    Colonoscopy N/A 2020    repeat 5 yrs, tubular adenoma, [place], Aubree, DO;  Location:  ENDOSCOPY    Endometrial ablation   & ~2014     DUB    Ildefonso localization wire 1 site left (cpt=19281) Left 2010    2 at same time, both benign.    Needle biopsy left Left     ultra sound guided bx, lipoma.    Needle biopsy right Right 2016    ultra sound guided bx, lipoma.    Other surgical history  2023    benign parotid gland nodule removal    Tonsillectomy  1967      Family History   Problem Relation Age of Onset    Breast Cancer Mother 57    Other (lung cancer) Father     No Known Problems Sister     Diabetes Maternal Grandmother     Heart Attack Paternal Grandfather     Kidney Disease Paternal Grandmother         overused OTC med so likely nsaids    Other (lung cancer) Maternal Grandfather       Social History:   Social History     Socioeconomic History    Marital status:    Tobacco Use    Smoking status: Former     Current packs/day: 0.00     Average packs/day: 1 pack/day for 12.0 years (12.0 ttl pk-yrs)     Types: Cigarettes     Start date: 1973     Quit date: 1985     Years since quittin.5    Smokeless tobacco: Never    Tobacco comments:     Updated 24   Vaping Use    Vaping status: Never Used    Substance and Sexual Activity    Alcohol use: Yes     Comment: 1/yr if that, never a problem    Drug use: No    Sexual activity: Yes     Partners: Male   Other Topics Concern    Exercise Yes     Social Determinants of Health     Financial Resource Strain: Low Risk  (1/17/2023)    Received from [facility]    Overall Financial Resource Strain (CARDIA)     Difficulty of Paying Living Expenses: Not hard at all   Food Insecurity: No Food Insecurity (1/17/2023)    Received from [facility]    Hunger Vital Sign     Worried About Running Out of Food in the Last Year: Never true     Ran Out of Food in the Last Year: Never true   Transportation Needs: No Transportation Needs (1/17/2023)    Received from [facility]    PRAPARE - Transportation     Lack of Transportation (Medical): No     Lack of Transportation (Non-Medical): No   Physical Activity: Insufficiently Active (1/17/2023)    Received from [facility]    Exercise Vital Sign     Days of Exercise per Week: 2 days     Minutes of Exercise per Session: 40 min   Stress: Stress Concern Present (1/17/2023)    Received from [facility]    [place] [employer] - Occupational Stress Questionnaire     Feeling of Stress : Rather much   Social Connections: Unknown (1/17/2023)    Received from [facility]    Social Connection and Isolation Panel [NHANES]     Frequency of Communication with Friends and Family: More than three times a week     Frequency of Social Gatherings with Friends and Family: Once a week     Attends Yazdanism Services: Patient declined     Active Member of Clubs or Organizations: No     Attends Club or Organization Meetings: Patient declined     Marital Status:    Housing Stability: Low Risk  (1/17/2023)    Received from [facility]    Housing Stability Vital Sign     Unable to Pay for Housing in the Last Year: No     Number of Places Lived in  the Last Year: 2     Unstable Housing in the Last Year: No     Occ: retired. : y. Children: 0.   Exercise: walking.  Diet: watches minimally     REVIEW OF SYSTEMS:   GENERAL: feels well otherwise  SKIN: denies any unusual skin lesions  EYES:denies blurred vision or double vision  HEENT: denies nasal congestion, sinus pain or ST  LUNGS: denies shortness of breath with exertion  CARDIOVASCULAR: denies chest pain on exertion  GI: denies abdominal pain,denies heartburn  : denies dysuria, vaginal discharge or itching, in menopause   MUSCULOSKELETAL: denies back pain  NEURO: denies headaches  PSYCHE: denies depression or anxiety  HEMATOLOGIC: denies hx of anemia  ENDOCRINE: denies thyroid history  ALL/ASTHMA: denies hx of allergy or asthma    EXAM:   /74 (BP Location: Left arm, Patient Position: Sitting, Cuff Size: large)   Pulse 73   Resp 18   Ht 5' 6\" (1.676 m)   Wt (!) 308 lb 4 oz (139.8 kg)   LMP 05/15/2008   SpO2 97%   BMI 49.75 kg/m²   Body mass index is 49.75 kg/m².   GENERAL: well developed, well nourished,in no apparent distress  SKIN: no rashes,no suspicious lesions  HEENT: atraumatic, normocephalic; notes discomfort on left jawline at times since sx  EYES: EOMI, conjunctiva are clear  NECK: supple,no adenopathy,no bruits, no thyromegaly  CHEST: no chest tenderness  BREASTS: symmetric, no masses, no nipple discharge, no axillary masses bilaterally  LUNGS: clear to auscultation  CARDIO: RRR without murmur  GI: good BS's,no masses, HSM or tenderness  : scant discharge, cervix wnl, pap done, no adnexal or uterine masses  MUSCULOSKELETAL: back is not tender,FROM of the back  EXTREMITIES: no cyanosis, clubbing or edema  NEURO: Oriented times three,cranial nerves are intact,motor and sensory are grossly intact  VASCULAR: 2 + dorsalis pedal pulses bilaterally    ASSESSMENT AND PLAN:   Deonna Vela is a 63 year old female who presents for a complete physical exam.   Pap and pelvic done.    Order put in for dexa scan.    Mammo due.    Self breast exam explained.   Health maintenance, will check:   Orders Placed This Encounter   Procedures    Comp Metabolic Panel (14)    Lipid Panel    Hemoglobin A1C    Hpv High Risk , Thin Prep Collection       Colonoscopy 2025.    Last eye exam --  5/2024  Last dental exam -- 9/2023  Labs reviewed and advised 3 months.  Increase repatha to 420 mg monthly and repeat labs in 3 months.          Pt' s weight is Body mass index is 49.75 kg/m²., recommended low fat diet and aerobic exercise 30 minutes three times weekly.    The patient indicates understanding of these issues and agrees to the plan.  The patient is asked to return in Return in about 3 months (around 10/15/2024) for med check, weight check.  .

## 2024-07-15 NOTE — PROGRESS NOTES
Dear Deonna,    Your A1c is better at 5.9.  Continue to focus on diet and staying active.  We increased the Repatha as we discussed in the office visit today.  Repeat blood work in 3 months and see you at that time.      Sincerely,  Dr. Erickson

## 2024-07-16 DIAGNOSIS — Z12.4 SCREENING FOR CERVICAL CANCER: Primary | ICD-10-CM

## 2024-07-16 LAB — HPV E6+E7 MRNA CVX QL NAA+PROBE: NEGATIVE

## 2024-08-05 ENCOUNTER — PATIENT MESSAGE (OUTPATIENT)
Dept: FAMILY MEDICINE CLINIC | Facility: CLINIC | Age: 64
End: 2024-08-05

## 2024-08-05 DIAGNOSIS — R93.1 ABNORMAL CT SCAN OF HEART: ICD-10-CM

## 2024-08-05 DIAGNOSIS — E78.5 DYSLIPIDEMIA: ICD-10-CM

## 2024-08-05 DIAGNOSIS — I25.10 CORONARY ARTERY DISEASE INVOLVING NATIVE CORONARY ARTERY OF NATIVE HEART WITHOUT ANGINA PECTORIS: ICD-10-CM

## 2024-08-05 RX ORDER — EVOLOCUMAB 140 MG/ML
140 INJECTION, SOLUTION SUBCUTANEOUS
Qty: 6 ML | Refills: 1 | Status: SHIPPED | OUTPATIENT
Start: 2024-08-05 | End: 2024-09-04

## 2024-08-05 NOTE — TELEPHONE ENCOUNTER
From: Deonna Vela  To: Maria L Erickson  Sent: 8/5/2024 10:15 AM CDT  Subject: Repatha    The higher dose Repatha is an infusion and I really don't want to do that. Can I just go back to the Repatha Sure Clik I was using before? Please send prescription for that to [facility]. Thank you!  
LOV- 7/15/24  NOV- 10/9/24    Pt requesting to go back to Repatha SureClick Autoinjector, pt doesn't want to do infusions   Pls advise, previous dose pended  
home

## 2024-08-19 RX ORDER — MELOXICAM 15 MG/1
15 TABLET ORAL DAILY
Qty: 90 TABLET | Refills: 0 | OUTPATIENT
Start: 2024-08-19

## 2024-08-19 NOTE — TELEPHONE ENCOUNTER
Last office visit: 7/15/24   Protocol: pass  Requested medication(s) are due for refill today: yes  Requested medication(s) are on the active medication list same strength, form, dose/ sig: no- discontinued 7/15/24  Requested medication(s) are managed by provider: yes  Patient has already received a courtsey refill: no    NOV: 10/9/24  Last Labs: 7/15/24  Asked to Return: 10/15/24

## 2024-08-20 RX ORDER — MELOXICAM 15 MG/1
15 TABLET ORAL DAILY
Qty: 90 TABLET | Refills: 0 | Status: SHIPPED | OUTPATIENT
Start: 2024-08-20

## 2024-08-20 NOTE — TELEPHONE ENCOUNTER
Last office visit: 7/15/2024   Labs last completed: 7/15/2024  Requested medication(s) are due for refill today: yes  Requested medication(s) are on the active medication list same strength, form, dose/ sig: yes  Requested medication(s) are managed by provider: yes  Patient has already received a courtsey refill: no    NOV: 10/9/2024  Asked to Return: 10/15/2024

## 2024-08-23 RX ORDER — EVOLOCUMAB 140 MG/ML
1 INJECTION, SOLUTION SUBCUTANEOUS
Qty: 2 ML | Refills: 0 | OUTPATIENT
Start: 2024-08-23

## 2024-09-08 ENCOUNTER — PATIENT MESSAGE (OUTPATIENT)
Dept: FAMILY MEDICINE CLINIC | Facility: CLINIC | Age: 64
End: 2024-09-08

## 2024-09-08 DIAGNOSIS — I25.10 CORONARY ARTERY DISEASE INVOLVING NATIVE CORONARY ARTERY OF NATIVE HEART WITHOUT ANGINA PECTORIS: ICD-10-CM

## 2024-09-08 DIAGNOSIS — E78.5 DYSLIPIDEMIA: ICD-10-CM

## 2024-09-09 RX ORDER — EVOLOCUMAB 140 MG/ML
140 INJECTION, SOLUTION SUBCUTANEOUS
Qty: 6 EACH | Refills: 1 | Status: SHIPPED | OUTPATIENT
Start: 2024-09-09 | End: 2024-10-09

## 2024-09-09 NOTE — TELEPHONE ENCOUNTER
From: Deonna Vela  To: Maria L Erickson  Sent: 9/8/2024 11:13 PM CDT  Subject: Repatha Prescription    My insurance recently changed to [employer] ([facility] [employer] no longer valid) and they require a prior authorization for the Repatha. The last one they filled as a \"courtesy\" only. They won't fill this by mail order so prescription still needs to go to [facility] but need PA to go to insurance ([employer] [facility]). I am due 9/20 for next one.  Also - last prescription was prefilled syringes. I used them but have used the SureClick injector before and much preferred that. Can we make sure new prescription is for the SureClick auto injector please.  Attached is new prescription insurance info.  Thank you  Deonna

## 2024-09-09 NOTE — TELEPHONE ENCOUNTER
LOV- 7/15/24  NOV- 10/9/24    Pt requesting refill on Repatha but requesting auto-injector instead of prefilled syringes  Pt is due to take 9/20, will need PA  Last refill 8/13/24  Pended for approval

## 2024-10-07 ENCOUNTER — PATIENT MESSAGE (OUTPATIENT)
Dept: FAMILY MEDICINE CLINIC | Facility: CLINIC | Age: 64
End: 2024-10-07

## 2024-10-08 ENCOUNTER — OFFICE VISIT (OUTPATIENT)
Dept: FAMILY MEDICINE CLINIC | Facility: CLINIC | Age: 64
End: 2024-10-08
Payer: MEDICARE

## 2024-10-08 VITALS
BODY MASS INDEX: 50 KG/M2 | SYSTOLIC BLOOD PRESSURE: 144 MMHG | HEIGHT: 66 IN | HEART RATE: 61 BPM | RESPIRATION RATE: 18 BRPM | OXYGEN SATURATION: 98 % | DIASTOLIC BLOOD PRESSURE: 72 MMHG

## 2024-10-08 DIAGNOSIS — N30.01 ACUTE CYSTITIS WITH HEMATURIA: Primary | ICD-10-CM

## 2024-10-08 DIAGNOSIS — Z79.899 MEDICATION MANAGEMENT: ICD-10-CM

## 2024-10-08 LAB
APPEARANCE: CLEAR
BILIRUBIN: NEGATIVE
GLUCOSE (URINE DIPSTICK): NEGATIVE MG/DL
KETONES (URINE DIPSTICK): NEGATIVE MG/DL
MULTISTIX LOT#: ABNORMAL NUMERIC
NITRITE, URINE: NEGATIVE
OCCULT BLOOD: NEGATIVE
PH, URINE: 5.5 (ref 4.5–8)
PROTEIN (URINE DIPSTICK): NEGATIVE MG/DL
SPECIFIC GRAVITY: 1.01 (ref 1–1.03)
URINE-COLOR: YELLOW
UROBILINOGEN,SEMI-QN: 0.2 MG/DL (ref 0–1.9)

## 2024-10-08 PROCEDURE — 87077 CULTURE AEROBIC IDENTIFY: CPT | Performed by: FAMILY MEDICINE

## 2024-10-08 PROCEDURE — 99214 OFFICE O/P EST MOD 30 MIN: CPT | Performed by: FAMILY MEDICINE

## 2024-10-08 PROCEDURE — 87186 SC STD MICRODIL/AGAR DIL: CPT | Performed by: FAMILY MEDICINE

## 2024-10-08 PROCEDURE — 87086 URINE CULTURE/COLONY COUNT: CPT | Performed by: FAMILY MEDICINE

## 2024-10-08 PROCEDURE — 81003 URINALYSIS AUTO W/O SCOPE: CPT | Performed by: FAMILY MEDICINE

## 2024-10-08 RX ORDER — SULFAMETHOXAZOLE/TRIMETHOPRIM 800-160 MG
1 TABLET ORAL 2 TIMES DAILY
Qty: 14 TABLET | Refills: 0 | Status: SHIPPED | OUTPATIENT
Start: 2024-10-08 | End: 2024-10-15

## 2024-10-08 RX ORDER — OMEPRAZOLE 40 MG/1
40 CAPSULE, DELAYED RELEASE ORAL 2 TIMES DAILY
Qty: 180 CAPSULE | Refills: 1 | Status: SHIPPED | OUTPATIENT
Start: 2024-10-08

## 2024-10-08 NOTE — TELEPHONE ENCOUNTER
Pt noted symptoms first started a week ago   Urgency, discomfort while urinating, arabella colored urine  Appt booked for this afternoon

## 2024-10-08 NOTE — PROGRESS NOTES
Deonna Vela is a 64 year old female.  HPI:   Patient presents with symptoms of UTI. Complaining of urinary frequency, urgency, dysuria, suprapubic pain, hematuria x 4 days. Denies back pain, fever, hematuria.    Current Outpatient Medications   Medication Sig Dispense Refill    Omeprazole 40 MG Oral Capsule Delayed Release Take 1 capsule (40 mg total) by mouth in the morning and 1 capsule (40 mg total) before bedtime. 180 capsule 1    sulfamethoxazole-trimethoprim DS (BACTRIM DS) 800-160 MG Oral Tab per tablet Take 1 tablet by mouth 2 (two) times daily for 7 days. 14 tablet 0    Evolocumab (REPATHA SURECLICK) 140 MG/ML Subcutaneous Solution Auto-injector Inject 140 mg into the skin every 14 (fourteen) days. 6 each 1    Meloxicam 15 MG Oral Tab Take 1 tablet (15 mg total) by mouth daily. 90 tablet 0    sertraline 100 MG Oral Tab Take 2 tablets (200 mg total) by mouth daily. 180 tablet 1    azelastine 0.1 % Nasal Solution 2 sprays by Nasal route 2 (two) times daily. 30 mL 0    valsartan 160 MG Oral Tab Take 1 tablet (160 mg total) by mouth daily. 90 tablet 1    triamcinolone 0.1 % External Cream Apply 1 Application topically 2 (two) times daily. Apply to scar twice daily 45 g 0    Multiple Vitamins-Minerals (ONE DAILY CALCIUM/IRON) Oral Tab Take 1 tablet by mouth daily.      loratadine-pseudoephedrine ER  MG Oral Tablet 24 Hr Take 1 tablet by mouth daily. 30 tablet 1    fexofenadine 180 MG Oral Tab Take 1 tablet (180 mg total) by mouth as needed.      Cholecalciferol (VITAMIN D) 25 MCG (1000 UT) Oral Tab Take 4,000 Units by mouth daily.      ALPRAZolam 0.25 MG Oral Tab Take 1 tablet (0.25 mg total) by mouth daily as needed (panic attacks). 15 tablet 0    Polyethylene Glycol 3350 17 g Oral Powd Pack Take 17 g by mouth daily as needed (constipation).      docusate sodium 100 MG Oral Cap Take 1 capsule (100 mg total) by mouth 2 (two) times daily.      omega-3 fatty acids 1000 MG Oral Cap Take 1,000 mg by mouth  daily.      aspirin 81 MG Oral Chew Tab Chew 1 tablet (81 mg total) by mouth daily.      mometasone (NASONEX) 50 MCG/ACT Nasal Suspension 2 sprays by Nasal route as needed. 17 g 6    celecoxib (CELEBREX) 200 MG Oral Cap Take 1 capsule (200 mg total) by mouth 2 (two) times daily as needed for Pain. (Patient not taking: Reported on 10/8/2024) 60 capsule 0      Past Medical History:    Abdominal pain    lower left    Anxiety    Bloating    Constipation    Dyspareunia    Fibroids    Frequent urination    OAB  Dr. Jiang    Heartburn    carlton/caffeine triggered    Hemorrhoids    High cholesterol    History of depression    Irregular bowel habits    Leaking of urine    Mixed hyperlipidemia    Neoplasm    vaginal wall    Noncompliance    Obesity    Pain in joints    left knee    PMDD (premenstrual dysphoric disorder)    PONV (postoperative nausea and vomiting)    Visual impairment    glasses prn    Vitamin D deficiency    Wears glasses    cataract surgery right eye       Social History:  Social History     Socioeconomic History    Marital status:    Tobacco Use    Smoking status: Former     Current packs/day: 0.00     Average packs/day: 1 pack/day for 12.0 years (12.0 ttl pk-yrs)     Types: Cigarettes     Start date: 1973     Quit date: 1985     Years since quittin.7    Smokeless tobacco: Never    Tobacco comments:     Updated 24   Vaping Use    Vaping status: Never Used   Substance and Sexual Activity    Alcohol use: Yes     Comment: 1/yr if that, never a problem    Drug use: No    Sexual activity: Yes     Partners: Male   Other Topics Concern    Exercise Yes     Social Determinants of Health     Financial Resource Strain: Low Risk  (2023)    Received from [facility]    Overall Financial Resource Strain (CARDIA)     Difficulty of Paying Living Expenses: Not hard at all   Food Insecurity: No Food Insecurity (2023)    [facility]    Hunger Vital  Sign     Worried About Running Out of Food in the Last Year: Never true     Ran Out of Food in the Last Year: Never true   Transportation Needs: No Transportation Needs (1/17/2023)    Received from [facility]    PRAPARE - Transportation     Lack of Transportation (Medical): No     Lack of Transportation (Non-Medical): No   Physical Activity: Insufficiently Active (1/17/2023)    Received from [facility]    Exercise Vital Sign     Days of Exercise per Week: 2 days     Minutes of Exercise per Session: 40 min   Stress: Stress Concern Present (1/17/2023)    Received from [facility]    [place] of [employer]ational Health - Occupational Stress Questionnaire     Feeling of Stress : Rather much   Social Connections: Unknown (1/17/2023)    Received from [facility]    Social Connection and Isolation Panel [NHANES]     Frequency of Communication with Friends and Family: More than three times a week     Frequency of Social Gatherings with Friends and Family: Once a week     Attends [employer] Services: Patient declined     Active Member of Clubs or Organizations: No     Attends Club or Organization Meetings: Patient declined     Marital Status:    Housing Stability: Low Risk  (1/17/2023)    Received from [facility]    Housing Stability Vital Sign     Unable to Pay for Housing in the Last Year: No     Number of Places Lived in the Last Year: 2     Unstable Housing in the Last Year: No         REVIEW OF SYSTEMS:   GENERAL HEALTH: feels well otherwise  SKIN: denies any unusual skin lesions or rashes  RESPIRATORY: no shortness of breath with exertion  CARDIOVASCULAR: denies chest pain on exertion and palpitations.  GI: no nausea or vomiting  NEURO: denies headaches and dizziness.    EXAM:   /72 (BP Location: Left arm, Patient Position: Sitting, Cuff Size: large)   Pulse 61   Resp 18   Ht 5' 6\" (1.676 m)   LMP 05/15/2008   SpO2 98%   BMI  49.75 kg/m²   GENERAL: well developed, well nourished,in no apparent distress  LUNG: Clear to auscultation bilaterally. No W/R/R.  HEART: RRR, no murmur.  SKIN: no rashes,no suspicious lesions  GI: good BS's,no masses, HSM; suprapubic tenderness, no CVAT    ASSESSMENT AND PLAN:   UTI. Plan is to start   Outpatient Encounter Medications as of 10/8/2024   Medication Sig Dispense Refill    Omeprazole 40 MG Oral Capsule Delayed Release Take 1 capsule (40 mg total) by mouth in the morning and 1 capsule (40 mg total) before bedtime. 180 capsule 1    sulfamethoxazole-trimethoprim DS (BACTRIM DS) 800-160 MG Oral Tab per tablet Take 1 tablet by mouth 2 (two) times daily for 7 days. 14 tablet 0    Evolocumab (REPATHA SURECLICK) 140 MG/ML Subcutaneous Solution Auto-injector Inject 140 mg into the skin every 14 (fourteen) days. 6 each 1    Meloxicam 15 MG Oral Tab Take 1 tablet (15 mg total) by mouth daily. 90 tablet 0    sertraline 100 MG Oral Tab Take 2 tablets (200 mg total) by mouth daily. 180 tablet 1    azelastine 0.1 % Nasal Solution 2 sprays by Nasal route 2 (two) times daily. 30 mL 0    valsartan 160 MG Oral Tab Take 1 tablet (160 mg total) by mouth daily. 90 tablet 1    triamcinolone 0.1 % External Cream Apply 1 Application topically 2 (two) times daily. Apply to scar twice daily 45 g 0    Multiple Vitamins-Minerals (ONE DAILY CALCIUM/IRON) Oral Tab Take 1 tablet by mouth daily.      loratadine-pseudoephedrine ER  MG Oral Tablet 24 Hr Take 1 tablet by mouth daily. 30 tablet 1    fexofenadine 180 MG Oral Tab Take 1 tablet (180 mg total) by mouth as needed.      Cholecalciferol (VITAMIN D) 25 MCG (1000 UT) Oral Tab Take 4,000 Units by mouth daily.      ALPRAZolam 0.25 MG Oral Tab Take 1 tablet (0.25 mg total) by mouth daily as needed (panic attacks). 15 tablet 0    Polyethylene Glycol 3350 17 g Oral Powd Pack Take 17 g by mouth daily as needed (constipation).      docusate sodium 100 MG Oral Cap Take 1 capsule  (100 mg total) by mouth 2 (two) times daily.      omega-3 fatty acids 1000 MG Oral Cap Take 1,000 mg by mouth daily.      aspirin 81 MG Oral Chew Tab Chew 1 tablet (81 mg total) by mouth daily.      mometasone (NASONEX) 50 MCG/ACT Nasal Suspension 2 sprays by Nasal route as needed. 17 g 6    celecoxib (CELEBREX) 200 MG Oral Cap Take 1 capsule (200 mg total) by mouth 2 (two) times daily as needed for Pain. (Patient not taking: Reported on 10/8/2024) 60 capsule 0    [DISCONTINUED] Omeprazole 40 MG Oral Capsule Delayed Release Take 1 capsule (40 mg total) by mouth in the morning and 1 capsule (40 mg total) before bedtime. 180 capsule 1     No facility-administered encounter medications on file as of 10/8/2024.    May take otc pyridium for discomfort if needed.      Take antibiotics with food.   Eat yogurt daily or use probiotics.   Avoid soap in vaginal area.     Discussed side effects and expected outcomes of medications.  Instructions given on increasing fluid intake, bladder emptying after intercourse.     The patient indicates understanding of these issues and agrees to the plan.  The patient is asked to return in 3 days if not better. Call if fever, vomiting, worsening symptoms.

## 2024-10-08 NOTE — TELEPHONE ENCOUNTER
From: Deonna Vela  To: Maria L Erickson  Sent: 10/7/2024 11:29 PM CDT  Subject: UTI    Seems like I may have an UTI. Sudden strong frequent urge to pee but but little urine. Slight discomfort while peeing too. Also noticed my urine is a little arabella color. Will you send an order to the lab for a urine test to find out for sure? I can drop by anytime tomorrow to give a sample.  Thank you!

## 2024-10-10 ENCOUNTER — HOSPITAL ENCOUNTER (OUTPATIENT)
Dept: MAMMOGRAPHY | Age: 64
Discharge: HOME | End: 2024-10-10
Attending: FAMILY MEDICINE
Payer: MEDICARE

## 2024-10-10 DIAGNOSIS — Z12.31 SCREENING MAMMOGRAM FOR BREAST CANCER: ICD-10-CM

## 2024-10-10 PROCEDURE — 77067 SCR MAMMO BI INCL CAD: CPT | Performed by: FAMILY MEDICINE

## 2024-10-10 PROCEDURE — 77063 BREAST TOMOSYNTHESIS BI: CPT | Performed by: FAMILY MEDICINE

## 2024-10-11 ENCOUNTER — TELEPHONE (OUTPATIENT)
Dept: FAMILY MEDICINE CLINIC | Facility: CLINIC | Age: 64
End: 2024-10-11

## 2024-10-11 NOTE — TELEPHONE ENCOUNTER
Patient calling she went for her mammogram yesterday and they need additional views and she is wondering if we can put in the order so she can schedule it

## 2024-10-11 NOTE — TELEPHONE ENCOUNTER
Pt returned call earlier this morning, asking for Dr CALDWELL to put order sin for addtl imaging   Advised radiology would call and set it up   Mammo with additional views ordered and scheduled by radiology already

## 2024-11-07 ENCOUNTER — HOSPITAL ENCOUNTER (OUTPATIENT)
Dept: MAMMOGRAPHY | Facility: HOSPITAL | Age: 64
Discharge: HOME | End: 2024-11-07
Attending: FAMILY MEDICINE
Payer: MEDICARE

## 2024-11-07 DIAGNOSIS — R92.2 INCONCLUSIVE MAMMOGRAM: ICD-10-CM

## 2024-11-07 PROCEDURE — 76642 ULTRASOUND BREAST LIMITED: CPT | Performed by: FAMILY MEDICINE

## 2024-11-07 PROCEDURE — 77065 DX MAMMO INCL CAD UNI: CPT | Performed by: FAMILY MEDICINE

## 2024-11-07 PROCEDURE — 77061 BREAST TOMOSYNTHESIS UNI: CPT | Performed by: FAMILY MEDICINE

## 2024-11-30 ENCOUNTER — TELEPHONE (OUTPATIENT)
Dept: FAMILY MEDICINE CLINIC | Facility: CLINIC | Age: 64
End: 2024-11-30

## 2024-12-02 ENCOUNTER — LAB ENCOUNTER (OUTPATIENT)
Dept: LAB | Age: 64
End: 2024-12-02
Attending: FAMILY MEDICINE
Payer: MEDICARE

## 2024-12-02 DIAGNOSIS — Z79.899 MEDICATION MANAGEMENT: ICD-10-CM

## 2024-12-02 DIAGNOSIS — E78.2 MIXED HYPERLIPIDEMIA: ICD-10-CM

## 2024-12-02 DIAGNOSIS — R73.03 PREDIABETES: ICD-10-CM

## 2024-12-02 LAB
ALBUMIN SERPL-MCNC: 4.5 G/DL (ref 3.2–4.8)
ALBUMIN/GLOB SERPL: 1.3 {RATIO} (ref 1–2)
ALP LIVER SERPL-CCNC: 107 U/L
ALT SERPL-CCNC: 14 U/L
ANION GAP SERPL CALC-SCNC: 8 MMOL/L (ref 0–18)
AST SERPL-CCNC: 21 U/L (ref ?–34)
BILIRUB SERPL-MCNC: 0.3 MG/DL (ref 0.2–1.1)
BUN BLD-MCNC: 30 MG/DL (ref 9–23)
CALCIUM BLD-MCNC: 10.1 MG/DL (ref 8.7–10.4)
CHLORIDE SERPL-SCNC: 106 MMOL/L (ref 98–112)
CHOLEST SERPL-MCNC: 223 MG/DL (ref ?–200)
CO2 SERPL-SCNC: 26 MMOL/L (ref 21–32)
CREAT BLD-MCNC: 1.11 MG/DL
EGFRCR SERPLBLD CKD-EPI 2021: 55 ML/MIN/1.73M2 (ref 60–?)
EST. AVERAGE GLUCOSE BLD GHB EST-MCNC: 120 MG/DL (ref 68–126)
FASTING PATIENT LIPID ANSWER: NO
FASTING STATUS PATIENT QL REPORTED: YES
GLOBULIN PLAS-MCNC: 3.4 G/DL (ref 2–3.5)
GLUCOSE BLD-MCNC: 133 MG/DL (ref 70–99)
HBA1C MFR BLD: 5.8 % (ref ?–5.7)
HDLC SERPL-MCNC: 48 MG/DL (ref 40–59)
LDLC SERPL CALC-MCNC: 139 MG/DL (ref ?–100)
NONHDLC SERPL-MCNC: 175 MG/DL (ref ?–130)
OSMOLALITY SERPL CALC.SUM OF ELEC: 298 MOSM/KG (ref 275–295)
POTASSIUM SERPL-SCNC: 4.6 MMOL/L (ref 3.5–5.1)
PROT SERPL-MCNC: 7.9 G/DL (ref 5.7–8.2)
SODIUM SERPL-SCNC: 140 MMOL/L (ref 136–145)
TRIGL SERPL-MCNC: 203 MG/DL (ref 30–149)
VLDLC SERPL CALC-MCNC: 38 MG/DL (ref 0–30)

## 2024-12-02 PROCEDURE — 36415 COLL VENOUS BLD VENIPUNCTURE: CPT

## 2024-12-02 PROCEDURE — 80061 LIPID PANEL: CPT

## 2024-12-02 PROCEDURE — 83036 HEMOGLOBIN GLYCOSYLATED A1C: CPT

## 2024-12-02 PROCEDURE — 80053 COMPREHEN METABOLIC PANEL: CPT

## 2024-12-02 RX ORDER — EVOLOCUMAB 140 MG/ML
1 INJECTION, SOLUTION SUBCUTANEOUS
COMMUNITY
Start: 2024-09-18

## 2024-12-03 ENCOUNTER — OFFICE VISIT (OUTPATIENT)
Dept: FAMILY MEDICINE CLINIC | Facility: CLINIC | Age: 64
End: 2024-12-03
Payer: MEDICARE

## 2024-12-03 VITALS
HEART RATE: 75 BPM | HEIGHT: 66 IN | RESPIRATION RATE: 20 BRPM | OXYGEN SATURATION: 98 % | BODY MASS INDEX: 47.09 KG/M2 | DIASTOLIC BLOOD PRESSURE: 78 MMHG | WEIGHT: 293 LBS | SYSTOLIC BLOOD PRESSURE: 150 MMHG

## 2024-12-03 DIAGNOSIS — E55.9 VITAMIN D DEFICIENCY: ICD-10-CM

## 2024-12-03 DIAGNOSIS — R22.42 MASS OF LEFT LOWER EXTREMITY: ICD-10-CM

## 2024-12-03 DIAGNOSIS — F41.9 ANXIETY: ICD-10-CM

## 2024-12-03 DIAGNOSIS — Z79.899 MEDICATION MANAGEMENT: ICD-10-CM

## 2024-12-03 DIAGNOSIS — M25.552 LEFT HIP PAIN: ICD-10-CM

## 2024-12-03 DIAGNOSIS — Z78.0 MENOPAUSE: ICD-10-CM

## 2024-12-03 DIAGNOSIS — R73.03 PREDIABETES: ICD-10-CM

## 2024-12-03 DIAGNOSIS — I10 ESSENTIAL HYPERTENSION: Primary | ICD-10-CM

## 2024-12-03 DIAGNOSIS — M16.12 PRIMARY OSTEOARTHRITIS OF LEFT HIP: ICD-10-CM

## 2024-12-03 DIAGNOSIS — I25.10 CORONARY ARTERY DISEASE INVOLVING NATIVE CORONARY ARTERY OF NATIVE HEART WITHOUT ANGINA PECTORIS: ICD-10-CM

## 2024-12-03 DIAGNOSIS — E78.5 DYSLIPIDEMIA: ICD-10-CM

## 2024-12-03 DIAGNOSIS — F33.1 MODERATE EPISODE OF RECURRENT MAJOR DEPRESSIVE DISORDER (HCC): ICD-10-CM

## 2024-12-03 PROCEDURE — 99215 OFFICE O/P EST HI 40 MIN: CPT | Performed by: FAMILY MEDICINE

## 2024-12-03 RX ORDER — VALSARTAN 160 MG/1
240 TABLET ORAL DAILY
Qty: 135 TABLET | Refills: 1 | Status: SHIPPED | OUTPATIENT
Start: 2024-12-03

## 2024-12-03 RX ORDER — TRAMADOL HYDROCHLORIDE 50 MG/1
50 TABLET ORAL 2 TIMES DAILY
Qty: 60 TABLET | Refills: 1 | Status: SHIPPED | OUTPATIENT
Start: 2024-12-03

## 2024-12-03 RX ORDER — OMEPRAZOLE 40 MG/1
40 CAPSULE, DELAYED RELEASE ORAL 2 TIMES DAILY
Qty: 180 CAPSULE | Refills: 1 | Status: CANCELLED | OUTPATIENT
Start: 2024-12-03

## 2024-12-03 RX ORDER — VALSARTAN 160 MG/1
160 TABLET ORAL DAILY
Qty: 90 TABLET | Refills: 1 | Status: SHIPPED | OUTPATIENT
Start: 2024-12-03 | End: 2024-12-03

## 2024-12-03 RX ORDER — SERTRALINE HYDROCHLORIDE 100 MG/1
200 TABLET, FILM COATED ORAL DAILY
Qty: 180 TABLET | Refills: 1 | Status: SHIPPED | OUTPATIENT
Start: 2024-12-03

## 2024-12-03 RX ORDER — MELOXICAM 15 MG/1
15 TABLET ORAL DAILY
Qty: 90 TABLET | Refills: 0 | Status: SHIPPED | OUTPATIENT
Start: 2024-12-03

## 2024-12-03 NOTE — PROGRESS NOTES
Deonna Vela is a 64 year old female.  HPI:   Pt is here for a med check and left hip pain.  She states her BP has been high but she is in chronic pain.  She may go to [place] to evaluate her hip since they will do surgery at higher BMI's.  Prediabetes -- better at 5.8   HTN -- stable  CAD/dyslipidemia -- stable; saw Dr. Ballesteros and doing well  GERD -- stable  Vitamin d def -- stable  Depression -- on sertraline 150 mg daily. -- May go up to 200 mg since mood is down due to chronic pain.  Speaking with Antonietta -- due to reschedule.  No suicidal thoughts.  Patient complains of a lump that she noted in her left lateral proximal calf region recently would like to know if it something to be concerned about.  She thinks there may have been a bruise there but not sure.   Medications reviewed.      sertraline 100 MG Oral Tab, Take 1.5 tablets (150 mg total) by mouth daily., Disp: 135 tablet, Rfl: 0  Omeprazole 40 MG Oral Capsule Delayed Release, Take 1 capsule (40 mg total) by mouth daily., Disp: 90 capsule, Rfl: 1  valsartan 160 MG Oral Tab, Take 1 tablet (160 mg total) by mouth daily., Disp: 30 tablet, Rfl: 2  Cholecalciferol (VITAMIN D) 25 MCG (1000 UT) Oral Tab, Take 4,000 Units by mouth daily., Disp: , Rfl:   ALPRAZolam 0.25 MG Oral Tab, Take 1 tablet (0.25 mg total) by mouth daily as needed (panic attacks)., Disp: 15 tablet, Rfl: 0  Polyethylene Glycol 3350 17 g Oral Powd Pack, Take 17 g by mouth daily as needed (constipation)., Disp: , Rfl:   docusate sodium 100 MG Oral Cap, Take 100 mg by mouth 2 (two) times daily.  , Disp: , Rfl:   omega-3 fatty acids 1000 MG Oral Cap, Take 1,000 mg by mouth daily., Disp: , Rfl:   Fexofenadine HCl (ALLEGRA OR), Take 1 tablet by mouth as needed., Disp: , Rfl:   Multiple Vitamin (MULTI-VITAMIN DAILY OR), Take 1 tablet by mouth daily.  , Disp: , Rfl:   aspirin 81 MG Oral Chew Tab, Chew 81 mg by mouth daily., Disp: , Rfl:   mometasone (NASONEX) 50 MCG/ACT Nasal Suspension, 2 sprays  by Nasal route as needed., Disp: 17 g, Rfl: 6    No current facility-administered medications for this visit.     Allergies   Allergen Reactions    Adhesive Tape      Adhesive Bandages      Dander      Cats      Seasonal OTHER (SEE COMMENTS)     Watery eyes & stuffiness      Past Medical History:    Abdominal pain    lower left    Anxiety    Bloating    Constipation    Dyspareunia    Fibroids    Frequent urination    OAB  Dr. Jiang    Heartburn    carlton/caffeine triggered    Hemorrhoids    High cholesterol    History of depression    Irregular bowel habits    Leaking of urine    Mixed hyperlipidemia    Neoplasm    vaginal wall    Noncompliance    Obesity    Pain in joints    left knee    PMDD (premenstrual dysphoric disorder)    PONV (postoperative nausea and vomiting)    Visual impairment    glasses prn    Vitamin D deficiency    Wears glasses    cataract surgery right eye       Social History:  Social History     Socioeconomic History    Marital status:    Tobacco Use    Smoking status: Former     Current packs/day: 0.00     Average packs/day: 1 pack/day for 12.0 years (12.0 ttl pk-yrs)     Types: Cigarettes     Start date: 1973     Quit date: 1985     Years since quittin.9    Smokeless tobacco: Never    Tobacco comments:     Updated 24   Vaping Use    Vaping status: Never Used   Substance and Sexual Activity    Alcohol use: Yes     Comment: 1/yr if that, never a problem    Drug use: No    Sexual activity: Yes     Partners: Male   Other Topics Concern    Exercise Yes     Social Drivers of Health     Financial Resource Strain: Low Risk  (2023)    Received from [facility]    Overall Financial Resource Strain (CARDIA)     Difficulty of Paying Living Expenses: Not hard at all   Food Insecurity: No Food Insecurity (2023)    Received from [facility]    Hunger Vital Sign     Worried About Running Out of Food in the Last Year: Never true     Ran Out of Food  in the Last Year: Never true   Transportation Needs: No Transportation Needs (1/17/2023)    Received from [facility]    PRAPARE - Transportation     Lack of Transportation (Medical): No     Lack of Transportation (Non-Medical): No   Physical Activity: Insufficiently Active (1/17/2023)    Received from [facility]    Exercise Vital Sign     Days of Exercise per Week: 2 days     Minutes of Exercise per Session: 40 min   Stress: Stress Concern Present (1/17/2023)    Received from [facility]    [employer] - Occupational Stress Questionnaire     Feeling of Stress : Rather much   Social Connections: Unknown (1/17/2023)    Received from [facility]    Social Connection and Isolation Panel [NHANES]     Frequency of Communication with Friends and Family: More than three times a week     Frequency of Social Gatherings with Friends and Family: Once a week     Attends Druze Services: Patient declined     Active Member of Clubs or Organizations: No     Attends Club or Organization Meetings: Patient declined     Marital Status:    Housing Stability: Low Risk  (1/17/2023)    Received from [facility]    Housing Stability Vital Sign     Unable to Pay for Housing in the Last Year: No     Number of Places Lived in the Last Year: 2     Unstable Housing in the Last Year: No        Results for orders placed or performed in visit on 12/02/24   Comp Metabolic Panel (14)    Collection Time: 12/02/24  2:03 PM   Result Value Ref Range    Glucose 133 (H) 70 - 99 mg/dL    Sodium 140 136 - 145 mmol/L    Potassium 4.6 3.5 - 5.1 mmol/L    Chloride 106 98 - 112 mmol/L    CO2 26.0 21.0 - 32.0 mmol/L    Anion Gap 8 0 - 18 mmol/L    BUN 30 (H) 9 - 23 mg/dL    Creatinine 1.11 (H) 0.55 - 1.02 mg/dL    Calcium, Total 10.1 8.7 - 10.4 mg/dL    Calculated Osmolality 298 (H) 275 - 295 mOsm/kg    eGFR-Cr 55 (L) >=60 mL/min/1.73m2    AST 21 <34 U/L     ALT 14 10 - 49 U/L    Alkaline Phosphatase 107 50 - 130 U/L    Bilirubin, Total 0.3 0.2 - 1.1 mg/dL    Total Protein 7.9 5.7 - 8.2 g/dL    Albumin 4.5 3.2 - 4.8 g/dL    Globulin  3.4 2.0 - 3.5 g/dL    A/G Ratio 1.3 1.0 - 2.0    Patient Fasting for CMP? Yes    Lipid Panel    Collection Time: 12/02/24  2:03 PM   Result Value Ref Range    Cholesterol, Total 223 (H) <200 mg/dL    HDL Cholesterol 48 40 - 59 mg/dL    Triglycerides 203 (H) 30 - 149 mg/dL    LDL Cholesterol 139 (H) <100 mg/dL    VLDL 38 (H) 0 - 30 mg/dL    Non HDL Chol 175 (H) <130 mg/dL    Patient Fasting for Lipid? No    Hemoglobin A1C    Collection Time: 12/02/24  2:03 PM   Result Value Ref Range    HgbA1C 5.8 (H) <5.7 %    Estimated Average Glucose 120 68 - 126 mg/dL       REVIEW OF SYSTEMS:   GENERAL: feels well otherwise  SKIN: denies any unusual skin lesions; lump on left thigh at hip region; has been doing dry needling  HEENT: fullness in her neck even before neck sx and has not changed  LUNGS: denies shortness of breath with exertion  CARDIOVASCULAR: denies chest pain on exertion  GI: denies abdominal pain,denies heartburn  MUSCULOSKELETAL: denies back pain; neck pain  EXTREMITIES:  No pain or numbness    EXAM:   /78   Pulse 75   Resp 20   Ht 5' 6\" (1.676 m)   Wt (!) 311 lb 8 oz (141.3 kg)   [facility] 05/15/2008   SpO2 98%   BMI 50.28 kg/m²   GENERAL: well developed, well nourished,in no apparent distress  SKIN: no rashes,no suspicious lesions; healing scar behind left ear and 3 small areas -- keloid/scar and healed scar in front of left ear  HEENT: atraumatic, normocephalic  NECK: supple,no adenopathy,no bruits  LUNGS: clear to auscultation bilaterally  CARDIO: RRR without murmur  GI: soft, good BS's; no HSM  EXTREMITIES: no cyanosis, clubbing or edema; 1 cm  -- lesion on left lateral proximal calf -- resolving bruise around it vs. Superficial veins?      ASSESSMENT AND PLAN:     Encounter Diagnoses   Name Primary?    Essential hypertension  Yes    Coronary artery disease involving native coronary artery of native heart without angina pectoris     Left hip pain     Primary osteoarthritis of left hip     Anxiety     Moderate episode of recurrent major depressive disorder (HCC)     Mass of left lower extremity     Menopause     Dyslipidemia     Prediabetes     Vitamin D deficiency     Medication management        Orders Placed This Encounter   Procedures    CBC W Differential W Platelet [E]    Comp Metabolic Panel (14) [E]    Lipid Panel [E]    TSH W Reflex To Free T4 [E]    Vitamin D [E]    Hemoglobin A1C [E]    Microalb/Creat Ratio, Random Urine [E]    Urinalysis, Routine [E]       Meds & Refills for this Visit:  Requested Prescriptions     Signed Prescriptions Disp Refills    sertraline 100 MG Oral Tab 180 tablet 1     Sig: Take 2 tablets (200 mg total) by mouth daily.    Meloxicam 15 MG Oral Tab 90 tablet 0     Sig: Take 1 tablet (15 mg total) by mouth daily.    valsartan 160 MG Oral Tab 135 tablet 1     Sig: Take 1.5 tablets (240 mg total) by mouth daily.    traMADol 50 MG Oral Tab 60 tablet 1     Sig: Take 1 tablet (50 mg total) by mouth in the morning and 1 tablet (50 mg total) before bedtime.       Imaging & Consults:  OP REFERRAL TO [facility]  XR DEXA BONE DENSITOMETRY (CPT=77080)  US LEG LEFT LIMITED (CPT=76882)     Labs reviewed.    Prediabetes -- labs ordered for 6 months  Dyslipidemia/CAD -- on repatha and doing well  Allergies -- stable  GERD -- omeprazole to 40 mg twice a day since ENT saw irritation around her esophagus  Morbid obesity -- focus on diet and exercise  Left hip OA and pain  -- will see [facility]; tramadol prn and placard filled out  Hypertension -  On valsartan  -- increase from 160 to 240 mg and monitor BP.  CAD -- stable; saw Dr. Ballesteros and due to see him yearly  Depression-stable on sertraline; continue therapy with Antonietta  Mass on leg -- resolving bruise/clot?  US venous ordered    Vaccines  UTD.  Mammo UTD.  Dexa pending.    Length of visit/charting -- greater than 40 minutes      The patient indicates understanding of these issues and agrees to the plan.  Return in about 2 months (around 2/3/2025) for med check 30.

## 2024-12-06 ENCOUNTER — HOSPITAL ENCOUNTER (OUTPATIENT)
Dept: ULTRASOUND IMAGING | Facility: HOSPITAL | Age: 64
Discharge: HOME | End: 2024-12-06
Attending: FAMILY MEDICINE
Payer: MEDICARE

## 2024-12-06 DIAGNOSIS — R22.42 MASS OF LEFT LOWER EXTREMITY: ICD-10-CM

## 2024-12-06 PROCEDURE — 76882 US LMTD JT/FCL EVL NVASC XTR: CPT | Performed by: FAMILY MEDICINE

## 2024-12-09 ENCOUNTER — TELEPHONE (OUTPATIENT)
Dept: FAMILY MEDICINE CLINIC | Facility: CLINIC | Age: 64
End: 2024-12-09

## 2025-01-15 ENCOUNTER — OFFICE VISIT (OUTPATIENT)
Dept: PHYSICAL THERAPY | Facility: HOSPITAL | Age: 65
End: 2025-01-15
Attending: FAMILY MEDICINE
Payer: MEDICARE

## 2025-01-15 DIAGNOSIS — M25.552 LEFT HIP PAIN: Primary | ICD-10-CM

## 2025-01-15 DIAGNOSIS — M16.12 PRIMARY OSTEOARTHRITIS OF LEFT HIP: ICD-10-CM

## 2025-01-15 PROCEDURE — 97161 PT EVAL LOW COMPLEX 20 MIN: CPT

## 2025-01-15 PROCEDURE — 97110 THERAPEUTIC EXERCISES: CPT

## 2025-01-15 NOTE — PROGRESS NOTES
LOWER EXTREMITY EVALUATION:     Diagnosis:   Left hip pain (M25.552)  Primary osteoarthritis of left hip (M16.12)      Referring Provider: Maria L Erickson  Date of Evaluation:    1/15/2025    Precautions:  None Next MD visit:   none scheduled  Date of Surgery: n/a     PATIENT SUMMARY   Deonna Vela is a 64 year old female who presents to therapy today with complaints of left hip pain that began a few years ago. Progressively gotten worse. Has seen multiple ortho doctors for hip replacement. Won't operate due to increased BMI. Has been working with weight loss program. Reports that left knee has began to be painful  Pain in the left lateral calf. Ultrasound to rule out varicose veins and DVT  New onset of left glute pain, sharp that occurs with movement.  Has completed PT in the past for the hip   Has signed water program that begins in February  Pt describes pain level current 2-3/10, at best 2/10, at worst 7/10.   Pain location: left anterior hip, left lateral calf  Pain description: constant dull achiness  Current functional limitations include walking (15-20 minutes), stairs, standing (15-20 min), getting up off the floor.     Deonna describes prior level of function working to improve activity level.   Work/Home Responsibilities: cleaning can be difficult due to the bending  Pt goals include increase mobility of the hip, walk up the stairs \"normally\", increase distance with walking.  Past medical history was reviewed with Deonna. Significant findings include   Past Medical History:    Abdominal pain    lower left    Anxiety    Bloating    Constipation    Dyspareunia    Fibroids    Frequent urination    OAB  Dr. Jiang    Heartburn    carlton/caffeine triggered    Hemorrhoids    High cholesterol    History of depression    Irregular bowel habits    Leaking of urine    Mixed hyperlipidemia    Neoplasm    vaginal wall    Noncompliance    Obesity    Pain in joints    left knee    PMDD (premenstrual dysphoric disorder)     PONV (postoperative nausea and vomiting)    Visual impairment    glasses prn    Vitamin D deficiency    Wears glasses    cataract surgery right eye 2019         ASSESSMENT  Deonna presents to physical therapy evaluation with primary c/o left hip pain, groin pain, and gluteal pain. The results of the objective tests and measures show decreased range of motion of the left hip joint, tenderness to palpation, weakness, antalgic gait.  Functional deficits include but are not limited to standing, walking, stairs.  Signs and symptoms are consistent with diagnosis of severe hip OA. Pt and PT discussed evaluation findings, pathology, POC and HEP.  Pt voiced understanding and performs HEP correctly without reported pain. Skilled Physical Therapy is medically necessary to address the above impairments and reach functional goals.     OBJECTIVE:   Observation: antalgic gait  Palpation: tender to palpation of left gluteal region, left lateral hip, left groin region. Tenderness to palpation of left anterior calf  Sensation: light touch sensation intact    AROM: (* denotes performed with pain)  Hip Knee Foot/Ankle   Flexion: R 110; L 95*  Extension: R 5; L 0*  Abduction: R 35; L 30  ER: R 15, tightness; L 3* gluteal pain  IR: R 10; L 5*groin pain Flexion: R NL; L NL  Extension: R NL; L NL    Not tested     Accessory motion: hypomobility with anterior and posterior glides at the left hip compared to right    Flexibility:  Hip Flexor: R NL, L NL  Hamstrings: R SLR 50; L SLR 35* anterior hip pinching  Piriformis: R min impairment; L severe impairment  Quads: R NL; L NL  Gastroc-soleus: R min impairment; L min impairment    Strength/MMT: (* denotes performed with pain)  Hip Knee Foot/Ankle   Flexion: R 3+/5; L 3+*/5  Extension: R 3+/5; L 3+*/5  Abduction: R 3+/5; L 3+*/5  ER: R 3+/5; L 3-*/5  IR: R 3+/5; L 3+*/5 Flexion: R 5/5; L 5/5  Extension: R 5/5; L 5/5    DF: R 5/5; L 5/5  PF: R 5/5; L 5/5       Special tests:   Hip Special  Tests:  Scour Test: R -, L +  Bismark Test: R -, L -  Jne Test: R -, L +  Shay's Test: R NT, L NT  Leg Length: R -, L -  SI compression/distraction: -  Trendelenburg Sign: R -, L +  Symptom Modification-Long Axis Distraction (Hip OA): no change in symptoms  Symptom Modification - Resistive Side Stepping (non-arthritic hip impairment): NT  Symptom Modification -Hip Flex with Trunk Control (non-arthritic hip impairment): NT      Gait: pt ambulates on level ground with assistive device of straight, antalgia, decreased stance phase on left leg, and decreased ksenia  Balance: SLS: R 2 sec, L 1 sec    Today’s Treatment and Response: TUG to be completed at next session  Pt education was provided on exam findings, treatment diagnosis, treatment plan, expectations, and prognosis. Pt was also provided recommendations for activity modifications, possible soreness after evaluation, modalities as needed [ice/heat], importance of remaining active, and shoe wear.  Patient was instructed in and issued a HEP for: Access Code: [email or web address]  URL: [email or web address]  Date: 01/15/2025  Prepared by: Kei Miller    Exercises  - Clamshell with Resistance  - 2 x daily - 7 x weekly - 2 sets - 15 reps  - Supine Bridge with Resistance Band  - 2 x daily - 7 x weekly - 2 sets - 15 reps  - Sidelying Hip Abduction  - 2 x daily - 7 x weekly - 2 sets - 15 reps  - Seated Piriformis Stretch with Trunk Bend  - 2 x daily - 7 x weekly - 1 sets - 6 reps - 30 hold    Charges: PT Eval Low Complexity, 1 therex      Total Timed Treatment: 10 min     Total Treatment Time: 45 min     Based on clinical rationale and outcome measures, this evaluation involved Low Complexity decision making due to 1-2 personal factors/comorbidities, 3 body structures involved/activity limitations, and evolving symptoms including changing pain levels.  PLAN OF CARE:    Goals: (to be met in 12 visits)   Not Met Progress Toward Partially Met Met   Pt will  have improved hip AROM Flex to 110 deg to be able to don/doff shoes and perform sit to stand transfers without difficulty. [] [] [] []   Pt will have improved hip AROM ABD to 35 deg to be able perform bed mobility and perform car transfers without difficulty. [] [] [] []   Pt will improve hip ABD and ER strength to 4+/5 to increase ease with standing and walking. [] [] [] []   Pt will be able to squat to  light objects around the house with <2/10 hip pain. [] [] [] []   Pt will improve functional hip strength to report ability to ascend/descend 1 flight of stairs reciprocally without use of handrail. [] [] [] []   Pt will demonstrate improved SLS to >10 seconds MARTIN to promote safety and decrease risk of falls on uneven surfaces such as grass and gravel. [] [] [] []   Pt will perform TUG in <10 seconds, demonstrating improved gait speed for improved community ambulation. [] [] [] []   Pt will be independent and compliant with comprehensive HEP to maintain progress achieved in PT. [] [] [] []         Frequency / Duration: Patient will be seen for 2 x/week or a total of 12 visits over a 90 day period. Treatment will include: Gait training, Manual Therapy, Neuromuscular Re-education, Therapeutic Activities, Therapeutic Exercise, and Home Exercise Program instruction    Education or treatment limitation: None  Rehab Potential:good    LEFS Score  LEFS Score: (Patient-Rptd) 37.5 % (1/14/2025 11:40 PM)      Patient/Family/Caregiver was advised of these findings, precautions, and treatment options and has agreed to actively participate in planning and for this course of care.    Thank you for your referral. Please co-sign or sign and return this letter via fax as soon as possible to [phone]. If you have any questions, please contact me at Dept: [phone]    Sincerely,  Electronically signed by therapist: Kei Miller, PT  Physician's certification required: Yes  I certify the need for these services  furnished under this plan of treatment and while under my care.    X___________________________________________________ Date____________________    Certification From: 1/15/2025  To:4/15/2025

## 2025-01-20 ENCOUNTER — APPOINTMENT (OUTPATIENT)
Dept: PHYSICAL THERAPY | Facility: HOSPITAL | Age: 65
End: 2025-01-20
Attending: FAMILY MEDICINE
Payer: MEDICARE

## 2025-01-22 ENCOUNTER — OFFICE VISIT (OUTPATIENT)
Dept: PHYSICAL THERAPY | Facility: HOSPITAL | Age: 65
End: 2025-01-22
Attending: FAMILY MEDICINE
Payer: MEDICARE

## 2025-01-22 PROCEDURE — 97110 THERAPEUTIC EXERCISES: CPT

## 2025-01-22 PROCEDURE — 97140 MANUAL THERAPY 1/> REGIONS: CPT

## 2025-01-22 NOTE — PROGRESS NOTES
Diagnosis:   Left hip pain (M25.552)  Primary osteoarthritis of left hip (M16.12)      Referring Provider: Maria L Erickson  Date of Evaluation:    1/15/2025    Precautions:  None Next MD visit:   none scheduled  Date of Surgery: n/a   Insurance Primary/Secondary: MEDICARE / COMMERCIAL     # Auth Visits: POC 12 visits            Subjective: I tried doing all the exercises twice a day and it was a lot so I decreased them to once a day. And that seemed to do better. I have been having some difficulty with doing the clamshells with the band  I have been doing a lot of walking. I did about 10-15 minutes and the hip started bothering me so I called it a day.     Pain: 2/10 lateral thigh and lateral calf  3-4/10 posterior left gluteal region       Objective:   AROM: (* denotes performed with pain)  Hip   Flexion: R 110; L 95*  Extension: R 5; L 0*  Abduction: R 35; L 30  ER: R 15, tightness; L 3* gluteal pain  IR: R 10; L 5*groin pain   Following distraction: pt achieved 105 deg      Assessment: pt tolerated session fairly well. Pt continues to report moderate pain of the left gluteal region and mild pain along the left lateral thigh and calf. Pt tolerated lateral distraction well, and demonstrated improvement of left hip flexion. Pt reported some discomfort with completing SL clamshells with band, therefore tried hooklying clamshells and well tolerated. Added hooklying self distraction for home. Plans to progress into more functional/standing exercises at next session       Goals:    (to be met in 12 visits)    Not Met Progress Toward Partially Met Met   Pt will have improved hip AROM Flex to 110 deg to be able to don/doff shoes and perform sit to stand transfers without difficulty. []  []  []  []    Pt will have improved hip AROM ABD to 35 deg to be able perform bed mobility and perform car transfers without difficulty. []  []  []  []    Pt will improve hip ABD and ER strength to 4+/5 to increase ease with standing and  walking. []  []  []  []    Pt will be able to squat to  light objects around the house with <2/10 hip pain. []  []  []  []    Pt will improve functional hip strength to report ability to ascend/descend 1 flight of stairs reciprocally without use of handrail. []  []  []  []    Pt will demonstrate improved SLS to >10 seconds MARTIN to promote safety and decrease risk of falls on uneven surfaces such as grass and gravel. []  []  []  []    Pt will perform TUG in <10 seconds, demonstrating improved gait speed for improved community ambulation. []  []  []  []    Pt will be independent and compliant with comprehensive HEP to maintain progress achieved in PT. []  []  []  []         Plan: continue with strengthening and AROM. Progress to standing exercises as tolerated  Date: 1/22/2025  TX#: 2/12 Date:                 TX#: 3/ Date:                 TX#: 4/ Date:                 TX#: 5/ Date:   Tx#: 6/   Manual Therapy  Inferior glide on left hip  Lateral distraction grade III-IV on L hip  Lateral distraction MWM into flexion on L        Therex  Hooklying clamshells RTB x 10  Bent knee fallouts with RTB x 10 ea leg  SL hip abduction (d/c due to pain)  Self hooklying traction                      HEP: Access Code: [email or web address]  URL: [email or web address]  Date: 01/22/2025  Prepared by: Kei Miller    Exercises  - Clamshell with Resistance  - 2 x daily - 7 x weekly - 2 sets - 15 reps  - Supine Bridge with Resistance Band  - 2 x daily - 7 x weekly - 2 sets - 15 reps  - Sidelying Hip Abduction  - 2 x daily - 7 x weekly - 2 sets - 15 reps  - Seated Piriformis Stretch with Trunk Bend  - 2 x daily - 7 x weekly - 1 sets - 6 reps - 30 hold  - Supine Hip Abduction  - 2 x daily - 7 x weekly - 2 sets - 10 reps  - Hooklying Lumbar Traction  - 2 x daily - 7 x weekly - 1 sets - 3 reps - 30 hold    Charges: 2 MT 1 therex       Total Timed Treatment: 45 min  Total Treatment Time: 45 min

## 2025-01-27 ENCOUNTER — OFFICE VISIT (OUTPATIENT)
Dept: PHYSICAL THERAPY | Facility: HOSPITAL | Age: 65
End: 2025-01-27
Attending: FAMILY MEDICINE
Payer: MEDICARE

## 2025-01-27 PROCEDURE — 97110 THERAPEUTIC EXERCISES: CPT

## 2025-01-27 PROCEDURE — 97140 MANUAL THERAPY 1/> REGIONS: CPT

## 2025-01-27 NOTE — PROGRESS NOTES
Diagnosis:   Left hip pain (M25.552)  Primary osteoarthritis of left hip (M16.12)      Referring Provider: Maria L Erickson  Date of Evaluation:    1/15/2025    Precautions:  None Next MD visit:   none scheduled  Date of Surgery: n/a   Insurance Primary/Secondary: MEDICARE / COMMERCIAL     # Auth Visits: POC 12 visits            Subjective: I was a little bit sore the day after our last session.   I have been doing the 10-15 minutes of walking.     Pain: 2/10 lateral thigh and lateral calf  3-4/10 posterior left gluteal region       Objective:   AROM: (* denotes performed with pain)  Hip   Flexion: R 110; L 95*  Extension: R 5; L 0*  Abduction: R 35; L 30  ER: R 15, tightness; L 3* gluteal pain  IR: R 10; L 5*groin pain   Following distraction: pt achieved 105 deg      Assessment: pt tolerated session fairly well. Pt continues to report moderate pain of the left gluteal region and mild pain along the left lateral thigh and calf. Pt demonstrated tenderness along lateral gluteal region, and responded well to STM. Pt tolerated standing hip abduction on the left, compared to SL hip abduction. Pt had difficulty with step up activity leading with left leg on 4 inch step but well tolerated on right. Able to complete left leg leading on 2 inch step. Added standing hip abductions to HEP.      Goals:    (to be met in 12 visits)    Not Met Progress Toward Partially Met Met   Pt will have improved hip AROM Flex to 110 deg to be able to don/doff shoes and perform sit to stand transfers without difficulty. []  []  []  []    Pt will have improved hip AROM ABD to 35 deg to be able perform bed mobility and perform car transfers without difficulty. []  []  []  []    Pt will improve hip ABD and ER strength to 4+/5 to increase ease with standing and walking. []  []  []  []    Pt will be able to squat to  light objects around the house with <2/10 hip pain. []  []  []  []    Pt will improve functional hip strength to report ability to  ascend/descend 1 flight of stairs reciprocally without use of handrail. []  []  []  []    Pt will demonstrate improved SLS to >10 seconds MARTIN to promote safety and decrease risk of falls on uneven surfaces such as grass and gravel. []  []  []  []    Pt will perform TUG in <10 seconds, demonstrating improved gait speed for improved community ambulation. []  []  []  []    Pt will be independent and compliant with comprehensive HEP to maintain progress achieved in PT. []  []  []  []         Plan: continue with strengthening and AROM.  Date: 1/22/2025  TX#: 2/12 Date:1/27/2025                 TX#: 3/12 Date:                 TX#: 4/ Date:                 TX#: 5/ Date:   Tx#: 6/   Manual Therapy  Inferior glide on left hip  Lateral distraction grade III-IV on L hip  Lateral distraction MWM into flexion on L  Manual Therapy 25 min  Inferior glide on left hip  Lateral distraction grade III-IV on L hip  Lateral distraction MWM into flexion on L   STM to left gluteal region       Therex  Hooklying clamshells RTB x 10  Bent knee fallouts with RTB x 10 ea leg  SL hip abduction (d/c due to pain)  Self hooklying traction  TherEx 20 min  Supine bridge 2 x 10  Hooklying clamshells RTB x 10  Bent knee fallouts with RTB x 10 ea leg  Standing hip abduction on LLE only 2 x 8  Step ups LLE on 2 inch step 2 x 10  Step ups RLE on 4 inch step 2 x 10                     HEP: Access Code: [email or web address]  URL: [email or web address]  Date: 01/22/2025  Prepared by: Kei Miller    Exercises  - Clamshell with Resistance  - 2 x daily - 7 x weekly - 2 sets - 15 reps  - Supine Bridge with Resistance Band  - 2 x daily - 7 x weekly - 2 sets - 15 reps  - Seated Piriformis Stretch with Trunk Bend  - 2 x daily - 7 x weekly - 1 sets - 6 reps - 30 hold  - Supine Hip Abduction  - 2 x daily - 7 x weekly - 2 sets - 10 reps  - Hooklying Lumbar Traction  - 2 x daily - 7 x weekly - 1 sets - 3 reps - 30 hold  - standing hip abduction 2 x 10      Charges: 2 MT 1 therex       Total Timed Treatment: 45 min  Total Treatment Time: 45 min

## 2025-01-29 ENCOUNTER — OFFICE VISIT (OUTPATIENT)
Dept: PHYSICAL THERAPY | Facility: HOSPITAL | Age: 65
End: 2025-01-29
Attending: FAMILY MEDICINE
Payer: MEDICARE

## 2025-01-29 PROCEDURE — 97110 THERAPEUTIC EXERCISES: CPT

## 2025-01-29 PROCEDURE — 97140 MANUAL THERAPY 1/> REGIONS: CPT

## 2025-01-29 NOTE — PROGRESS NOTES
Diagnosis:   Left hip pain (M25.552)  Primary osteoarthritis of left hip (M16.12)      Referring Provider: Maria L Erickson  Date of Evaluation:    1/15/2025    Precautions:  None Next MD visit:   none scheduled  Date of Surgery: n/a   Insurance Primary/Secondary: MEDICARE / COMMERCIAL     # Auth Visits: POC 12 visits            Subjective: I have a little more pain today. I didn't take all of the pain medication today.      Pain: 2-3/10 lateral thigh and lateral calf  3/10 posterior left gluteal region       Objective:   AROM: (* denotes performed with pain)  Hip   Flexion: R 110; L 95*  Extension: R 5; L 0*  Abduction: R 35; L 30  ER: R 15, tightness; L 3* gluteal pain  IR: R 10; L 5*groin pain   Following distraction: pt achieved 105 deg      Assessment: pt tolerated session fairly well. Pt continues to report moderate pain of the left gluteal region and mild pain along the left lateral thigh and calf. Pt continues to demonstrate tenderness to STM to the left gluteal region. Pt reports that she feels as though her leg is going to buckle during ambulation. Attempted SLR with quad set although increase hip flexion caused pain. Pt completed seated LAQ without complaints.       Goals:    (to be met in 12 visits)    Not Met Progress Toward Partially Met Met   Pt will have improved hip AROM Flex to 110 deg to be able to don/doff shoes and perform sit to stand transfers without difficulty. []  []  []  []    Pt will have improved hip AROM ABD to 35 deg to be able perform bed mobility and perform car transfers without difficulty. []  []  []  []    Pt will improve hip ABD and ER strength to 4+/5 to increase ease with standing and walking. []  []  []  []    Pt will be able to squat to  light objects around the house with <2/10 hip pain. []  []  []  []    Pt will improve functional hip strength to report ability to ascend/descend 1 flight of stairs reciprocally without use of handrail. []  []  []  []    Pt will  demonstrate improved SLS to >10 seconds MARTIN to promote safety and decrease risk of falls on uneven surfaces such as grass and gravel. []  []  []  []    Pt will be independent and compliant with comprehensive HEP to maintain progress achieved in PT. []  []  []  []         Plan: continue with strengthening and AROM as tolerated  Date: 1/22/2025  TX#: 2/12 Date:1/27/2025                 TX#: 3/12 Date:1/29/2025                 TX#: 4/12 Date:                 TX#: 5/ Date:   Tx#: 6/   Manual Therapy  Inferior glide on left hip  Lateral distraction grade III-IV on L hip  Lateral distraction MWM into flexion on L  Manual Therapy 25 min  Inferior glide on left hip  Lateral distraction grade III-IV on L hip  Lateral distraction MWM into flexion on L   STM to left gluteal region  Manual Therapy 25 min  Inferior glide on left hip  Lateral distraction grade III-IV on L hip  Lateral distraction MWM into flexion on L   STM to left gluteal region      Therex  Hooklying clamshells RTB x 10  Bent knee fallouts with RTB x 10 ea leg  SL hip abduction (d/c due to pain)  Self hooklying traction  TherEx 20 min  Supine bridge 2 x 10  Hooklying clamshells RTB x 10  Bent knee fallouts with RTB x 10 ea leg  Standing hip abduction on LLE only 2 x 8  Step ups LLE on 2 inch step 2 x 10  Step ups RLE on 4 inch step 2 x 10  TherEx  Supine bridge 2 x 10   Hooklying clamshells RTB 2 x 10  Bent knee fallouts with RTB x 10 ea leg  Sit to stands x 8   LAQ x 10                    HEP: Access Code: [email or web address]  URL: [email or web address]  Date: 01/29/2025  Prepared by: Kei Miller    Exercises  - Clamshell with Resistance  - 2 x daily - 7 x weekly - 2 sets - 15 reps  - Supine Bridge with Resistance Band  - 2 x daily - 7 x weekly - 2 sets - 15 reps  - Sidelying Hip Abduction  - 2 x daily - 7 x weekly - 2 sets - 15 reps  - Seated Piriformis Stretch with Trunk Bend  - 2 x daily - 7 x weekly - 1 sets - 6 reps - 30 hold  - Supine Hip  Abduction  - 2 x daily - 7 x weekly - 2 sets - 10 reps  - Hooklying Lumbar Traction  - 2 x daily - 7 x weekly - 1 sets - 3 reps - 30 hold  - Sitting Knee Extension with Resistance  - 2 x daily - 7 x weekly - 2 sets - 10 reps  - standing hip abduction 2 x 10     Charges: 2 MT 1 therex       Total Timed Treatment: 45 min  Total Treatment Time: 45 min

## 2025-02-11 ENCOUNTER — APPOINTMENT (OUTPATIENT)
Dept: PHYSICAL THERAPY | Facility: HOSPITAL | Age: 65
End: 2025-02-11
Attending: FAMILY MEDICINE
Payer: MEDICARE

## 2025-02-12 ENCOUNTER — APPOINTMENT (OUTPATIENT)
Dept: GENERAL RADIOLOGY | Facility: HOSPITAL | Age: 65
End: 2025-02-12
Attending: EMERGENCY MEDICINE
Payer: MEDICARE

## 2025-02-12 ENCOUNTER — HOSPITAL ENCOUNTER (EMERGENCY)
Facility: HOSPITAL | Age: 65
Discharge: HOME | End: 2025-02-12
Attending: EMERGENCY MEDICINE
Payer: MEDICARE

## 2025-02-12 VITALS
OXYGEN SATURATION: 100 % | DIASTOLIC BLOOD PRESSURE: 43 MMHG | HEART RATE: 75 BPM | SYSTOLIC BLOOD PRESSURE: 146 MMHG | RESPIRATION RATE: 19 BRPM | WEIGHT: 292 LBS | HEIGHT: 66.5 IN | BODY MASS INDEX: 46.37 KG/M2 | TEMPERATURE: 100 F

## 2025-02-12 DIAGNOSIS — B34.9 VIRAL SYNDROME: Primary | ICD-10-CM

## 2025-02-12 LAB
ALBUMIN SERPL-MCNC: 4.5 G/DL (ref 3.2–4.8)
ALBUMIN/GLOB SERPL: 1.5 {RATIO} (ref 1–2)
ALP LIVER SERPL-CCNC: 94 U/L
ALT SERPL-CCNC: 19 U/L
ANION GAP SERPL CALC-SCNC: 11 MMOL/L (ref 0–18)
AST SERPL-CCNC: 25 U/L (ref ?–34)
BASOPHILS # BLD AUTO: 0.01 X10(3) UL (ref 0–0.2)
BASOPHILS NFR BLD AUTO: 0.1 %
BILIRUB SERPL-MCNC: 0.7 MG/DL (ref 0.2–1.1)
BILIRUB UR QL STRIP.AUTO: NEGATIVE
BUN BLD-MCNC: 21 MG/DL (ref 9–23)
CALCIUM BLD-MCNC: 9.8 MG/DL (ref 8.7–10.6)
CHLORIDE SERPL-SCNC: 100 MMOL/L (ref 98–112)
CLARITY UR REFRACT.AUTO: CLEAR
CO2 SERPL-SCNC: 22 MMOL/L (ref 21–32)
CREAT BLD-MCNC: 1.26 MG/DL
EGFRCR SERPLBLD CKD-EPI 2021: 47 ML/MIN/1.73M2 (ref 60–?)
EOSINOPHIL # BLD AUTO: 0.03 X10(3) UL (ref 0–0.7)
EOSINOPHIL NFR BLD AUTO: 0.3 %
ERYTHROCYTE [DISTWIDTH] IN BLOOD BY AUTOMATED COUNT: 14.6 %
FLUAV + FLUBV RNA SPEC NAA+PROBE: NEGATIVE
FLUAV + FLUBV RNA SPEC NAA+PROBE: NEGATIVE
GLOBULIN PLAS-MCNC: 3 G/DL (ref 2–3.5)
GLUCOSE BLD-MCNC: 120 MG/DL (ref 70–99)
GLUCOSE UR STRIP.AUTO-MCNC: NORMAL MG/DL
HCT VFR BLD AUTO: 36.3 %
HGB BLD-MCNC: 12.8 G/DL
IMM GRANULOCYTES # BLD AUTO: 0.04 X10(3) UL (ref 0–1)
IMM GRANULOCYTES NFR BLD: 0.4 %
KETONES UR STRIP.AUTO-MCNC: 10 MG/DL
LEUKOCYTE ESTERASE UR QL STRIP.AUTO: 25
LYMPHOCYTES # BLD AUTO: 1.04 X10(3) UL (ref 1–4)
LYMPHOCYTES NFR BLD AUTO: 9.6 %
MCH RBC QN AUTO: 30.8 PG (ref 26–34)
MCHC RBC AUTO-ENTMCNC: 35.3 G/DL (ref 31–37)
MCV RBC AUTO: 87.3 FL
MONOCYTES # BLD AUTO: 1.13 X10(3) UL (ref 0.1–1)
MONOCYTES NFR BLD AUTO: 10.4 %
NEUTROPHILS # BLD AUTO: 8.61 X10 (3) UL (ref 1.5–7.7)
NEUTROPHILS # BLD AUTO: 8.61 X10(3) UL (ref 1.5–7.7)
NEUTROPHILS NFR BLD AUTO: 79.2 %
NITRITE UR QL STRIP.AUTO: NEGATIVE
OSMOLALITY SERPL CALC.SUM OF ELEC: 280 MOSM/KG (ref 275–295)
PH UR STRIP.AUTO: 5.5 [PH] (ref 5–8)
PLATELET # BLD AUTO: 155 10(3)UL (ref 150–450)
POTASSIUM SERPL-SCNC: 4.2 MMOL/L (ref 3.5–5.1)
PROT SERPL-MCNC: 7.5 G/DL (ref 5.7–8.2)
PROT UR STRIP.AUTO-MCNC: NEGATIVE MG/DL
RBC # BLD AUTO: 4.16 X10(6)UL
RSV RNA SPEC NAA+PROBE: NEGATIVE
SARS-COV-2 RNA RESP QL NAA+PROBE: NOT DETECTED
SODIUM SERPL-SCNC: 133 MMOL/L (ref 136–145)
SP GR UR STRIP.AUTO: 1.01 (ref 1–1.03)
TROPONIN I SERPL HS-MCNC: 5 NG/L
UROBILINOGEN UR STRIP.AUTO-MCNC: NORMAL MG/DL
WBC # BLD AUTO: 10.9 X10(3) UL (ref 4–11)

## 2025-02-12 PROCEDURE — 85025 COMPLETE CBC W/AUTO DIFF WBC: CPT

## 2025-02-12 PROCEDURE — 80053 COMPREHEN METABOLIC PANEL: CPT

## 2025-02-12 PROCEDURE — 84484 ASSAY OF TROPONIN QUANT: CPT | Performed by: EMERGENCY MEDICINE

## 2025-02-12 PROCEDURE — 84484 ASSAY OF TROPONIN QUANT: CPT

## 2025-02-12 PROCEDURE — 0241U SARS-COV-2/FLU A AND B/RSV BY PCR (GENEXPERT): CPT

## 2025-02-12 PROCEDURE — 99284 EMERGENCY DEPT VISIT MOD MDM: CPT

## 2025-02-12 PROCEDURE — 0241U SARS-COV-2/FLU A AND B/RSV BY PCR (GENEXPERT): CPT | Performed by: EMERGENCY MEDICINE

## 2025-02-12 PROCEDURE — 80053 COMPREHEN METABOLIC PANEL: CPT | Performed by: EMERGENCY MEDICINE

## 2025-02-12 PROCEDURE — 99285 EMERGENCY DEPT VISIT HI MDM: CPT

## 2025-02-12 PROCEDURE — 71045 X-RAY EXAM CHEST 1 VIEW: CPT | Performed by: EMERGENCY MEDICINE

## 2025-02-12 PROCEDURE — 85025 COMPLETE CBC W/AUTO DIFF WBC: CPT | Performed by: EMERGENCY MEDICINE

## 2025-02-12 PROCEDURE — 81001 URINALYSIS AUTO W/SCOPE: CPT | Performed by: EMERGENCY MEDICINE

## 2025-02-12 PROCEDURE — 93005 ELECTROCARDIOGRAM TRACING: CPT

## 2025-02-12 PROCEDURE — 87086 URINE CULTURE/COLONY COUNT: CPT | Performed by: EMERGENCY MEDICINE

## 2025-02-12 PROCEDURE — 93010 ELECTROCARDIOGRAM REPORT: CPT

## 2025-02-12 PROCEDURE — 96360 HYDRATION IV INFUSION INIT: CPT

## 2025-02-12 RX ORDER — SODIUM CHLORIDE 9 MG/ML
INJECTION, SOLUTION INTRAVENOUS CONTINUOUS
Status: DISCONTINUED | OUTPATIENT
Start: 2025-02-12 | End: 2025-02-12

## 2025-02-12 NOTE — ED INITIAL ASSESSMENT (HPI)
Pt states vomiting, fever, chest tightness, body aches, fatigue.  Pt states symptoms started Monday night.

## 2025-02-12 NOTE — ED PROVIDER NOTES
Patient Seen in: [facility] Emergency Department      History     Chief Complaint   Patient presents with    Fever    Chest Pain Angina     Stated Complaint: fever, vomiting, chest pain    Subjective:   HPI      64-year-old female comes in the hospital complaint of having difficulty with fevers, chills, body aches since Monday.  She has had 1 episode of vomiting this morning.  She has had a bit of postnasal drip but denies any significant cough or phlegm production.  She is having some soreness by her chest over the last 3 days.  She is denying any abdominal pain.  She has some increased urinary frequency.  She denies any flank pain.  She denies any diarrhea.  She is denying any other specific complaints at this time.    Objective:     Past Medical History:    Abdominal pain    lower left    Anxiety    Bloating    Constipation    Dyspareunia    Fibroids    Frequent urination    OAB  Dr. Jiang    Heartburn    carlton/caffeine triggered    Hemorrhoids    High cholesterol    History of depression    Irregular bowel habits    Leaking of urine    Mixed hyperlipidemia    Neoplasm    vaginal wall    Noncompliance    Obesity    Pain in joints    left knee    PMDD (premenstrual dysphoric disorder)    PONV (postoperative nausea and vomiting)    Visual impairment    glasses prn    Vitamin D deficiency    Wears glasses    cataract surgery right eye 2019              Past Surgical History:   Procedure Laterality Date    Breast biopsy      total of 5-6    Cataract extraction w/  intraocular lens implant Right 10/2019    Dr. Cerna    Colonoscopy N/A 07/17/2020    repeat 5 yrs, tubular adenoma, Aubree Brown, ;  Location:  ENDOSCOPY    Endometrial ablation  2005/6 & ~2014 2/2 [facility] localization wire 1 site left (cpt=19281) Left 2010    2 at same time, both benign.    Needle biopsy left Left 2016    ultra sound guided bx, lipoma.    Needle biopsy right Right 11/2016    ultra sound guided bx, lipoma.    Other  surgical history  2023    benign parotid gland nodule removal    Tonsillectomy  1967                Social History     Socioeconomic History    Marital status:    Tobacco Use    Smoking status: Former     Current packs/day: 0.00     Average packs/day: 1 pack/day for 12.0 years (12.0 ttl pk-yrs)     Types: Cigarettes     Start date: 1973     Quit date: 1985     Years since quittin.1    Smokeless tobacco: Never    Tobacco comments:     Updated 24   Vaping Use    Vaping status: Never Used   Substance and Sexual Activity    Alcohol use: Yes     Comment: yr if that, never a problem    Drug use: No    Sexual activity: Yes     Partners: Male   Other Topics Concern    Exercise Yes     Social Drivers of Health     Food Insecurity: No Food Insecurity (2023)    Received from [facility]    Hunger Vital Sign     Worried About Running Out of Food in the Last Year: Never true     Ran Out of Food in the Last Year: Never true   Transportation Needs: No Transportation Needs (2023)    Received from [facility]    PRAPARE - Transportation     Lack of Transportation (Medical): No     Lack of Transportation (Non-Medical): No   Housing Stability: Low Risk  (2023)    Received from [facility]    Housing Stability Vital Sign     Unable to Pay for Housing in the Last Year: No     Number of Places Lived in the Last Year: 2     Unstable Housing in the Last Year: No                  Physical Exam     ED Triage Vitals [25 1244]   /82   Pulse 85   Resp 16   Temp 98.1 °F (36.7 °C)   Temp src Oral   SpO2 97 %   O2 Device None (Room air)       Current Vitals:   Vital Signs  BP: 146/43  Pulse: 75  Resp: 19  Temp: 99.5 °F (37.5 °C)  Temp src: Oral  MAP (mmHg): 79    Oxygen Therapy  SpO2: 100 %  O2 Device: None (Room air)        Physical Exam  HEENT; NCAT, EOMI, throat clear, neck supple, no LAD, no JVD  Heart S1S2 RRR, chest wall tenderness is reproducible  and present  lungs: CTAB  abd: Soft NT, ND,  NABS without rebound or guarding, no CVA tenderness noted  Ext no C/C/E    ED Course     Labs Reviewed   COMP METABOLIC PANEL (14) - Abnormal; Notable for the following components:       Result Value    Glucose 120 (*)     Sodium 133 (*)     Creatinine 1.26 (*)     eGFR-Cr 47 (*)     All other components within normal limits   CBC WITH DIFFERENTIAL WITH PLATELET - Abnormal; Notable for the following components:    Neutrophil Absolute Prelim 8.61 (*)     Neutrophil Absolute 8.61 (*)     Monocyte Absolute 1.13 (*)     All other components within normal limits   URINALYSIS WITH CULTURE REFLEX - Abnormal; Notable for the following components:    Ketones Urine 10 (*)     Blood Urine Trace (*)     Leukocyte Esterase Urine 25 (*)     WBC Urine 6-10 (*)     Squamous Epi. Cells Few (*)     All other components within normal limits   TROPONIN I HIGH SENSITIVITY - Normal   SARS-COV-2/FLU A AND B/RSV BY PCR (GENEXPERT) - Normal    Narrative:     This test is intended for the qualitative detection and differentiation of SARS-CoV-2, influenza A, influenza B, and respiratory syncytial virus (RSV) viral RNA in nasopharyngeal or nares swabs from individuals suspected of respiratory viral infection consistent with COVID-19 by their healthcare provider. Signs and symptoms of respiratory viral infection due to SARS-CoV-2, influenza, and RSV can be similar.    Test performed using the Xpert Xpress SARS-CoV-2/FLU/RSV (real time RT-PCR)  assay on the GeneXpert instrument, [employer], [place] [address].   This test is being used under the Food and Drug Administration's Emergency Use Authorization.    The authorized Fact Sheet for Healthcare Providers for this assay is available upon request from the laboratory.   RAINBOW DRAW LAVENDER   RAINBOW DRAW LIGHT GREEN   RAINBOW DRAW BLUE   URINE CULTURE, ROUTINE     EKG    Rate, intervals and axes as noted on EKG Report.  Rate: 83  Rhythm: Sinus  Rhythm  Reading: , QRS of 84, patient is normal sinus with left axis deviation without ischemic change.           ED Course as of 02/12/25 1738  ------------------------------------------------------------  Time: 02/12 1736  Comment: While the patient's urine showed no obvious infection.  Troponin was negative.  White count was normal.  The patient's CMP was unremarkable.  The patient had a chest x-ray on that appears interpreted showed no acute cardiopulmonary process.  Read the radiology report as well.  Patient was given initially IV fluid while here.       Medications   sodium chloride 0.9 % IV bolus 500 mL (500 mL Intravenous New Bag 2/12/25 1538)     Followed by   sodium chloride 0.9% infusion (has no administration in time range)       XR CHEST AP PORTABLE  (CPT=71045)    Result Date: 2/12/2025  PROCEDURE:  XR CHEST AP PORTABLE  (CPT=71045)  TECHNIQUE:  AP chest radiograph was obtained.  COMPARISON:  None.  INDICATIONS:  fever, vomiting, chest pain  PATIENT STATED HISTORY: (As transcribed by Technologist)  Patient stated she has been having chest pressure for 3 days.    FINDINGS:  Lung volumes are satisfactory.  No focal consolidation.  CP angles are sharp.  No pneumothorax.  Heart and pulmonary vessels are normal caliber allowing for portable technique.  Smooth mediastinal contours.               CONCLUSION:  No evidence of active cardiopulmonary disease.   LOCATION:  [facility]      Dictated by (CST): Sergio Jon MD on 2/12/2025 at 2:04 PM     Finalized by (CST): Sergio Jon MD on 2/12/2025 at 2:04 PM             MDM      Differential diagnosis included influenza, COVID, RSV, pneumonia but not limited such.  The patient's workup for these infectious sources I did negative.  Her symptoms are consistent with a viral syndrome at this time should be discharged home for further outpatient management care.    Patient was screened and evaluated during this visit.   As a treating physician attending to the  patient, I determined, within reasonable clinical confidence and prior to discharge, that an emergency medical condition was not or was no longer present.  There was no indication for further evaluation, treatment or admission on an emergency basis.       The usual and customary discharge instuctions were discussed given the patient's ER course.  We discussed signs and symptoms that should prompt the patient's immediate return to the emergency department.   Reasonable over the counter and prescription treatment options and Physician follow up plan was discussed.       The patient is discharged in good condition.     This note was prepared using Dragon Medical voice recognition dictation software.  As a result errors may occur.  When identified to these areas have been corrected.  While every attempt is made to correct errors during dictation discrepancies may still exist.  Please contact if there are any errors.        Medical Decision Making      Disposition and Plan     Clinical Impression:  1. Viral syndrome         Disposition:  Discharge  2/12/2025  5:38 pm    Follow-up:  Maria L Erickson DO  [address] [address]  [phone]    Schedule an appointment as soon as possible for a visit in 3 day(s)            Medications Prescribed:  Current Discharge Medication List              Supplementary Documentation:

## 2025-02-13 ENCOUNTER — PATIENT OUTREACH (OUTPATIENT)
Dept: CASE MANAGEMENT | Age: 65
End: 2025-02-13

## 2025-02-13 ENCOUNTER — TELEPHONE (OUTPATIENT)
Dept: FAMILY MEDICINE CLINIC | Facility: CLINIC | Age: 65
End: 2025-02-13

## 2025-02-13 LAB
ATRIAL RATE: 83 BPM
P AXIS: 63 DEGREES
P-R INTERVAL: 160 MS
Q-T INTERVAL: 372 MS
QRS DURATION: 84 MS
QTC CALCULATION (BEZET): 437 MS
R AXIS: -37 DEGREES
T AXIS: 35 DEGREES
VENTRICULAR RATE: 83 BPM

## 2025-02-13 RX ORDER — ACETAMINOPHEN 500 MG
500 TABLET ORAL EVERY 6 HOURS PRN
COMMUNITY

## 2025-02-13 NOTE — TELEPHONE ENCOUNTER
RYAN, Spoke to patient for Transitions of Care call today.  Patient states that she is recovering at home and does not feel she needs a sooner appt with PCP than the one scheduled for 2/25/25.  ER Follow-up appointment needed by 2/19/25.  .    BOOK BY DATE: 2/19/25

## 2025-02-13 NOTE — PROGRESS NOTES
Transitions of Care Navigation  Discharge Date: 25  Contact Date: 2025    Transitions of Care Assessment:  GEN Initial Assessment    General:  Assessment completed with: Patient  Patient Subjective: I'm feeling the same still, have a little fever and headaches. I've been taking tylenol every 6 hrs.  Just a little pain in the belly. The chest pain was the acid reflux.  Chief Complaint: Viral syndrome  Verify patient name and  with patient/ caregiver: Yes    Hospital Stay/Discharge:  Tell me what you understand of why you were in the hospital or emergency department: viral syndrome  Prior to leaving the hospital were your Discharge Instructions reviewed with you?: Yes  Did you receive a copy of your written Discharge Instructions?: Yes  What questions do you have about your Discharge Instructions?: none  Do you feel better or worse since you left the hospital or emergency department?: Same    Follow - Up Appointment:  Do you have a follow-up appointment?: No  Are there any barriers to getting to your follow-up appointment?: No    Home Health/DME:  Prior to leaving the hospital was Home Health (HH) arranged for you?: No     Prior to leaving the hospital or emergency department was Durable Medical Equipment (DME), medical supplies, or infusions arranged for you?: No  Are DME/medical supply/infusions needs identified by staff during this assessment?: No     Medications/Diet:  Did any of your medications change, during or after your hospital stay or ED visit?: No  Do you understand what your medications are for and possible side effects?: Yes  Are there any reasons that keep you from taking your medication as prescribed?: No  Any concerns about medication refills?: No    Were you given a different diet per your Discharge Instructions?: No     Questions/Concerns:  Do you have any questions or concerns that have not been discussed?: No         Nursing Interventions: NCM reviewed discharge instructions and when  to seek medical attention with the patient. She states that she feels the same as yesterday. She states that she figured out that the chest pain she had was due to her acid reflux as she hadn't take her reflux medication. She states that she was able to take it and that pain has resolved. She states that she still has a little pain in the belly and some nausea but no has not had any further vomiting. She states that she still has a little fever and headaches. Temp today was 100 and she is taking Tylenol every 6 hrs. NCM advised hydration and rest; she v/u. NCM advised when to return to the ER; she v/u. Med review completed. She denied having any questions or concerns at this time.     Medications:  Medication Reconciliation:  I am aware of an inpatient discharge within the last 30 days.  The discharge medication list has been reconciled with the patient's current medication list and reviewed by me. See medication list for additions of new medication, and changes to current doses of medications and discontinued medications.  Current Outpatient Medications   Medication Sig Dispense Refill    sertraline 100 MG Oral Tab Take 2 tablets (200 mg total) by mouth daily. 180 tablet 1    Meloxicam 15 MG Oral Tab Take 1 tablet (15 mg total) by mouth daily. 90 tablet 0    valsartan 160 MG Oral Tab Take 1.5 tablets (240 mg total) by mouth daily. 135 tablet 1    traMADol 50 MG Oral Tab Take 1 tablet (50 mg total) by mouth in the morning and 1 tablet (50 mg total) before bedtime. 60 tablet 1    REPATHA SURECLICK 140 MG/ML Subcutaneous Solution Auto-injector Inject 1 mL into the skin every 14 (fourteen) days.      Omeprazole 40 MG Oral Capsule Delayed Release Take 1 capsule (40 mg total) by mouth in the morning and 1 capsule (40 mg total) before bedtime. 180 capsule 1    azelastine 0.1 % Nasal Solution 2 sprays by Nasal route 2 (two) times daily. 30 mL 0    triamcinolone 0.1 % External Cream Apply 1 Application topically 2 (two)  times daily. Apply to scar twice daily 45 g 0    Multiple Vitamins-Minerals (ONE DAILY CALCIUM/IRON) Oral Tab Take 1 tablet by mouth daily.      loratadine-pseudoephedrine ER  MG Oral Tablet 24 Hr Take 1 tablet by mouth daily. 30 tablet 1    fexofenadine 180 MG Oral Tab Take 1 tablet (180 mg total) by mouth as needed. (Patient not taking: Reported on 2/12/2025)      Cholecalciferol (VITAMIN D) 25 MCG (1000 UT) Oral Tab Take 4,000 Units by mouth daily.      ALPRAZolam 0.25 MG Oral Tab Take 1 tablet (0.25 mg total) by mouth daily as needed (panic attacks). 15 tablet 0    Polyethylene Glycol 3350 17 g Oral Powd Pack Take 17 g by mouth daily as needed (constipation).      docusate sodium 100 MG Oral Cap Take 1 capsule (100 mg total) by mouth 2 (two) times daily.      omega-3 fatty acids 1000 MG Oral Cap Take 1,000 mg by mouth daily.      aspirin 81 MG Oral Chew Tab Chew 1 tablet (81 mg total) by mouth daily.      mometasone (NASONEX) 50 MCG/ACT Nasal Suspension 2 sprays by Nasal route as needed. 17 g 6       Follow-up Appointments:  Your appointments       Date & Time Appointment Department (Center)    Feb 17, 2025 3:00 PM CST Physical Therapy Ortho Treatment with [facility] Physical Therapy ([facility])        Feb 19, 2025 3:00 PM CST Physical Therapy Ortho Treatment with [facility] Physical Therapy [facility])        Feb 25, 2025 2:30 PM CST Exam - Established with Maria L Erickson DO [facility] ([facility])        Feb 26, 2025 3:00 PM CST Physical Therapy Ortho Treatment with [facility] Physical Therapy ([facility])        Feb 28, 2025 2:45 PM CST Physical Therapy Ortho Treatment with [facility] Physical Therapy ([facility])        Nov 10, 2025 1:00 PM CST Established Patient  with Ray Dominguez MD [facility] ([facility])              [facility] Physical Therapy  [facility]  [address] [place] [address]  [phone] [facility]  [address]  [facility] [id number] [phone] D[facility]OS[facility]  [facility]  [address]  [place] [address]  [phone]            [facility]  Was TCC Ordered: No    Primary Care Provider (If no TCC appointment)  Does patient already have a PCP appointment scheduled? Yes, pt has an appt on 2/25/25 and denied the need for a sooner one. NCM sent TE to PCP office.       Specialist  Does the patient have any other follow-up appointment(s) need to be scheduled? No      Book By Date: 2/19/25

## 2025-02-14 ENCOUNTER — OFFICE VISIT (OUTPATIENT)
Dept: FAMILY MEDICINE CLINIC | Facility: CLINIC | Age: 65
End: 2025-02-14
Payer: MEDICARE

## 2025-02-14 ENCOUNTER — TELEPHONE (OUTPATIENT)
Dept: PHYSICAL THERAPY | Facility: HOSPITAL | Age: 65
End: 2025-02-14

## 2025-02-14 ENCOUNTER — HOSPITAL ENCOUNTER (OUTPATIENT)
Dept: GENERAL RADIOLOGY | Age: 65
Discharge: HOME | End: 2025-02-14
Payer: MEDICARE

## 2025-02-14 ENCOUNTER — APPOINTMENT (OUTPATIENT)
Dept: PHYSICAL THERAPY | Facility: HOSPITAL | Age: 65
End: 2025-02-14
Attending: FAMILY MEDICINE
Payer: MEDICARE

## 2025-02-14 VITALS
SYSTOLIC BLOOD PRESSURE: 138 MMHG | OXYGEN SATURATION: 97 % | RESPIRATION RATE: 18 BRPM | HEART RATE: 90 BPM | WEIGHT: 293 LBS | HEIGHT: 66.5 IN | DIASTOLIC BLOOD PRESSURE: 78 MMHG | BODY MASS INDEX: 46.53 KG/M2

## 2025-02-14 DIAGNOSIS — R19.5 LOOSE STOOLS: ICD-10-CM

## 2025-02-14 DIAGNOSIS — R50.9 FEVER, UNSPECIFIED FEVER CAUSE: ICD-10-CM

## 2025-02-14 DIAGNOSIS — R19.4 CHANGE IN STOOL HABITS: ICD-10-CM

## 2025-02-14 DIAGNOSIS — R10.30 LOWER ABDOMINAL PAIN: ICD-10-CM

## 2025-02-14 DIAGNOSIS — R19.5 LOOSE STOOLS: Primary | ICD-10-CM

## 2025-02-14 DIAGNOSIS — R14.0 BLOATED ABDOMEN: ICD-10-CM

## 2025-02-14 LAB
ALBUMIN SERPL-MCNC: 4.5 G/DL (ref 3.2–4.8)
ALBUMIN/GLOB SERPL: 1.5 {RATIO} (ref 1–2)
ALP LIVER SERPL-CCNC: 98 U/L
ALT SERPL-CCNC: 23 U/L
ANION GAP SERPL CALC-SCNC: 13 MMOL/L (ref 0–18)
AST SERPL-CCNC: 25 U/L (ref ?–34)
BASOPHILS # BLD AUTO: 0.02 X10(3) UL (ref 0–0.2)
BASOPHILS NFR BLD AUTO: 0.2 %
BILIRUB SERPL-MCNC: 0.4 MG/DL (ref 0.2–1.1)
BUN BLD-MCNC: 14 MG/DL (ref 9–23)
CALCIUM BLD-MCNC: 10.2 MG/DL (ref 8.7–10.6)
CHLORIDE SERPL-SCNC: 102 MMOL/L (ref 98–112)
CO2 SERPL-SCNC: 23 MMOL/L (ref 21–32)
CREAT BLD-MCNC: 1.2 MG/DL
EGFRCR SERPLBLD CKD-EPI 2021: 50 ML/MIN/1.73M2 (ref 60–?)
EOSINOPHIL # BLD AUTO: 0.17 X10(3) UL (ref 0–0.7)
EOSINOPHIL NFR BLD AUTO: 2 %
ERYTHROCYTE [DISTWIDTH] IN BLOOD BY AUTOMATED COUNT: 14.5 %
FASTING STATUS PATIENT QL REPORTED: NO
GLOBULIN PLAS-MCNC: 3.1 G/DL (ref 2–3.5)
GLUCOSE BLD-MCNC: 104 MG/DL (ref 70–99)
HCT VFR BLD AUTO: 37.5 %
HGB BLD-MCNC: 12.2 G/DL
IMM GRANULOCYTES # BLD AUTO: 0.02 X10(3) UL (ref 0–1)
IMM GRANULOCYTES NFR BLD: 0.2 %
LYMPHOCYTES # BLD AUTO: 2.1 X10(3) UL (ref 1–4)
LYMPHOCYTES NFR BLD AUTO: 25.2 %
MCH RBC QN AUTO: 29.5 PG (ref 26–34)
MCHC RBC AUTO-ENTMCNC: 32.5 G/DL (ref 31–37)
MCV RBC AUTO: 90.6 FL
MONOCYTES # BLD AUTO: 1.09 X10(3) UL (ref 0.1–1)
MONOCYTES NFR BLD AUTO: 13.1 %
NEUTROPHILS # BLD AUTO: 4.94 X10 (3) UL (ref 1.5–7.7)
NEUTROPHILS # BLD AUTO: 4.94 X10(3) UL (ref 1.5–7.7)
NEUTROPHILS NFR BLD AUTO: 59.3 %
OSMOLALITY SERPL CALC.SUM OF ELEC: 287 MOSM/KG (ref 275–295)
PLATELET # BLD AUTO: 194 10(3)UL (ref 150–450)
POTASSIUM SERPL-SCNC: 4.3 MMOL/L (ref 3.5–5.1)
PROT SERPL-MCNC: 7.6 G/DL (ref 5.7–8.2)
RBC # BLD AUTO: 4.14 X10(6)UL
SODIUM SERPL-SCNC: 138 MMOL/L (ref 136–145)
WBC # BLD AUTO: 8.3 X10(3) UL (ref 4–11)

## 2025-02-14 PROCEDURE — 85025 COMPLETE CBC W/AUTO DIFF WBC: CPT

## 2025-02-14 PROCEDURE — 74019 RADEX ABDOMEN 2 VIEWS: CPT

## 2025-02-14 PROCEDURE — 80053 COMPREHEN METABOLIC PANEL: CPT

## 2025-02-14 NOTE — PROGRESS NOTES
Subjective:   Deonna Vela is a 64 year old female who presents for ER F/U     Fever, body aches, chills since Monday. Went to ER on 25.   Work up was negative. CXR, UA/cx, viral swab were all done.     Is taking tylenol and that has been helping. Feels like her temp spikes when due for next dose. (.3)  No more vomiting since ER. Just once.   Since Wednesday, GI issues got worse.   Is having very soft stools that are yellow in color right now. Started in hospital.   Has not been eating much at all. Applesauce, bananas, bagel, yogurt.   Eats and then 2-3 hours later will have \"stool issues\".   Does regularly get constipated.   Still has gallbladder.   Has been getting headaches too.     History/Other:    Chief Complaint Reviewed and Verified  No Further Nursing Notes to   Review  Tobacco Reviewed  Allergies Reviewed  Medications Reviewed    Problem List Reviewed  Medical History Reviewed  Surgical History   Reviewed  Family History Reviewed         Tobacco:  She smoked tobacco in the past but quit greater than 12 months ago.  Social History     Tobacco Use   Smoking Status Former    Current packs/day: 0.00    Average packs/day: 1 pack/day for 12.0 years (12.0 ttl pk-yrs)    Types: Cigarettes    Start date: 1973    Quit date: 1985    Years since quittin.1   Smokeless Tobacco Never   Tobacco Comments    Updated 24        Current Outpatient Medications   Medication Sig Dispense Refill    acetaminophen 500 MG Oral Tab Take 1 tablet (500 mg total) by mouth every 6 (six) hours as needed for Pain.      sertraline 100 MG Oral Tab Take 2 tablets (200 mg total) by mouth daily. 180 tablet 1    Meloxicam 15 MG Oral Tab Take 1 tablet (15 mg total) by mouth daily. 90 tablet 0    valsartan 160 MG Oral Tab Take 1.5 tablets (240 mg total) by mouth daily. 135 tablet 1    traMADol 50 MG Oral Tab Take 1 tablet (50 mg total) by mouth in the morning and 1 tablet (50 mg total) before bedtime. 60  tablet 1    REPATHA SURECLICK 140 MG/ML Subcutaneous Solution Auto-injector Inject 1 mL into the skin every 14 (fourteen) days.      Omeprazole 40 MG Oral Capsule Delayed Release Take 1 capsule (40 mg total) by mouth in the morning and 1 capsule (40 mg total) before bedtime. 180 capsule 1    azelastine 0.1 % Nasal Solution 2 sprays by Nasal route 2 (two) times daily. 30 mL 0    triamcinolone 0.1 % External Cream Apply 1 Application topically 2 (two) times daily. Apply to scar twice daily 45 g 0    Multiple Vitamins-Minerals (ONE DAILY CALCIUM/IRON) Oral Tab Take 1 tablet by mouth daily.      Cholecalciferol (VITAMIN D) 25 MCG (1000 UT) Oral Tab Take 4,000 Units by mouth daily.      ALPRAZolam 0.25 MG Oral Tab Take 1 tablet (0.25 mg total) by mouth daily as needed (panic attacks). 15 tablet 0    Polyethylene Glycol 3350 17 g Oral Powd Pack Take 17 g by mouth daily as needed (constipation).      docusate sodium 100 MG Oral Cap Take 1 capsule (100 mg total) by mouth 2 (two) times daily.      omega-3 fatty acids 1000 MG Oral Cap Take 1,000 mg by mouth daily.      aspirin 81 MG Oral Chew Tab Chew 1 tablet (81 mg total) by mouth daily.      mometasone (NASONEX) 50 MCG/ACT Nasal Suspension 2 sprays by Nasal route as needed. 17 g 6    fexofenadine 180 MG Oral Tab Take 1 tablet (180 mg total) by mouth as needed. (Patient not taking: Reported on 2/12/2025)           Review of Systems:  Review of Systems   Constitutional:  Positive for fatigue.   Gastrointestinal:  Positive for abdominal pain.   All other systems reviewed and are negative.        Objective:   /78 (BP Location: Left arm, Patient Position: Sitting, Cuff Size: large)   Pulse 90   Resp 18   Ht 5' 6.5\" (1.689 m)   Wt 294 lb (133.4 kg)   LMP 05/15/2008   SpO2 97%   BMI 46.74 kg/m²  Estimated body mass index is 46.74 kg/m² as calculated from the following:    Height as of this encounter: 5' 6.5\" (1.689 m).    Weight as of this encounter: 294 lb (133.4  kg).  Physical Exam  Vitals reviewed.   Constitutional:       Appearance: Normal appearance.   HENT:      Head: Normocephalic and atraumatic.   Eyes:      General: No scleral icterus.        Right eye: No discharge.         Left eye: No discharge.      Conjunctiva/sclera: Conjunctivae normal.   Cardiovascular:      Rate and Rhythm: Normal rate and regular rhythm.      Pulses: Normal pulses.      Heart sounds: Normal heart sounds.   Abdominal:      Comments: Tender and feels bloated   Musculoskeletal:      Cervical back: Normal range of motion.   Skin:     General: Skin is warm and dry.   Neurological:      General: No focal deficit present.      Mental Status: She is alert and oriented to person, place, and time.   Psychiatric:         Mood and Affect: Mood normal.         Behavior: Behavior normal.         Thought Content: Thought content normal.         Judgment: Judgment normal.         Assessment & Plan:   1. Loose stools (Primary)  -     CBC With Differential With Platelet; Future; Expected date: 02/14/2025  -     Comp Metabolic Panel (14); Future; Expected date: 02/14/2025  -     Cancel: XR ABDOMEN 3 OR MORE VIEWS (CPT=74021); Future; Expected date: 02/14/2025  -     CBC With Differential With Platelet  -     Comp Metabolic Panel (14)  2. Change in stool habits  -     CBC With Differential With Platelet; Future; Expected date: 02/14/2025  -     Comp Metabolic Panel (14); Future; Expected date: 02/14/2025  -     Cancel: XR ABDOMEN 3 OR MORE VIEWS (CPT=74021); Future; Expected date: 02/14/2025  -     CBC With Differential With Platelet  -     Comp Metabolic Panel (14)  3. Bloated abdomen  -     CBC With Differential With Platelet; Future; Expected date: 02/14/2025  -     Comp Metabolic Panel (14); Future; Expected date: 02/14/2025  -     Cancel: XR ABDOMEN 3 OR MORE VIEWS (CPT=74021); Future; Expected date: 02/14/2025  -     CBC With Differential With Platelet  -     Comp Metabolic Panel (14)  4. Lower abdominal  pain  -     CBC With Differential With Platelet; Future; Expected date: 02/14/2025  -     Comp Metabolic Panel (14); Future; Expected date: 02/14/2025  -     Cancel: XR ABDOMEN 3 OR MORE VIEWS (CPT=74021); Future; Expected date: 02/14/2025  -     CBC With Differential With Platelet  -     Comp Metabolic Panel (14)  5. Fever, unspecified fever cause  -     CBC With Differential With Platelet; Future; Expected date: 02/14/2025  -     Comp Metabolic Panel (14); Future; Expected date: 02/14/2025  -     Cancel: XR ABDOMEN 3 OR MORE VIEWS (CPT=74021); Future; Expected date: 02/14/2025  -     CBC With Differential With Platelet  -     Comp Metabolic Panel (14)  Will repeat CBC/CMP and do an abd XR.   Pt to continue to push fluids, rest, and follow BRAT diet.       No follow-ups on file.    Ana Severino, NUSRAT, 2/14/2025, 9:33 AM

## 2025-02-17 ENCOUNTER — APPOINTMENT (OUTPATIENT)
Dept: PHYSICAL THERAPY | Facility: HOSPITAL | Age: 65
End: 2025-02-17
Attending: FAMILY MEDICINE
Payer: MEDICARE

## 2025-02-19 ENCOUNTER — APPOINTMENT (OUTPATIENT)
Dept: PHYSICAL THERAPY | Facility: HOSPITAL | Age: 65
End: 2025-02-19
Attending: FAMILY MEDICINE
Payer: MEDICARE

## 2025-02-25 ENCOUNTER — OFFICE VISIT (OUTPATIENT)
Dept: FAMILY MEDICINE CLINIC | Facility: CLINIC | Age: 65
End: 2025-02-25
Payer: MEDICARE

## 2025-02-25 VITALS
HEIGHT: 66.5 IN | DIASTOLIC BLOOD PRESSURE: 70 MMHG | RESPIRATION RATE: 18 BRPM | BODY MASS INDEX: 45.9 KG/M2 | SYSTOLIC BLOOD PRESSURE: 136 MMHG | WEIGHT: 289 LBS | HEART RATE: 66 BPM | OXYGEN SATURATION: 97 %

## 2025-02-25 DIAGNOSIS — I10 ESSENTIAL HYPERTENSION: Primary | ICD-10-CM

## 2025-02-25 DIAGNOSIS — Z79.899 MEDICATION MANAGEMENT: ICD-10-CM

## 2025-02-25 DIAGNOSIS — M16.12 PRIMARY OSTEOARTHRITIS OF LEFT HIP: ICD-10-CM

## 2025-02-25 DIAGNOSIS — Z63.79 STRESS DUE TO ILLNESS OF FAMILY MEMBER: ICD-10-CM

## 2025-02-25 DIAGNOSIS — E66.01 MORBID OBESITY WITH BMI OF 45.0-49.9, ADULT (HCC): ICD-10-CM

## 2025-02-25 DIAGNOSIS — F41.9 ANXIETY: ICD-10-CM

## 2025-02-25 DIAGNOSIS — Z12.11 SCREENING FOR COLON CANCER: ICD-10-CM

## 2025-02-25 DIAGNOSIS — E78.5 DYSLIPIDEMIA: ICD-10-CM

## 2025-02-25 DIAGNOSIS — F33.1 MODERATE EPISODE OF RECURRENT MAJOR DEPRESSIVE DISORDER (HCC): ICD-10-CM

## 2025-02-25 DIAGNOSIS — F33.0 MILD EPISODE OF RECURRENT MAJOR DEPRESSIVE DISORDER: ICD-10-CM

## 2025-02-25 DIAGNOSIS — F41.0 PANIC ATTACK: ICD-10-CM

## 2025-02-25 DIAGNOSIS — M25.552 LEFT HIP PAIN: ICD-10-CM

## 2025-02-25 DIAGNOSIS — N30.01 ACUTE CYSTITIS WITH HEMATURIA: ICD-10-CM

## 2025-02-25 LAB
ALBUMIN SERPL-MCNC: 4.6 G/DL (ref 3.2–4.8)
ALBUMIN/GLOB SERPL: 1.5 {RATIO} (ref 1–2)
ALP LIVER SERPL-CCNC: 90 U/L
ALT SERPL-CCNC: 14 U/L
ANION GAP SERPL CALC-SCNC: 8 MMOL/L (ref 0–18)
AST SERPL-CCNC: 23 U/L (ref ?–34)
BILIRUB SERPL-MCNC: 0.3 MG/DL (ref 0.2–1.1)
BUN BLD-MCNC: 19 MG/DL (ref 9–23)
CALCIUM BLD-MCNC: 10.4 MG/DL (ref 8.7–10.6)
CHLORIDE SERPL-SCNC: 104 MMOL/L (ref 98–112)
CHOLEST SERPL-MCNC: 248 MG/DL (ref ?–200)
CO2 SERPL-SCNC: 27 MMOL/L (ref 21–32)
CREAT BLD-MCNC: 1.19 MG/DL
EGFRCR SERPLBLD CKD-EPI 2021: 51 ML/MIN/1.73M2 (ref 60–?)
FASTING PATIENT LIPID ANSWER: NO
FASTING STATUS PATIENT QL REPORTED: NO
GLOBULIN PLAS-MCNC: 3.1 G/DL (ref 2–3.5)
GLUCOSE BLD-MCNC: 108 MG/DL (ref 70–99)
HDLC SERPL-MCNC: 43 MG/DL (ref 40–59)
LDLC SERPL CALC-MCNC: 169 MG/DL (ref ?–100)
NONHDLC SERPL-MCNC: 205 MG/DL (ref ?–130)
OSMOLALITY SERPL CALC.SUM OF ELEC: 291 MOSM/KG (ref 275–295)
POTASSIUM SERPL-SCNC: 4.4 MMOL/L (ref 3.5–5.1)
PROT SERPL-MCNC: 7.7 G/DL (ref 5.7–8.2)
SODIUM SERPL-SCNC: 139 MMOL/L (ref 136–145)
TRIGL SERPL-MCNC: 196 MG/DL (ref 30–149)
VLDLC SERPL CALC-MCNC: 39 MG/DL (ref 0–30)

## 2025-02-25 PROCEDURE — 99499 UNLISTED E&M SERVICE: CPT | Performed by: FAMILY MEDICINE

## 2025-02-25 PROCEDURE — 80053 COMPREHEN METABOLIC PANEL: CPT | Performed by: FAMILY MEDICINE

## 2025-02-25 PROCEDURE — 80061 LIPID PANEL: CPT | Performed by: FAMILY MEDICINE

## 2025-02-25 PROCEDURE — 99214 OFFICE O/P EST MOD 30 MIN: CPT | Performed by: FAMILY MEDICINE

## 2025-02-25 RX ORDER — MELOXICAM 15 MG/1
15 TABLET ORAL DAILY
Qty: 90 TABLET | Refills: 1 | Status: SHIPPED | OUTPATIENT
Start: 2025-02-25

## 2025-02-25 RX ORDER — SERTRALINE HYDROCHLORIDE 100 MG/1
200 TABLET, FILM COATED ORAL DAILY
Qty: 180 TABLET | Refills: 1 | Status: SHIPPED | OUTPATIENT
Start: 2025-02-25

## 2025-02-25 RX ORDER — EVOLOCUMAB 140 MG/ML
1 INJECTION, SOLUTION SUBCUTANEOUS
Qty: 2.1 ML | Refills: 1 | Status: SHIPPED | OUTPATIENT
Start: 2025-02-25

## 2025-02-25 RX ORDER — OMEPRAZOLE 40 MG/1
40 CAPSULE, DELAYED RELEASE ORAL 2 TIMES DAILY
Qty: 180 CAPSULE | Refills: 1 | Status: SHIPPED | OUTPATIENT
Start: 2025-02-25

## 2025-02-25 RX ORDER — TRAMADOL HYDROCHLORIDE 50 MG/1
50 TABLET ORAL 2 TIMES DAILY
Qty: 180 TABLET | Refills: 1 | Status: SHIPPED | OUTPATIENT
Start: 2025-02-25

## 2025-02-25 RX ORDER — VALSARTAN 160 MG/1
240 TABLET ORAL DAILY
Qty: 135 TABLET | Refills: 1 | Status: CANCELLED | OUTPATIENT
Start: 2025-02-25

## 2025-02-25 RX ORDER — ALPRAZOLAM 0.25 MG
0.25 TABLET ORAL DAILY PRN
Qty: 30 TABLET | Refills: 1 | Status: SHIPPED | OUTPATIENT
Start: 2025-02-25

## 2025-02-25 RX ORDER — VALSARTAN 80 MG/1
240 TABLET ORAL DAILY
Qty: 270 TABLET | Refills: 1 | Status: SHIPPED | OUTPATIENT
Start: 2025-02-25

## 2025-02-25 RX ORDER — BUPROPION HYDROCHLORIDE 150 MG/1
150 TABLET ORAL DAILY
Qty: 30 TABLET | Refills: 3 | Status: SHIPPED | OUTPATIENT
Start: 2025-02-25

## 2025-02-25 NOTE — PROGRESS NOTES
Deonna Vela is a 64 year old female.  HPI:   Pt is here for a med check and left hip pain.  [facility] will do left hip replacement at BMI of 40.  Recent imaging of her abdomen showed worsening of her left hip.  She will see if [facility] will change her mind about sx.    She states her BP has been high but she is in chronic pain.  She may go to [facility] to evaluate her hip since they will do surgery at higher BMI's.  Prediabetes -- better at 5.8   HTN -- stable  CAD/dyslipidemia -- stable; saw Dr. Ballesteros and doing well  GERD -- stable  Vitamin d def -- stable  Depression -- on sertraline 200 mg and feels it helped; no suicidal thoughts  Speaking with Antonietta.  No suicidal thoughts.    Medications reviewed.      sertraline 100 MG Oral Tab, Take 1.5 tablets (150 mg total) by mouth daily., Disp: 135 tablet, Rfl: 0  Omeprazole 40 MG Oral Capsule Delayed Release, Take 1 capsule (40 mg total) by mouth daily., Disp: 90 capsule, Rfl: 1  valsartan 160 MG Oral Tab, Take 1 tablet (160 mg total) by mouth daily., Disp: 30 tablet, Rfl: 2  Cholecalciferol (VITAMIN D) 25 MCG (1000 UT) Oral Tab, Take 4,000 Units by mouth daily., Disp: , Rfl:   ALPRAZolam 0.25 MG Oral Tab, Take 1 tablet (0.25 mg total) by mouth daily as needed (panic attacks)., Disp: 15 tablet, Rfl: 0  Polyethylene Glycol 3350 17 g Oral Powd Pack, Take 17 g by mouth daily as needed (constipation)., Disp: , Rfl:   docusate sodium 100 MG Oral Cap, Take 100 mg by mouth 2 (two) times daily.  , Disp: , Rfl:   omega-3 fatty acids 1000 MG Oral Cap, Take 1,000 mg by mouth daily., Disp: , Rfl:   Fexofenadine HCl (ALLEGRA OR), Take 1 tablet by mouth as needed., Disp: , Rfl:   Multiple Vitamin (MULTI-VITAMIN DAILY OR), Take 1 tablet by mouth daily.  , Disp: , Rfl:   aspirin 81 MG Oral Chew Tab, Chew 81 mg by mouth daily., Disp: , Rfl:   mometasone (NASONEX) 50 MCG/ACT Nasal Suspension, 2 sprays by Nasal route as needed., Disp: 17 g, Rfl: 6    No current facility-administered  medications for this visit.     Allergies   Allergen Reactions    Adhesive Tape      Adhesive Bandages      Dander      Cats      Seasonal OTHER (SEE COMMENTS)     Watery eyes & stuffiness      Past Medical History:    Abdominal pain    lower left    Anxiety    Bloating    Constipation    Dyspareunia    Fibroids    Frequent urination    OAB  Dr. Jiang    Heartburn    carlton/caffeine triggered    Hemorrhoids    High cholesterol    History of depression    Irregular bowel habits    Leaking of urine    Mixed hyperlipidemia    Neoplasm    vaginal wall    Noncompliance    Obesity    Pain in joints    left knee    PMDD (premenstrual dysphoric disorder)    PONV (postoperative nausea and vomiting)    Visual impairment    glasses prn    Vitamin D deficiency    Wears glasses    cataract surgery right eye       Social History:  Social History     Socioeconomic History    Marital status:    Tobacco Use    Smoking status: Former     Current packs/day: 0.00     Average packs/day: 1 pack/day for 12.0 years (12.0 ttl pk-yrs)     Types: Cigarettes     Start date: 1973     Quit date: 1985     Years since quittin.1    Smokeless tobacco: Never    Tobacco comments:     Updated 24   Vaping Use    Vaping status: Never Used   Substance and Sexual Activity    Alcohol use: Yes     Comment: 1yr if that, never a problem    Drug use: No    Sexual activity: Yes     Partners: Male   Other Topics Concern    Exercise Yes     Social Drivers of Health     Food Insecurity: No Food Insecurity (2023)    Received from [facility]    Hunger Vital Sign     Worried About Running Out of Food in the Last Year: Never true     Ran Out of Food in the Last Year: Never true   Transportation Needs: No Transportation Needs (2023)    Received from [facility]    PRAPARE - Transportation     Lack of Transportation (Medical): No     Lack of Transportation (Non-Medical): No   Stress: Stress Concern Present  (1/17/2023)    Received from [facility], [facility]    [employer] - Occupational Stress Questionnaire     Feeling of Stress : Rather much   Housing Stability: Low Risk  (1/17/2023)    Received from [facility]    Housing Stability Vital Sign     Unable to Pay for Housing in the Last Year: No     Number of Places Lived in the Last Year: 2     Unstable Housing in the Last Year: No        Results for orders placed or performed in visit on 02/14/25   CBC W Differential W Platelet [E]    Collection Time: 02/14/25 10:23 AM   Result Value Ref Range    WBC 8.3 4.0 - 11.0 x10(3) uL    RBC 4.14 3.80 - 5.30 x10(6)uL    HGB 12.2 12.0 - 16.0 g/dL    HCT 37.5 35.0 - 48.0 %    .0 150.0 - 450.0 10(3)uL    MCV 90.6 80.0 - 100.0 fL    MCH 29.5 26.0 - 34.0 pg    MCHC 32.5 31.0 - 37.0 g/dL    RDW 14.5 %    Neutrophil Absolute Prelim 4.94 1.50 - 7.70 x10 (3) uL    Neutrophil Absolute 4.94 1.50 - 7.70 x10(3) uL    Lymphocyte Absolute 2.10 1.00 - 4.00 x10(3) uL    Monocyte Absolute 1.09 (H) 0.10 - 1.00 x10(3) uL    Eosinophil Absolute 0.17 0.00 - 0.70 x10(3) uL    Basophil Absolute 0.02 0.00 - 0.20 x10(3) uL    Immature Granulocyte Absolute 0.02 0.00 - 1.00 x10(3) uL    Neutrophil % 59.3 %    Lymphocyte % 25.2 %    Monocyte % 13.1 %    Eosinophil % 2.0 %    Basophil % 0.2 %    Immature Granulocyte % 0.2 %   Comp Metabolic Panel (14) [E]    Collection Time: 02/14/25 10:23 AM   Result Value Ref Range    Glucose 104 (H) 70 - 99 mg/dL    Sodium 138 136 - 145 mmol/L    Potassium 4.3 3.5 - 5.1 mmol/L    Chloride 102 98 - 112 mmol/L    CO2 23.0 21.0 - 32.0 mmol/L    Anion Gap 13 0 - 18 mmol/L    BUN 14 9 - 23 mg/dL    Creatinine 1.20 (H) 0.55 - 1.02 mg/dL    Calcium, Total 10.2 8.7 - 10.6 mg/dL    Calculated Osmolality 287 275 - 295 mOsm/kg    eGFR-Cr 50 (L) >=60 mL/min/1.73m2    AST 25 <34 U/L    ALT 23 10 - 49 U/L    Alkaline Phosphatase 98 50 - 130 U/L    Bilirubin, Total 0.4 0.2 - 1.1 mg/dL     Total Protein 7.6 5.7 - 8.2 g/dL    Albumin 4.5 3.2 - 4.8 g/dL    Globulin  3.1 2.0 - 3.5 g/dL    A/G Ratio 1.5 1.0 - 2.0    Patient Fasting for CMP? No        REVIEW OF SYSTEMS:   GENERAL: feels well otherwise  SKIN: denies any unusual skin lesions; lump on left thigh at hip region; has been doing dry needling  HEENT: fullness in her neck even before neck sx and has not changed  LUNGS: denies shortness of breath with exertion  CARDIOVASCULAR: denies chest pain on exertion  GI: denies abdominal pain,denies heartburn  MUSCULOSKELETAL: denies back pain; left hip pain  EXTREMITIES:  No pain or numbness    EXAM:   /70   Pulse 66   Resp 18   Ht 5' 6.5\" (1.689 m)   Wt 289 lb (131.1 kg)   LMP 05/15/2008   SpO2 97%   BMI 45.95 kg/m²   GENERAL: well developed, well nourished,in no apparent distress  SKIN: no rashes,no suspicious lesions  HEENT: atraumatic, normocephalic  NECK: supple,no adenopathy,no bruits  LUNGS: clear to auscultation bilaterally  CARDIO: RRR without murmur  GI: soft, good BS's; no HSM; no epigastric tenderness  EXTREMITIES: no cyanosis, clubbing or edema; wearing compression stockings.     ASSESSMENT AND PLAN:     Encounter Diagnoses   Name Primary?    Essential hypertension Yes    Dyslipidemia     Left hip pain     Mild episode of recurrent major depressive disorder     Primary osteoarthritis of left hip     Acute cystitis with hematuria     Panic attack     Anxiety     Stress due to illness of family member     Moderate episode of recurrent major depressive disorder (HCC)     Morbid obesity with BMI of 45.0-49.9, adult (HCC)     Screening for colon cancer     Medication management        Orders Placed This Encounter   Procedures    Comp Metabolic Panel (14)    Lipid Panel       Meds & Refills for this Visit:  Requested Prescriptions     Signed Prescriptions Disp Refills    ALPRAZolam 0.25 MG Oral Tab 30 tablet 1     Sig: Take 1 tablet (0.25 mg total) by mouth daily as needed (panic  attacks).    Meloxicam 15 MG Oral Tab 90 tablet 1     Sig: Take 1 tablet (15 mg total) by mouth daily.    Omeprazole 40 MG Oral Capsule Delayed Release 180 capsule 1     Sig: Take 1 capsule (40 mg total) by mouth in the morning and 1 capsule (40 mg total) before bedtime.    REPATHA SURECLICK 140 MG/ML Subcutaneous Solution Auto-injector 2.1 mL 1     Sig: Inject 1 mL into the skin every 14 (fourteen) days.    sertraline 100 MG Oral Tab 180 tablet 1     Sig: Take 2 tablets (200 mg total) by mouth daily.    traMADol 50 MG Oral Tab 180 tablet 1     Sig: Take 1 tablet (50 mg total) by mouth in the morning and 1 tablet (50 mg total) before bedtime.    valsartan 80 MG Oral Tab 270 tablet 1     Sig: Take 3 tablets (240 mg total) by mouth daily.    buPROPion  MG Oral Tablet 24 Hr 30 tablet 3     Sig: Take 1 tablet (150 mg total) by mouth daily.       Imaging & Consults:  GASTRO - INTERNAL     Labs reviewed.    Prediabetes -- labs ordered for 3 months  Dyslipidemia/CAD -- on repatha and doing well  -- repeat CMP and lipids today  Allergies -- stable  GERD -- omeprazole to 40 mg twice a day and monitor with meloxicam since notes discomfort at times  Morbid obesity -- focus on diet and exercise  Left hip OA and pain  -- saw [facility] and will do sx at BMI 40; will update them with worsening imaging of left hip  Hypertension -  On valsartan  -- increase from 160 to 240 mg and monitor BP.  CAD -- stable; saw Dr. Ballesteros and due to see him yearly  Depression-stable on sertraline; continue therapy with Antonietta; advised to add wellbutrin  mg daily    Vaccines UTD.  Mammo UTD.  Dexa pending.        The patient indicates understanding of these issues and agrees to the plan.  Return in about 7 weeks (around 4/15/2025) for med check 30, weight check.

## 2025-02-26 ENCOUNTER — APPOINTMENT (OUTPATIENT)
Dept: PHYSICAL THERAPY | Facility: HOSPITAL | Age: 65
End: 2025-02-26
Attending: FAMILY MEDICINE
Payer: MEDICARE

## 2025-02-28 ENCOUNTER — OFFICE VISIT (OUTPATIENT)
Dept: PHYSICAL THERAPY | Facility: HOSPITAL | Age: 65
End: 2025-02-28
Attending: FAMILY MEDICINE
Payer: MEDICARE

## 2025-02-28 PROCEDURE — 97110 THERAPEUTIC EXERCISES: CPT

## 2025-02-28 NOTE — PROGRESS NOTES
Diagnosis:   Left hip pain (M25.552)  Primary osteoarthritis of left hip (M16.12)      Referring Provider: No ref. provider found  Date of Evaluation:    1/15/2025    Precautions:  None Next MD visit:   none scheduled  Date of Surgery: n/a   Insurance Primary/Secondary: MEDICARE / COMMERCIAL     # Auth Visits: POC 12 visits           Progress Summary  Pt has attended 5 visits in Physical Therapy.     Subjective: Pt was quite sick forcing her to miss therapy. Pt had to rest and not do exercises which has actually made the pain a lot better. Still some pain but a lot less severe than previously. Has not been doing exercise due to ongoing recovery from illness. Feels the most weakness in the front of the leg.     Pain: \"my normal pain\"    Objective:   AROM: (* denotes performed with pain)  Hip Knee Foot/Ankle   Flexion: R 105; L 80  Extension: R 5; L 0*  Abduction: R 35; L 30  ER: R 40, tightness; L 20* gluteal pain  IR: R 41; L 30  Flexion: R NL; L NL  Extension: R NL; L NL    Not tested      Flexibility:  Hamstrings: 90 deg bilat     Strength/MMT: (* denotes performed with pain)   Hip Knee Foot/Ankle   Flexion: R 4+; L 4  Extension: R 5/5; L 4-/5  Abduction: R 4/5; L 4/5 Flexion: R 5/5; L 5/5  Extension: R 5/5; L 5/5    DF: R 5/5; L 5/5  PF: R 5/5; L 5/5        Gait: antalgia LLE and heavy off loading of LLE onto cane while in single limb stance during gait.     Assessment: Pt attends therapy session after prolonged break secondary to illness. Pt subjectively reports some improvements in symptoms from having to rest more than normal. Reassessment above in objective shows improved ER/IR AROM of bilateral hips, improved hamstring length bilaterally, and gains made in hip strength. Pt continues to have ongoing hip flexion limitations, however will hopefully be soon having hip replacement. Assigned pt updated HEP for supine strengthening to reduce excess loading of the L hip joint thus leading to more pain. Due to  ongoing strength and range of motion deficits, pt would benefit from continued skilled intervention to achieve goals set for therapy.       Goals:    (to be met in 12 visits)    Not Met Progress Toward Partially Met Met   Pt will have improved hip AROM Flex to 110 deg to be able to don/doff shoes and perform sit to stand transfers without difficulty. []  [x]  []  []    Pt will have improved hip AROM ABD to 35 deg to be able perform bed mobility and perform car transfers without difficulty. []  [x]  []  []    Pt will improve hip ABD and ER strength to 4+/5 to increase ease with standing and walking. []  [x]  []  []    Pt will be able to squat to  light objects around the house with <2/10 hip pain. []  [x]  []  []    Pt will improve functional hip strength to report ability to ascend/descend 1 flight of stairs reciprocally without use of handrail. []  [x]  []  []    Pt will demonstrate improved SLS to >10 seconds MARTIN to promote safety and decrease risk of falls on uneven surfaces such as grass and gravel. []  [x]  []  []    Pt will be independent and compliant with comprehensive HEP to maintain progress achieved in PT. []  [x]  []  []         Plan: Continue skilled Physical Therapy 1 x/week or a total of 5 additional visits over a 90 day period.      Patient/Family/Caregiver was advised of these findings, precautions, and treatment options and has agreed to actively participate in planning and for this course of care.    Thank you for your referral. If you have any questions, please contact me at Dept: [phone].    Sincerely,  Electronically signed by therapist: Catrachita Carey PT, DPT    Certification From: 2/28/2025  To:5/29/2025     Date: 1/22/2025  TX#: 2/12 Date:1/27/2025                 TX#: 3/12 Date:1/29/2025                 TX#: 4/12 Date: 2/28/2025            TX#: 5/ Date:   Tx#: 6/   Manual Therapy  Inferior glide on left hip  Lateral distraction grade III-IV on L hip  Lateral distraction MWM into  flexion on L  Manual Therapy 25 min  Inferior glide on left hip  Lateral distraction grade III-IV on L hip  Lateral distraction MWM into flexion on L   STM to left gluteal region  Manual Therapy 25 min  Inferior glide on left hip  Lateral distraction grade III-IV on L hip  Lateral distraction MWM into flexion on L   STM to left gluteal region      Therex  Hooklying clamshells RTB x 10  Bent knee fallouts with RTB x 10 ea leg  SL hip abduction (d/c due to pain)  Self hooklying traction  TherEx 20 min  Supine bridge 2 x 10  Hooklying clamshells RTB x 10  Bent knee fallouts with RTB x 10 ea leg  Standing hip abduction on LLE only 2 x 8  Step ups LLE on 2 inch step 2 x 10  Step ups RLE on 4 inch step 2 x 10  TherEx  Supine bridge 2 x 10   Hooklying clamshells RTB 2 x 10  Bent knee fallouts with RTB x 10 ea leg  Sit to stands x 8   LAQ x 10  TE 38 min  Reassessment in objective  Bridges, 2x8   Hooklying clamshells, green band, 2x12  Hooklying march w/ green band, 2x10   LTR, x10 bilat  Nustep, level 3, 6 min  Pt education: updated HEP, POC moving forward and reschedule appts.                   HEP: Access Code: [email or web address]  URL: [email or web address]  Date: 01/29/2025  Prepared by: Kei Miller    Exercises  - Clamshell with Resistance  - 2 x daily - 7 x weekly - 2 sets - 15 reps  - Supine Bridge with Resistance Band  - 2 x daily - 7 x weekly - 2 sets - 15 reps  - Sidelying Hip Abduction  - 2 x daily - 7 x weekly - 2 sets - 15 reps  - Seated Piriformis Stretch with Trunk Bend  - 2 x daily - 7 x weekly - 1 sets - 6 reps - 30 hold  - Supine Hip Abduction  - 2 x daily - 7 x weekly - 2 sets - 10 reps  - Hooklying Lumbar Traction  - 2 x daily - 7 x weekly - 1 sets - 3 reps - 30 hold  - Sitting Knee Extension with Resistance  - 2 x daily - 7 x weekly - 2 sets - 10 reps  - standing hip abduction 2 x 10     Access Code: [email or web address]  URL: [email or web address]  Date: 02/28/2025  Prepared by:  Catrachita Hedin    Exercises  - Supine Bridge  - 1 x daily - 7 x weekly - 2-3 sets - 10 reps  - Supine Lower Trunk Rotation  - 1 x daily - 7 x weekly - 2 sets - 10 reps  - Hooklying Clamshell with Resistance  - 1 x daily - 7 x weekly - 2 sets - 10 reps  - Supine March with Resistance Band  - 1 x daily - 7 x weekly - 2 sets - 10 reps    Charges: TE 3     Total Timed Treatment: 38 min  Total Treatment Time: 38 min

## 2025-03-06 ENCOUNTER — OFFICE VISIT (OUTPATIENT)
Dept: PHYSICAL THERAPY | Facility: HOSPITAL | Age: 65
End: 2025-03-06
Attending: FAMILY MEDICINE
Payer: MEDICARE

## 2025-03-06 PROCEDURE — 97110 THERAPEUTIC EXERCISES: CPT

## 2025-03-06 PROCEDURE — 97140 MANUAL THERAPY 1/> REGIONS: CPT

## 2025-03-06 NOTE — PROGRESS NOTES
Diagnosis:   Left hip pain (M25.552)  Primary osteoarthritis of left hip (M16.12)      Referring Provider: No ref. provider found  Date of Evaluation:    1/15/2025    Precautions:  None Next MD visit:   none scheduled  Date of Surgery: n/a   Insurance Primary/Secondary: MEDICARE / COMMERCIAL     # Auth Visits: POC 12 visits            Subjective: Pt reports she has been able to do new exercises at home. Has transitioned to about every other day for exercises due to excess soreness.     Pain: \"my normal pain\"    Objective:   AROM: (* denotes performed with pain)  Hip Knee Foot/Ankle   Flexion: R 105; L 80  Extension: R 5; L 0*  Abduction: R 35; L 30  ER: R 40, tightness; L 20* gluteal pain  IR: R 41; L 30  Flexion: R NL; L NL  Extension: R NL; L NL    Not tested      Flexibility:  Hamstrings: 90 deg bilat     Strength/MMT: (* denotes performed with pain)   Hip Knee Foot/Ankle   Flexion: R 4+; L 4  Extension: R 5/5; L 4-/5  Abduction: R 4/5; L 4/5 Flexion: R 5/5; L 5/5  Extension: R 5/5; L 5/5    DF: R 5/5; L 5/5  PF: R 5/5; L 5/5        Gait: antalgia LLE and heavy off loading of LLE onto cane while in single limb stance during gait.     Assessment: Pt able to participate in supine and standing activities for functional hip strengthening, rest breaks provided throughout. Pt subjectively reports increased hip pain with side stepping and retro walking in // bars. Cues to shorten steps as needed to reduce strain on hip joint. Pt reports most relief with inferior hip glide, focused manual intervention with this for pain relief.       Goals:    (to be met in 12 visits)    Not Met Progress Toward Partially Met Met   Pt will have improved hip AROM Flex to 110 deg to be able to don/doff shoes and perform sit to stand transfers without difficulty. []  [x]  []  []    Pt will have improved hip AROM ABD to 35 deg to be able perform bed mobility and perform car transfers without difficulty. []  [x]  []  []    Pt will improve hip  ABD and ER strength to 4+/5 to increase ease with standing and walking. []  [x]  []  []    Pt will be able to squat to  light objects around the house with <2/10 hip pain. []  [x]  []  []    Pt will improve functional hip strength to report ability to ascend/descend 1 flight of stairs reciprocally without use of handrail. []  [x]  []  []    Pt will demonstrate improved SLS to >10 seconds MARTIN to promote safety and decrease risk of falls on uneven surfaces such as grass and gravel. []  [x]  []  []    Pt will be independent and compliant with comprehensive HEP to maintain progress achieved in PT. []  [x]  []  []         Plan: Continue per plan of care.     Date: 1/22/2025  TX#: 2/12 Date:1/27/2025                 TX#: 3/12 Date:1/29/2025                 TX#: 4/12 Date: 2/28/2025            TX#: 5/ Date: 3/6/2025  Tx#: 6/10   Manual Therapy  Inferior glide on left hip  Lateral distraction grade III-IV on L hip  Lateral distraction MWM into flexion on L  Manual Therapy 25 min  Inferior glide on left hip  Lateral distraction grade III-IV on L hip  Lateral distraction MWM into flexion on L   STM to left gluteal region  Manual Therapy 25 min  Inferior glide on left hip  Lateral distraction grade III-IV on L hip  Lateral distraction MWM into flexion on L   STM to left gluteal region   Manual: 15 min  Lateral/inferior hip glides w/ belt, multiple bouts each way  STM to L glutes with foam roller   Therex  Hooklying clamshells RTB x 10  Bent knee fallouts with RTB x 10 ea leg  SL hip abduction (d/c due to pain)  Self hooklying traction  TherEx 20 min  Supine bridge 2 x 10  Hooklying clamshells RTB x 10  Bent knee fallouts with RTB x 10 ea leg  Standing hip abduction on LLE only 2 x 8  Step ups LLE on 2 inch step 2 x 10  Step ups RLE on 4 inch step 2 x 10  TherEx  Supine bridge 2 x 10   Hooklying clamshells RTB 2 x 10  Bent knee fallouts with RTB x 10 ea leg  Sit to stands x 8   LAQ x 10  TE 38 min  Reassessment in  objective  Bridges, 2x8   Hooklying clamshells, green band, 2x12  Hooklying march w/ green band, 2x10   LTR, x10 bilat  Nustep, level 3, 6 min  Pt education: updated HEP, POC moving forward and reschedule appts.  Therex: 25  Nustep, level 5, 6 min   LTR x10 bilat  DKTC, 2x10   Bridges, 2x8   Side stepping in bar, 2 laps   Fwd/retro walk in // bars, 2 laps  Seated adductor ball squeezes, x15 5 sec holds                 HEP: Access Code: [email or web address]  URL: [email or web address]  Date: 01/29/2025  Prepared by: Kei Miller    Exercises  - Clamshell with Resistance  - 2 x daily - 7 x weekly - 2 sets - 15 reps  - Supine Bridge with Resistance Band  - 2 x daily - 7 x weekly - 2 sets - 15 reps  - Sidelying Hip Abduction  - 2 x daily - 7 x weekly - 2 sets - 15 reps  - Seated Piriformis Stretch with Trunk Bend  - 2 x daily - 7 x weekly - 1 sets - 6 reps - 30 hold  - Supine Hip Abduction  - 2 x daily - 7 x weekly - 2 sets - 10 reps  - Hooklying Lumbar Traction  - 2 x daily - 7 x weekly - 1 sets - 3 reps - 30 hold  - Sitting Knee Extension with Resistance  - 2 x daily - 7 x weekly - 2 sets - 10 reps  - standing hip abduction 2 x 10     Access Code: [email or web address]  URL: [email or web address]  Date: 02/28/2025  Prepared by: Catrachita Carey    Exercises  - Supine Bridge  - 1 x daily - 7 x weekly - 2-3 sets - 10 reps  - Supine Lower Trunk Rotation  - 1 x daily - 7 x weekly - 2 sets - 10 reps  - Hooklying Clamshell with Resistance  - 1 x daily - 7 x weekly - 2 sets - 10 reps  - Supine March with Resistance Band  - 1 x daily - 7 x weekly - 2 sets - 10 reps    Charges: TE 2 MT 1    Total Timed Treatment: 40 min  Total Treatment Time: 40 min

## 2025-03-19 ENCOUNTER — OFFICE VISIT (OUTPATIENT)
Dept: PHYSICAL THERAPY | Facility: HOSPITAL | Age: 65
End: 2025-03-19
Attending: FAMILY MEDICINE
Payer: MEDICARE

## 2025-03-19 PROCEDURE — 97140 MANUAL THERAPY 1/> REGIONS: CPT

## 2025-03-19 PROCEDURE — 97110 THERAPEUTIC EXERCISES: CPT

## 2025-03-19 NOTE — PROGRESS NOTES
Diagnosis:   Left hip pain (M25.552)  Primary osteoarthritis of left hip (M16.12)      Referring Provider: No ref. provider found  Date of Evaluation:    1/15/2025    Precautions:  None Next MD visit:   none scheduled  Date of Surgery: n/a   Insurance Primary/Secondary: MEDICARE / COMMERCIAL     # Auth Visits: POC 12 visits            Subjective: Pt doing well today, she was able to get a bike this weekend similar to a Nustep which will be helpful.     Pain: \"my normal pain\"    Objective:   AROM: (* denotes performed with pain)  Hip Knee Foot/Ankle   Flexion: R 105; L 80  Extension: R 5; L 0*  Abduction: R 35; L 30  ER: R 40, tightness; L 20* gluteal pain  IR: R 41; L 30  Flexion: R NL; L NL  Extension: R NL; L NL    Not tested      Flexibility:  Hamstrings: 90 deg bilat     Strength/MMT: (* denotes performed with pain)   Hip Knee Foot/Ankle   Flexion: R 4+; L 4  Extension: R 5/5; L 4-/5  Abduction: R 4/5; L 4/5 Flexion: R 5/5; L 5/5  Extension: R 5/5; L 5/5    DF: R 5/5; L 5/5  PF: R 5/5; L 5/5        Gait: antalgia LLE and heavy off loading of LLE onto cane while in single limb stance during gait.     Assessment: Pt has good tolerance for session today with increased standing activities completed in // bars. Rest breaks taken as needed to reduce hip pain throughout activities. Utilized sand dune to increase challenge with BLE stability. Pt able to complete alternating cone taps, some difficulty and pain with hip flexion on LLE, however able to complete 2 sets of 30 sec each. Ended session with manual intervention to optimize pain relief felt from hip glides.       Goals:    (to be met in 12 visits)    Not Met Progress Toward Partially Met Met   Pt will have improved hip AROM Flex to 110 deg to be able to don/doff shoes and perform sit to stand transfers without difficulty. []  [x]  []  []    Pt will have improved hip AROM ABD to 35 deg to be able perform bed mobility and perform car transfers without difficulty.  []  [x]  []  []    Pt will improve hip ABD and ER strength to 4+/5 to increase ease with standing and walking. []  [x]  []  []    Pt will be able to squat to  light objects around the house with <2/10 hip pain. []  [x]  []  []    Pt will improve functional hip strength to report ability to ascend/descend 1 flight of stairs reciprocally without use of handrail. []  [x]  []  []    Pt will demonstrate improved SLS to >10 seconds MARTIN to promote safety and decrease risk of falls on uneven surfaces such as grass and gravel. []  [x]  []  []    Pt will be independent and compliant with comprehensive HEP to maintain progress achieved in PT. []  [x]  []  []         Plan: Continue per plan of care.     Date: 1/22/2025  TX#: 2/12 Date:1/27/2025                 TX#: 3/12 Date:1/29/2025                 TX#: 4/12 Date: 2/28/2025            TX#: 5/ Date: 3/6/2025  Tx#: 6/10 Date: 3/19/2025  Tx#: 7/10   Manual Therapy  Inferior glide on left hip  Lateral distraction grade III-IV on L hip  Lateral distraction MWM into flexion on L  Manual Therapy 25 min  Inferior glide on left hip  Lateral distraction grade III-IV on L hip  Lateral distraction MWM into flexion on L   STM to left gluteal region  Manual Therapy 25 min  Inferior glide on left hip  Lateral distraction grade III-IV on L hip  Lateral distraction MWM into flexion on L   STM to left gluteal region   Manual: 15 min  Lateral/inferior hip glides w/ belt, multiple bouts each way  STM to L glutes with foam roller Manual: 15 min  Lateral/inferior hip glides w/ belt, multiple bouts each way  STM to L hip flexors and quad with foam roller   Therex  Hooklying clamshells RTB x 10  Bent knee fallouts with RTB x 10 ea leg  SL hip abduction (d/c due to pain)  Self hooklying traction  TherEx 20 min  Supine bridge 2 x 10  Hooklying clamshells RTB x 10  Bent knee fallouts with RTB x 10 ea leg  Standing hip abduction on LLE only 2 x 8  Step ups LLE on 2 inch step 2 x 10  Step ups RLE  on 4 inch step 2 x 10  TherEx  Supine bridge 2 x 10   Hooklying clamshells RTB 2 x 10  Bent knee fallouts with RTB x 10 ea leg  Sit to stands x 8   LAQ x 10  TE 38 min  Reassessment in objective  Bridges, 2x8   Hooklying clamshells, green band, 2x12  Hooklying march w/ green band, 2x10   LTR, x10 bilat  Nustep, level 3, 6 min  Pt education: updated HEP, POC moving forward and reschedule appts.  Therex: 25  Nustep, level 5, 6 min   LTR x10 bilat  DKTC, 2x10   Bridges, 2x8   Side stepping in bar, 2 laps   Fwd/retro walk in // bars, 2 laps  Seated adductor ball squeezes, x15 5 sec holds Therex: 25 min  Nustep, level 5, 6 min   Step ups to sand dune, 2x10   Sand dune, heel raises, 2x10   Side stepping in bar, 2 laps, yellow band  Alternating cone taps, BUE support, 2x30 sec   LTR x10 bilat                       HEP: Access Code: [email or web address]  URL: [email or web address]  Date: 01/29/2025  Prepared by: Kei Miller    Exercises  - Clamshell with Resistance  - 2 x daily - 7 x weekly - 2 sets - 15 reps  - Supine Bridge with Resistance Band  - 2 x daily - 7 x weekly - 2 sets - 15 reps  - Sidelying Hip Abduction  - 2 x daily - 7 x weekly - 2 sets - 15 reps  - Seated Piriformis Stretch with Trunk Bend  - 2 x daily - 7 x weekly - 1 sets - 6 reps - 30 hold  - Supine Hip Abduction  - 2 x daily - 7 x weekly - 2 sets - 10 reps  - Hooklying Lumbar Traction  - 2 x daily - 7 x weekly - 1 sets - 3 reps - 30 hold  - Sitting Knee Extension with Resistance  - 2 x daily - 7 x weekly - 2 sets - 10 reps  - standing hip abduction 2 x 10     Access Code: [email or web address]  URL: [email or web address]  Date: 02/28/2025  Prepared by: Catrachita Carey    Exercises  - Supine Bridge  - 1 x daily - 7 x weekly - 2-3 sets - 10 reps  - Supine Lower Trunk Rotation  - 1 x daily - 7 x weekly - 2 sets - 10 reps  - Hooklying Clamshell with Resistance  - 1 x daily - 7 x weekly - 2 sets - 10 reps  - Supine March with Resistance Band  -  1 x daily - 7 x weekly - 2 sets - 10 reps    Charges: TE 2 MT 1    Total Timed Treatment: 40 min  Total Treatment Time: 40 min

## 2025-03-25 ENCOUNTER — OFFICE VISIT (OUTPATIENT)
Dept: PHYSICAL THERAPY | Facility: HOSPITAL | Age: 65
End: 2025-03-25
Attending: FAMILY MEDICINE
Payer: MEDICARE

## 2025-03-25 PROCEDURE — 97140 MANUAL THERAPY 1/> REGIONS: CPT

## 2025-03-25 PROCEDURE — 97110 THERAPEUTIC EXERCISES: CPT

## 2025-03-25 NOTE — PROGRESS NOTES
Diagnosis:   Left hip pain (M25.552)  Primary osteoarthritis of left hip (M16.12)      Referring Provider: No ref. provider found  Date of Evaluation:    1/15/2025    Precautions:  None Next MD visit:   none scheduled  Date of Surgery: n/a   Insurance Primary/Secondary: MEDICARE / COMMERCIAL     # Auth Visits: POC 12 visits            Subjective: Pt notes her hip has been feeling more sore since last week. She has been doing the bike at home about every other day and this has been going well.     Pain: 4/10    Objective:   AROM: (* denotes performed with pain)  Hip Knee Foot/Ankle   Flexion: R 105; L 80  Extension: R 5; L 0*  Abduction: R 35; L 30  ER: R 40, tightness; L 20* gluteal pain  IR: R 41; L 30  Flexion: R NL; L NL  Extension: R NL; L NL    Not tested      Flexibility:  Hamstrings: 90 deg bilat     Strength/MMT: (* denotes performed with pain)   Hip Knee Foot/Ankle   Flexion: R 4+; L 4  Extension: R 5/5; L 4-/5  Abduction: R 4/5; L 4/5 Flexion: R 5/5; L 5/5  Extension: R 5/5; L 5/5    DF: R 5/5; L 5/5  PF: R 5/5; L 5/5        Gait: antalgia LLE and heavy off loading of LLE onto cane while in single limb stance during gait.     Assessment: Pt subjectively reports increased pain today, completed supine activities to reduce excess loading on joint and thus further pain. Pt able to complete modified SLR with wedge under legs, then completing SAQ and then able to flex hip. Noted challenge with L completing versus R, however able to complete on L side with decreased range provided by using wedge. Able to complete bridges with SB under knees with no pain. Continue per plan of care to achieve goals set for therapy.       Goals:    (to be met in 12 visits)    Not Met Progress Toward Partially Met Met   Pt will have improved hip AROM Flex to 110 deg to be able to don/doff shoes and perform sit to stand transfers without difficulty. []  [x]  []  []    Pt will have improved hip AROM ABD to 35 deg to be able perform bed  mobility and perform car transfers without difficulty. []  [x]  []  []    Pt will improve hip ABD and ER strength to 4+/5 to increase ease with standing and walking. []  [x]  []  []    Pt will be able to squat to  light objects around the house with <2/10 hip pain. []  [x]  []  []    Pt will improve functional hip strength to report ability to ascend/descend 1 flight of stairs reciprocally without use of handrail. []  [x]  []  []    Pt will demonstrate improved SLS to >10 seconds MARTIN to promote safety and decrease risk of falls on uneven surfaces such as grass and gravel. []  [x]  []  []    Pt will be independent and compliant with comprehensive HEP to maintain progress achieved in PT. []  [x]  []  []         Plan: Continue per plan of care.     Date: 1/22/2025  TX#: 2/12 Date:1/27/2025                 TX#: 3/12 Date:1/29/2025                 TX#: 4/12 Date: 2/28/2025            TX#: 5/ Date: 3/6/2025  Tx#: 6/10 Date: 3/19/2025  Tx#: 7/10 Date: 3/25/2025  Tx#: 8/10    Manual Therapy  Inferior glide on left hip  Lateral distraction grade III-IV on L hip  Lateral distraction MWM into flexion on L  Manual Therapy 25 min  Inferior glide on left hip  Lateral distraction grade III-IV on L hip  Lateral distraction MWM into flexion on L   STM to left gluteal region  Manual Therapy 25 min  Inferior glide on left hip  Lateral distraction grade III-IV on L hip  Lateral distraction MWM into flexion on L   STM to left gluteal region   Manual: 15 min  Lateral/inferior hip glides w/ belt, multiple bouts each way  STM to L glutes with foam roller Manual: 15 min  Lateral/inferior hip glides w/ belt, multiple bouts each way  STM to L hip flexors and quad with foam roller Manual: 15 min  Lateral/inferior hip glides w/ belt, multiple bouts each way  STM to L hip flexors and quad with foam roller   Therex  Hooklying clamshells RTB x 10  Bent knee fallouts with RTB x 10 ea leg  SL hip abduction (d/c due to pain)  Self hooklying  traction  TherEx 20 min  Supine bridge 2 x 10  Hooklying clamshells RTB x 10  Bent knee fallouts with RTB x 10 ea leg  Standing hip abduction on LLE only 2 x 8  Step ups LLE on 2 inch step 2 x 10  Step ups RLE on 4 inch step 2 x 10  TherEx  Supine bridge 2 x 10   Hooklying clamshells RTB 2 x 10  Bent knee fallouts with RTB x 10 ea leg  Sit to stands x 8   LAQ x 10  TE 38 min  Reassessment in objective  Bridges, 2x8   Hooklying clamshells, green band, 2x12  Hooklying march w/ green band, 2x10   LTR, x10 bilat  Nustep, level 3, 6 min  Pt education: updated HEP, POC moving forward and reschedule appts.  Therex: 25  Nustep, level 5, 6 min   LTR x10 bilat  DKTC, 2x10   Bridges, 2x8   Side stepping in bar, 2 laps   Fwd/retro walk in // bars, 2 laps  Seated adductor ball squeezes, x15 5 sec holds Therex: 25 min  Nustep, level 5, 6 min   Step ups to sand dune, 2x10   Sand dune, heel raises, 2x10   Side stepping in bar, 2 laps, yellow band  Alternating cone taps, BUE support, 2x30 sec   LTR x10 bilat     Therex: 25 min  Nustep, level 4, 6 min   LTR x20 bilat  Hip/knee flexion w/ SB, x20   Bridge w/ SB behind knees, 2x8  Hooklying adductor ball squeeze, x12, 5 sec holds  Modified SLR (wedge under legs, SAQ first), 3x6 bilat  DLHR, 2x15                     HEP: Access Code: [email or web address]  URL: [email or web address]  Date: 01/29/2025  Prepared by: Kei Miller    Exercises  - Clamshell with Resistance  - 2 x daily - 7 x weekly - 2 sets - 15 reps  - Supine Bridge with Resistance Band  - 2 x daily - 7 x weekly - 2 sets - 15 reps  - Sidelying Hip Abduction  - 2 x daily - 7 x weekly - 2 sets - 15 reps  - Seated Piriformis Stretch with Trunk Bend  - 2 x daily - 7 x weekly - 1 sets - 6 reps - 30 hold  - Supine Hip Abduction  - 2 x daily - 7 x weekly - 2 sets - 10 reps  - Hooklying Lumbar Traction  - 2 x daily - 7 x weekly - 1 sets - 3 reps - 30 hold  - Sitting Knee Extension with Resistance  - 2 x daily - 7 x weekly  - 2 sets - 10 reps  - standing hip abduction 2 x 10     Access Code: [email or web address]  URL: [email or web address]  Date: 02/28/2025  Prepared by: Catrachita Carey    Exercises  - Supine Bridge  - 1 x daily - 7 x weekly - 2-3 sets - 10 reps  - Supine Lower Trunk Rotation  - 1 x daily - 7 x weekly - 2 sets - 10 reps  - Hooklying Clamshell with Resistance  - 1 x daily - 7 x weekly - 2 sets - 10 reps  - Supine March with Resistance Band  - 1 x daily - 7 x weekly - 2 sets - 10 reps    Charges: TE 2 MT 1    Total Timed Treatment: 40 min  Total Treatment Time: 40 min   Yes

## 2025-04-02 ENCOUNTER — OFFICE VISIT (OUTPATIENT)
Dept: PHYSICAL THERAPY | Facility: HOSPITAL | Age: 65
End: 2025-04-02
Attending: FAMILY MEDICINE
Payer: MEDICARE

## 2025-04-02 PROCEDURE — 97140 MANUAL THERAPY 1/> REGIONS: CPT

## 2025-04-02 PROCEDURE — 97110 THERAPEUTIC EXERCISES: CPT

## 2025-04-02 NOTE — PROGRESS NOTES
Diagnosis:   Left hip pain (M25.552)  Primary osteoarthritis of left hip (M16.12)      Referring Provider: No ref. provider found  Date of Evaluation:    1/15/2025    Precautions:  None Next MD visit:   none scheduled  Date of Surgery: n/a   Insurance Primary/Secondary: MEDICARE / COMMERCIAL     # Auth Visits: POC 12 visits            Subjective: Pt reports her pain is less today. Has had less pain overall, but just feels weak.     Pain: no pain reported today.     Objective:   AROM: (* denotes performed with pain)  Hip Knee Foot/Ankle   Flexion: R 105; L 80  Extension: R 5; L 0*  Abduction: R 35; L 30  ER: R 40, tightness; L 20* gluteal pain  IR: R 41; L 30  Flexion: R NL; L NL  Extension: R NL; L NL    Not tested      Flexibility:  Hamstrings: 90 deg bilat     Strength/MMT: (* denotes performed with pain)   Hip Knee Foot/Ankle   Flexion: R 4+; L 4  Extension: R 5/5; L 4-/5  Abduction: R 4/5; L 4/5 Flexion: R 5/5; L 5/5  Extension: R 5/5; L 5/5    DF: R 5/5; L 5/5  PF: R 5/5; L 5/5        Gait: antalgia LLE and heavy off loading of LLE onto cane while in single limb stance during gait.     Assessment: Pt has good tolerance for session today with improvements in modified SLR exercise on LLE. Pt able to complete without pain in hip and with less fatigue noted by end of 6 reps. Pt able to complete step ups to sand dune, alternating LE's with good form and no reports of pain, BUE support utilized for safety. Discussed with pt upcoming discharge, will reassess at next visit and complete discharge planning with patient.       Goals:    (to be met in 12 visits)    Not Met Progress Toward Partially Met Met   Pt will have improved hip AROM Flex to 110 deg to be able to don/doff shoes and perform sit to stand transfers without difficulty. []  [x]  []  []    Pt will have improved hip AROM ABD to 35 deg to be able perform bed mobility and perform car transfers without difficulty. []  [x]  []  []    Pt will improve hip ABD and  ER strength to 4+/5 to increase ease with standing and walking. []  [x]  []  []    Pt will be able to squat to  light objects around the house with <2/10 hip pain. []  [x]  []  []    Pt will improve functional hip strength to report ability to ascend/descend 1 flight of stairs reciprocally without use of handrail. []  [x]  []  []    Pt will demonstrate improved SLS to >10 seconds MARTIN to promote safety and decrease risk of falls on uneven surfaces such as grass and gravel. []  [x]  []  []    Pt will be independent and compliant with comprehensive HEP to maintain progress achieved in PT. []  [x]  []  []         Plan: Continue per plan of care.     Date: 1/22/2025  TX#: 2/12 Date:1/27/2025                 TX#: 3/12 Date:1/29/2025                 TX#: 4/12 Date: 2/28/2025            TX#: 5/ Date: 3/6/2025  Tx#: 6/10 Date: 3/19/2025  Tx#: 7/10 Date: 3/25/2025  Tx#: 8/10  Date: 4/2/2025  Tx#: 9/10   Manual Therapy  Inferior glide on left hip  Lateral distraction grade III-IV on L hip  Lateral distraction MWM into flexion on L  Manual Therapy 25 min  Inferior glide on left hip  Lateral distraction grade III-IV on L hip  Lateral distraction MWM into flexion on L   STM to left gluteal region  Manual Therapy 25 min  Inferior glide on left hip  Lateral distraction grade III-IV on L hip  Lateral distraction MWM into flexion on L   STM to left gluteal region   Manual: 15 min  Lateral/inferior hip glides w/ belt, multiple bouts each way  STM to L glutes with foam roller Manual: 15 min  Lateral/inferior hip glides w/ belt, multiple bouts each way  STM to L hip flexors and quad with foam roller Manual: 15 min  Lateral/inferior hip glides w/ belt, multiple bouts each way  STM to L hip flexors and quad with foam roller Manual: 10 min  Lateral/inferior hip glides w/ belt, multiple bouts each way  STM to L hip flexors    Therex  Hooklying clamshells RTB x 10  Bent knee fallouts with RTB x 10 ea leg  SL hip abduction (d/c due to  pain)  Self hooklying traction  TherEx 20 min  Supine bridge 2 x 10  Hooklying clamshells RTB x 10  Bent knee fallouts with RTB x 10 ea leg  Standing hip abduction on LLE only 2 x 8  Step ups LLE on 2 inch step 2 x 10  Step ups RLE on 4 inch step 2 x 10  TherEx  Supine bridge 2 x 10   Hooklying clamshells RTB 2 x 10  Bent knee fallouts with RTB x 10 ea leg  Sit to stands x 8   LAQ x 10  TE 38 min  Reassessment in objective  Bridges, 2x8   Hooklying clamshells, green band, 2x12  Hooklying march w/ green band, 2x10   LTR, x10 bilat  Nustep, level 3, 6 min  Pt education: updated HEP, POC moving forward and reschedule appts.  Therex: 25  Nustep, level 5, 6 min   LTR x10 bilat  DKTC, 2x10   Bridges, 2x8   Side stepping in bar, 2 laps   Fwd/retro walk in // bars, 2 laps  Seated adductor ball squeezes, x15 5 sec holds Therex: 25 min  Nustep, level 5, 6 min   Step ups to sand dune, 2x10   Sand dune, heel raises, 2x10   Side stepping in bar, 2 laps, yellow band  Alternating cone taps, BUE support, 2x30 sec   LTR x10 bilat     Therex: 25 min  Nustep, level 4, 6 min   LTR x20 bilat  Hip/knee flexion w/ SB, x20   Bridge w/ SB behind knees, 2x8  Hooklying adductor ball squeeze, x12, 5 sec holds  Modified SLR (wedge under legs, SAQ first), 3x6 bilat  DLHR, 2x15 Therex: 30 min  Nustep, level 5, 6 min   Hip/knee flexion w/ SB, x25  Bridge w/ SB behind knees, 3x8  Hooklying adductor ball squeeze, x12, 5 sec holds  Modified SLR (wedge under legs, SAQ first), 3x6 bilat  Step ups to sand dune, 2x10   A/P board, x1 min                       HEP: Access Code: [email or web address]  URL: [email or web address]  Date: 01/29/2025  Prepared by: Kei Miller    Exercises  - Clamshell with Resistance  - 2 x daily - 7 x weekly - 2 sets - 15 reps  - Supine Bridge with Resistance Band  - 2 x daily - 7 x weekly - 2 sets - 15 reps  - Sidelying Hip Abduction  - 2 x daily - 7 x weekly - 2 sets - 15 reps  - Seated Piriformis Stretch with  Trunk Bend  - 2 x daily - 7 x weekly - 1 sets - 6 reps - 30 hold  - Supine Hip Abduction  - 2 x daily - 7 x weekly - 2 sets - 10 reps  - Hooklying Lumbar Traction  - 2 x daily - 7 x weekly - 1 sets - 3 reps - 30 hold  - Sitting Knee Extension with Resistance  - 2 x daily - 7 x weekly - 2 sets - 10 reps  - standing hip abduction 2 x 10     Access Code: [email or web address]  URL: [email or web address]  Date: 02/28/2025  Prepared by: Catrachita Carey    Exercises  - Supine Bridge  - 1 x daily - 7 x weekly - 2-3 sets - 10 reps  - Supine Lower Trunk Rotation  - 1 x daily - 7 x weekly - 2 sets - 10 reps  - Hooklying Clamshell with Resistance  - 1 x daily - 7 x weekly - 2 sets - 10 reps  - Supine March with Resistance Band  - 1 x daily - 7 x weekly - 2 sets - 10 reps    Charges: TE 2 MT 1    Total Timed Treatment: 40 min  Total Treatment Time: 40 min

## 2025-04-09 ENCOUNTER — APPOINTMENT (OUTPATIENT)
Dept: PHYSICAL THERAPY | Facility: HOSPITAL | Age: 65
End: 2025-04-09
Attending: FAMILY MEDICINE
Payer: MEDICARE

## 2025-04-29 ENCOUNTER — LAB ENCOUNTER (OUTPATIENT)
Dept: LAB | Age: 65
End: 2025-04-29
Attending: FAMILY MEDICINE
Payer: MEDICARE

## 2025-04-29 ENCOUNTER — TELEPHONE (OUTPATIENT)
Dept: FAMILY MEDICINE CLINIC | Facility: CLINIC | Age: 65
End: 2025-04-29

## 2025-04-29 DIAGNOSIS — R73.03 PREDIABETES: ICD-10-CM

## 2025-04-29 DIAGNOSIS — I10 ESSENTIAL HYPERTENSION: ICD-10-CM

## 2025-04-29 DIAGNOSIS — E55.9 VITAMIN D DEFICIENCY: ICD-10-CM

## 2025-04-29 DIAGNOSIS — E78.5 DYSLIPIDEMIA: ICD-10-CM

## 2025-04-29 DIAGNOSIS — I10 ESSENTIAL HYPERTENSION: Primary | ICD-10-CM

## 2025-04-29 DIAGNOSIS — Z79.899 MEDICATION MANAGEMENT: ICD-10-CM

## 2025-04-29 PROBLEM — M16.12 UNILATERAL PRIMARY OSTEOARTHRITIS, LEFT HIP: Status: ACTIVE | Noted: 2024-02-19

## 2025-04-29 LAB
ALBUMIN SERPL-MCNC: 4.9 G/DL (ref 3.2–4.8)
ALBUMIN/GLOB SERPL: 1.7 {RATIO} (ref 1–2)
ALP LIVER SERPL-CCNC: 105 U/L (ref 50–130)
ALT SERPL-CCNC: 18 U/L (ref 10–49)
ANION GAP SERPL CALC-SCNC: 9 MMOL/L (ref 0–18)
AST SERPL-CCNC: 26 U/L (ref ?–34)
BASOPHILS # BLD AUTO: 0.02 X10(3) UL (ref 0–0.2)
BASOPHILS NFR BLD AUTO: 0.4 %
BILIRUB SERPL-MCNC: 0.4 MG/DL (ref 0.2–1.1)
BUN BLD-MCNC: 22 MG/DL (ref 9–23)
CALCIUM BLD-MCNC: 10.3 MG/DL (ref 8.7–10.6)
CHLORIDE SERPL-SCNC: 106 MMOL/L (ref 98–112)
CHOLEST SERPL-MCNC: 239 MG/DL (ref ?–200)
CO2 SERPL-SCNC: 24 MMOL/L (ref 21–32)
CREAT BLD-MCNC: 1.26 MG/DL (ref 0.55–1.02)
CREAT UR-SCNC: 217.4 MG/DL
EGFRCR SERPLBLD CKD-EPI 2021: 47 ML/MIN/1.73M2 (ref 60–?)
EOSINOPHIL # BLD AUTO: 0.25 X10(3) UL (ref 0–0.7)
EOSINOPHIL NFR BLD AUTO: 4.4 %
ERYTHROCYTE [DISTWIDTH] IN BLOOD BY AUTOMATED COUNT: 15.5 %
EST. AVERAGE GLUCOSE BLD GHB EST-MCNC: 123 MG/DL (ref 68–126)
FASTING PATIENT LIPID ANSWER: NO
FASTING STATUS PATIENT QL REPORTED: NO
GLOBULIN PLAS-MCNC: 2.9 G/DL (ref 2–3.5)
GLUCOSE BLD-MCNC: 123 MG/DL (ref 70–99)
HBA1C MFR BLD: 5.9 % (ref ?–5.7)
HCT VFR BLD AUTO: 39.7 % (ref 35–48)
HDLC SERPL-MCNC: 51 MG/DL (ref 40–59)
HGB BLD-MCNC: 13.3 G/DL (ref 12–16)
IMM GRANULOCYTES # BLD AUTO: 0.01 X10(3) UL (ref 0–1)
IMM GRANULOCYTES NFR BLD: 0.2 %
LDLC SERPL CALC-MCNC: 159 MG/DL (ref ?–100)
LYMPHOCYTES # BLD AUTO: 2.4 X10(3) UL (ref 1–4)
LYMPHOCYTES NFR BLD AUTO: 42.1 %
MCH RBC QN AUTO: 30.1 PG (ref 26–34)
MCHC RBC AUTO-ENTMCNC: 33.5 G/DL (ref 31–37)
MCV RBC AUTO: 89.8 FL (ref 80–100)
MICROALBUMIN UR-MCNC: 2.8 MG/DL
MICROALBUMIN/CREAT 24H UR-RTO: 12.9 UG/MG (ref ?–30)
MONOCYTES # BLD AUTO: 0.5 X10(3) UL (ref 0.1–1)
MONOCYTES NFR BLD AUTO: 8.8 %
NEUTROPHILS # BLD AUTO: 2.52 X10 (3) UL (ref 1.5–7.7)
NEUTROPHILS # BLD AUTO: 2.52 X10(3) UL (ref 1.5–7.7)
NEUTROPHILS NFR BLD AUTO: 44.1 %
NONHDLC SERPL-MCNC: 188 MG/DL (ref ?–130)
OSMOLALITY SERPL CALC.SUM OF ELEC: 293 MOSM/KG (ref 275–295)
PLATELET # BLD AUTO: 191 10(3)UL (ref 150–450)
POTASSIUM SERPL-SCNC: 4.8 MMOL/L (ref 3.5–5.1)
PROT SERPL-MCNC: 7.8 G/DL (ref 5.7–8.2)
RBC # BLD AUTO: 4.42 X10(6)UL (ref 3.8–5.3)
SODIUM SERPL-SCNC: 139 MMOL/L (ref 136–145)
TRIGL SERPL-MCNC: 162 MG/DL (ref 30–149)
TSI SER-ACNC: 1.41 UIU/ML (ref 0.55–4.78)
VIT D+METAB SERPL-MCNC: 34.1 NG/ML (ref 30–100)
VLDLC SERPL CALC-MCNC: 32 MG/DL (ref 0–30)
WBC # BLD AUTO: 5.7 X10(3) UL (ref 4–11)

## 2025-04-29 PROCEDURE — 81001 URINALYSIS AUTO W/SCOPE: CPT

## 2025-04-29 PROCEDURE — 84443 ASSAY THYROID STIM HORMONE: CPT

## 2025-04-29 PROCEDURE — 83036 HEMOGLOBIN GLYCOSYLATED A1C: CPT

## 2025-04-29 PROCEDURE — 82043 UR ALBUMIN QUANTITATIVE: CPT

## 2025-04-29 PROCEDURE — 80061 LIPID PANEL: CPT

## 2025-04-29 PROCEDURE — 81015 MICROSCOPIC EXAM OF URINE: CPT

## 2025-04-29 PROCEDURE — 85025 COMPLETE CBC W/AUTO DIFF WBC: CPT

## 2025-04-29 PROCEDURE — 82570 ASSAY OF URINE CREATININE: CPT

## 2025-04-29 PROCEDURE — 36415 COLL VENOUS BLD VENIPUNCTURE: CPT

## 2025-04-29 PROCEDURE — 80053 COMPREHEN METABOLIC PANEL: CPT

## 2025-04-29 PROCEDURE — 82306 VITAMIN D 25 HYDROXY: CPT

## 2025-04-29 NOTE — TELEPHONE ENCOUNTER
Labs are in system for 5/15/25   Per boris Kan to place new orders to have pt draw labs today   Called pt and notified her new orders were placed

## 2025-04-29 NOTE — TELEPHONE ENCOUNTER
Patient is calling us to inform us that [facility] told her is too soon for her to get her labs done she is currently at the lab and would like to get them completed today.

## 2025-04-30 LAB
COLOR UR AUTO: YELLOW
GLUCOSE UR STRIP.AUTO-MCNC: NEGATIVE MG/DL
KETONES UR STRIP.AUTO-MCNC: NEGATIVE MG/DL
NITRITE UR QL STRIP.AUTO: NEGATIVE
PH UR STRIP.AUTO: 5.5 [PH] (ref 5–8)
SP GR UR STRIP.AUTO: >=1.03 (ref 1–1.03)
UROBILINOGEN UR STRIP.AUTO-MCNC: 0.2 MG/DL

## 2025-05-01 ENCOUNTER — PATIENT MESSAGE (OUTPATIENT)
Dept: FAMILY MEDICINE CLINIC | Facility: CLINIC | Age: 65
End: 2025-05-01

## 2025-05-04 ENCOUNTER — PATIENT MESSAGE (OUTPATIENT)
Dept: FAMILY MEDICINE CLINIC | Facility: CLINIC | Age: 65
End: 2025-05-04

## 2025-05-04 DIAGNOSIS — E78.5 DYSLIPIDEMIA: ICD-10-CM

## 2025-05-05 RX ORDER — EVOLOCUMAB 140 MG/ML
1 INJECTION, SOLUTION SUBCUTANEOUS
Qty: 2.1 ML | Refills: 1 | Status: SHIPPED | OUTPATIENT
Start: 2025-05-05

## 2025-05-05 NOTE — TELEPHONE ENCOUNTER
New breast lump, lipoma and diagnostic mammo request can be discussed during visit on 5/7/25?     LOV- 2/25/25  NOV-5/7/25  Last refill-2/25/25  Qty-2ml    RF/-1    Rx pended for approval  Thank you

## 2025-05-07 ENCOUNTER — OFFICE VISIT (OUTPATIENT)
Dept: FAMILY MEDICINE CLINIC | Facility: CLINIC | Age: 65
End: 2025-05-07
Payer: MEDICARE

## 2025-05-07 VITALS
SYSTOLIC BLOOD PRESSURE: 120 MMHG | OXYGEN SATURATION: 97 % | HEART RATE: 65 BPM | HEIGHT: 66.5 IN | BODY MASS INDEX: 43.83 KG/M2 | DIASTOLIC BLOOD PRESSURE: 72 MMHG | RESPIRATION RATE: 18 BRPM | WEIGHT: 276 LBS

## 2025-05-07 DIAGNOSIS — F33.1 MODERATE EPISODE OF RECURRENT MAJOR DEPRESSIVE DISORDER (HCC): ICD-10-CM

## 2025-05-07 DIAGNOSIS — F33.0 MILD EPISODE OF RECURRENT MAJOR DEPRESSIVE DISORDER: ICD-10-CM

## 2025-05-07 DIAGNOSIS — E78.5 DYSLIPIDEMIA: ICD-10-CM

## 2025-05-07 DIAGNOSIS — M25.552 LEFT HIP PAIN: ICD-10-CM

## 2025-05-07 DIAGNOSIS — N30.01 ACUTE CYSTITIS WITH HEMATURIA: ICD-10-CM

## 2025-05-07 DIAGNOSIS — F41.9 ANXIETY: ICD-10-CM

## 2025-05-07 DIAGNOSIS — I10 ESSENTIAL HYPERTENSION: Primary | ICD-10-CM

## 2025-05-07 DIAGNOSIS — N64.59 ABNORMAL BREAST TISSUE: ICD-10-CM

## 2025-05-07 DIAGNOSIS — M16.12 PRIMARY OSTEOARTHRITIS OF LEFT HIP: ICD-10-CM

## 2025-05-07 PROCEDURE — 99215 OFFICE O/P EST HI 40 MIN: CPT

## 2025-05-07 RX ORDER — BUPROPION HYDROCHLORIDE 150 MG/1
150 TABLET ORAL DAILY
Qty: 90 TABLET | Refills: 1 | Status: SHIPPED | OUTPATIENT
Start: 2025-05-07

## 2025-05-07 RX ORDER — OMEPRAZOLE 40 MG/1
40 CAPSULE, DELAYED RELEASE ORAL 2 TIMES DAILY
Qty: 180 CAPSULE | Refills: 1 | Status: SHIPPED | OUTPATIENT
Start: 2025-05-07

## 2025-05-07 RX ORDER — MELOXICAM 15 MG/1
15 TABLET ORAL DAILY
Qty: 90 TABLET | Refills: 1 | Status: SHIPPED | OUTPATIENT
Start: 2025-05-07

## 2025-05-07 RX ORDER — VALSARTAN 80 MG/1
240 TABLET ORAL DAILY
Qty: 270 TABLET | Refills: 1 | Status: SHIPPED | OUTPATIENT
Start: 2025-05-07

## 2025-05-07 RX ORDER — EVOLOCUMAB 140 MG/ML
1 INJECTION, SOLUTION SUBCUTANEOUS
Qty: 2.1 ML | Refills: 1 | Status: SHIPPED | OUTPATIENT
Start: 2025-05-07

## 2025-05-07 RX ORDER — SERTRALINE HYDROCHLORIDE 100 MG/1
200 TABLET, FILM COATED ORAL DAILY
Qty: 180 TABLET | Refills: 1 | Status: SHIPPED | OUTPATIENT
Start: 2025-05-07

## 2025-05-07 NOTE — PROGRESS NOTES
Subjective:   Deonna Vela is a 64 year old female who presents for Medication Follow-Up and Lump (Patient found a lump in her left breast this week. )     Pt presents for med check  LOV 2/25/25 with Dr Erickson:  Prediabetes -- labs ordered for 3 months  Dyslipidemia/CAD -- on repatha and doing well  -- repeat CMP and lipids today  Allergies -- stable  GERD -- omeprazole to 40 mg twice a day and monitor with meloxicam since notes discomfort at times  Morbid obesity -- focus on diet and exercise  Left hip OA and pain  -- saw [facility] and will do sx at BMI 40; will update them with worsening imaging of left hip  Hypertension -  On valsartan  -- increase from 160 to 240 mg and monitor BP.  CAD -- stable; saw Dr. Ballesteros and due to see him yearly  Depression-stable on sertraline; continue therapy with Antonietta; advised to add wellbutrin  mg daily       Pt had mammogram with addt'l views and US in Nov 2024.   Results are as follows:   BREAST COMPOSITION:  Almost entirely fatty.  FINDINGS:   Additional views with compression and tomosynthesis reveal small low density retroareolar nodules.  At targeted sonography at the 1 o'clock position in the retroareolar region, there is a small lymph node or complex benign cyst which is nonvascular   measuring 7 x 3 mm.  At the 8 o'clock position, 3 cm from the nipple, there is a 10 x 2 mm benign lymph node.  No suspicious mass noted.  Routine screening recommended.  The CAD was negative.     Pt is to follow up with screening mammo in 12 months.     Pt is hoping to have surgery (Left hip replacement) after getting back from [place]. Will be up there until she can do surgery. Pt was advised to get BMI under 40 for surgery.     Confirmed all meds with pt. Is doing well. Needs refills.       History/Other:    Chief Complaint Reviewed and Verified  Nursing Notes Reviewed and   Verified  Allergies Reviewed  Medications Reviewed         Tobacco:  She smoked tobacco in the past but  quit greater than 12 months ago.  Tobacco Use[1]     Current Medications[2]      Review of Systems:  Review of Systems   Constitutional:  Negative for fatigue and fever.   Musculoskeletal:  Positive for arthralgias, back pain and gait problem.   Skin:         Pt states she feels lump in left breast that is new   All other systems reviewed and are negative.        Objective:   /72 (BP Location: Right arm, Patient Position: Sitting, Cuff Size: adult)   Pulse 65   Resp 18   Ht 5' 6.5\" (1.689 m)   Wt 276 lb (125.2 kg)   LMP 05/15/2008   SpO2 97%   BMI 43.88 kg/m²  Estimated body mass index is 43.88 kg/m² as calculated from the following:    Height as of this encounter: 5' 6.5\" (1.689 m).    Weight as of this encounter: 276 lb (125.2 kg).  Physical Exam  Vitals reviewed.   Constitutional:       Appearance: Normal appearance.   HENT:      Head: Normocephalic and atraumatic.   Eyes:      General: No scleral icterus.        Right eye: No discharge.         Left eye: No discharge.      Conjunctiva/sclera: Conjunctivae normal.   Cardiovascular:      Rate and Rhythm: Normal rate and regular rhythm.      Pulses: Normal pulses.      Heart sounds: Normal heart sounds.   Pulmonary:      Effort: Pulmonary effort is normal.      Breath sounds: Normal breath sounds.   Chest:          Comments: \"Lump\" that pt describes feels like a cord of thickened tissue next to sternum on left side.   Musculoskeletal:      Cervical back: Normal range of motion.      Right lower leg: No edema.      Left lower leg: No edema.   Skin:     General: Skin is warm and dry.   Neurological:      General: No focal deficit present.      Mental Status: She is alert and oriented to person, place, and time.   Psychiatric:         Mood and Affect: Mood normal.         Behavior: Behavior normal.         Thought Content: Thought content normal.         Judgment: Judgment normal.         Assessment & Plan:   1. Essential hypertension (Primary)  -      Valsartan; Take 3 tablets (240 mg total) by mouth daily.  Dispense: 270 tablet; Refill: 1  2. Dyslipidemia  -     Repatha SureClick; Inject 1 mL into the skin every 14 (fourteen) days.  Dispense: 2.1 mL; Refill: 1  3. Left hip pain  -     Meloxicam; Take 1 tablet (15 mg total) by mouth daily.  Dispense: 90 tablet; Refill: 1  4. Moderate episode of recurrent major depressive disorder (HCC)  -     Sertraline HCl; Take 2 tablets (200 mg total) by mouth daily.  Dispense: 180 tablet; Refill: 1  5. Acute cystitis with hematuria  -     Omeprazole; Take 1 capsule (40 mg total) by mouth in the morning and 1 capsule (40 mg total) before bedtime.  Dispense: 180 capsule; Refill: 1  6. Primary osteoarthritis of left hip  -     Meloxicam; Take 1 tablet (15 mg total) by mouth daily.  Dispense: 90 tablet; Refill: 1  7. Mild episode of recurrent major depressive disorder  -     buPROPion HCl ER (XL); Take 1 tablet (150 mg total) by mouth daily.  Dispense: 90 tablet; Refill: 1  8. Abnormal breast tissue  -     US BREAST LEFT COMPLETE (CPT=76641); Future; Expected date: 2025  9. Anxiety  -     Sertraline HCl; Take 2 tablets (200 mg total) by mouth daily.  Dispense: 180 tablet; Refill: 1        Return in about 10 weeks (around 2025) for Physical; call to schedule once back from [place] (around September) .    NUSRAT Mora, 2025, 8:00 AM          [1]   Social History  Tobacco Use   Smoking Status Former    Current packs/day: 0.00    Average packs/day: 1 pack/day for 12.0 years (12.0 ttl pk-yrs)    Types: Cigarettes    Start date: 1973    Quit date: 1985    Years since quittin.3   Smokeless Tobacco Never   Tobacco Comments    Updated 24   [2]   Current Outpatient Medications   Medication Sig Dispense Refill    sertraline 100 MG Oral Tab Take 2 tablets (200 mg total) by mouth daily. 180 tablet 1    Omeprazole 40 MG Oral Capsule Delayed Release Take 1 capsule (40 mg total) by mouth in the morning and 1  capsule (40 mg total) before bedtime. 180 capsule 1    valsartan 80 MG Oral Tab Take 3 tablets (240 mg total) by mouth daily. 270 tablet 1    Meloxicam 15 MG Oral Tab Take 1 tablet (15 mg total) by mouth daily. 90 tablet 1    REPATHA SURECLICK 140 MG/ML Subcutaneous Solution Auto-injector Inject 1 mL into the skin every 14 (fourteen) days. 2.1 mL 1    buPROPion  MG Oral Tablet 24 Hr Take 1 tablet (150 mg total) by mouth daily. 90 tablet 1    ALPRAZolam 0.25 MG Oral Tab Take 1 tablet (0.25 mg total) by mouth daily as needed (panic attacks). 30 tablet 1    traMADol 50 MG Oral Tab Take 1 tablet (50 mg total) by mouth in the morning and 1 tablet (50 mg total) before bedtime. 180 tablet 1    acetaminophen 500 MG Oral Tab Take 1 tablet (500 mg total) by mouth every 6 (six) hours as needed for Pain.      azelastine 0.1 % Nasal Solution 2 sprays by Nasal route 2 (two) times daily. 30 mL 0    triamcinolone 0.1 % External Cream Apply 1 Application topically 2 (two) times daily. Apply to scar twice daily 45 g 0    Multiple Vitamins-Minerals (ONE DAILY CALCIUM/IRON) Oral Tab Take 1 tablet by mouth daily.      Cholecalciferol (VITAMIN D) 25 MCG (1000 UT) Oral Tab Take 4,000 Units by mouth daily.      docusate sodium 100 MG Oral Cap Take 1 capsule (100 mg total) by mouth 2 (two) times daily.      omega-3 fatty acids 1000 MG Oral Cap Take 1,000 mg by mouth daily.      aspirin 81 MG Oral Chew Tab Chew 1 tablet (81 mg total) by mouth daily.      mometasone (NASONEX) 50 MCG/ACT Nasal Suspension 2 sprays by Nasal route as needed. 17 g 6

## 2025-05-12 ENCOUNTER — HOSPITAL ENCOUNTER (OUTPATIENT)
Dept: MAMMOGRAPHY | Facility: HOSPITAL | Age: 65
Discharge: HOME | End: 2025-05-12
Payer: MEDICARE

## 2025-05-12 DIAGNOSIS — N64.59 ABNORMAL BREAST TISSUE: ICD-10-CM

## 2025-05-12 PROCEDURE — 76642 ULTRASOUND BREAST LIMITED: CPT

## 2025-06-02 ENCOUNTER — OFFICE VISIT (OUTPATIENT)
Dept: PHYSICAL THERAPY | Facility: HOSPITAL | Age: 65
End: 2025-06-02
Attending: FAMILY MEDICINE
Payer: MEDICARE

## 2025-06-02 PROCEDURE — 97110 THERAPEUTIC EXERCISES: CPT

## 2025-06-02 PROCEDURE — 97140 MANUAL THERAPY 1/> REGIONS: CPT

## 2025-06-02 NOTE — PROGRESS NOTES
Diagnosis:   Left hip pain (M25.552)  Primary osteoarthritis of left hip (M16.12)      Referring Provider: No ref. provider found  Date of Evaluation:    1/15/2025    Precautions:  None Next MD visit:   none scheduled  Date of Surgery: n/a   Insurance Primary/Secondary: MEDICARE / COMMERCIAL     # Auth Visits: POC 12 visits           Discharge Summary  Pt has attended 10 visits in Physical Therapy.    Subjective: Pt reports she has been able to go up and down stairs \"normally\" with UE support. It can be painful but she is able to do. Planning on hip replacement in September/the fall.     Pain: no pain reported today.     Objective:   AROM: (* denotes performed with pain)  Hip Knee Foot/Ankle   Flexion: R 105; L 105  Extension: R 5; L 5*  Abduction: R 55; L 25*  ER: R 45, tightness; L 40* gluteal pain  IR: R 42; L 35  Flexion: R NL; L NL  Extension: R NL; L NL    Not tested         Strength/MMT: (* denotes performed with pain)   Hip Knee Foot/Ankle   Flexion: R 5; L 4+  Hip ER: R 5; L 3+*  Hip IR: R 5; L 4+   Extension: R 5/5; L 4+/5 (screened standing)  Abduction: R 5/5; L 5/5 (screened sitting) Flexion: R 5/5; L 5/5  Extension: R 5/5; L 5/5    DF: R 5/5; L 5/5  PF: R 5/5; L 5/5        Gait: antalgia LLE and heavy off loading of LLE onto cane while in single limb stance during gait.     Assessment: Deonna has attended 10 visits in outpatient physical therapy. In this time, she has been able to improve hip AROM, strength and functional mobility in preparation for hip replacement later this year. Notably, hip IR/ER and abduction AROM have improved since last assessment on the L hip. Gross hip strength has improved bilaterally to at least 4+/5, except L hip ER is limited due to pain with resistance. Discussed with pt continuing exercises and additional aquatic [place]. At this time, pt to discharge from skilled PT, continuing with HEP and returning as needed in preparation  for L hip replacement later this year.       Goals:    (to be met in 12 visits)    Not Met Progress Toward Partially Met Met   Pt will have improved hip AROM Flex to 110 deg to be able to don/doff shoes and perform sit to stand transfers without difficulty. []  []  [x]  []    Pt will have improved hip AROM ABD to 35 deg to be able perform bed mobility and perform car transfers without difficulty. []  []  [x]  []    Pt will improve hip ABD and ER strength to 4+/5 to increase ease with standing and walking. []  []  []  [x]    Pt will be able to squat to  light objects around the house with <2/10 hip pain. []  []  []  [x]    Pt will improve functional hip strength to report ability to ascend/descend 1 flight of stairs reciprocally without use of handrail. []  []  [x]  []    Pt will demonstrate improved SLS to >10 seconds MARTIN to promote safety and decrease risk of falls on uneven surfaces such as grass and gravel. []  [x]  []  []    Pt will be independent and compliant with comprehensive HEP to maintain progress achieved in PT. []  []  []  [x]       LEFS Score  LEFS Score: (Patient-Rptd) 37.5 % (1/14/2025 11:40 PM)    Post LEFS Score  Post LEFS Score: (Patient-Rptd) 50 % (6/2/2025  3:41 PM)    12.5 % improvement    Plan: Discharge     Patient/Family/Caregiver was advised of these findings, precautions, and treatment options and has agreed to actively participate in planning and for this course of care.    Thank you for your referral. If you have any questions, please contact me at Dept: [phone].    Sincerely,  Electronically signed by therapist: Catrachita Carey PT, DPT     Certification From: 6/2/2025  To:8/31/2025      Date:1/29/2025                 TX#: 4/12 Date: 2/28/2025            TX#: 5/ Date: 3/6/2025  Tx#: 6/10 Date: 3/19/2025  Tx#: 7/10 Date: 3/25/2025  Tx#: 8/10  Date: 4/2/2025  Tx#: 9/10 Date: 6/2/2025  Tx#: 10/10    Manual Therapy 25 min  Inferior glide on left hip  Lateral distraction grade III-IV  on L hip  Lateral distraction MWM into flexion on L   STM to left gluteal region   Manual: 15 min  Lateral/inferior hip glides w/ belt, multiple bouts each way  STM to L glutes with foam roller Manual: 15 min  Lateral/inferior hip glides w/ belt, multiple bouts each way  STM to L hip flexors and quad with foam roller Manual: 15 min  Lateral/inferior hip glides w/ belt, multiple bouts each way  STM to L hip flexors and quad with foam roller Manual: 10 min  Lateral/inferior hip glides w/ belt, multiple bouts each way  STM to L hip flexors  Manual: 15 min  Lateral/inferior hip glides w/ belt, multiple bouts each way  STM to L hip flexors   TherEx  Supine bridge 2 x 10   Hooklying clamshells RTB 2 x 10  Bent knee fallouts with RTB x 10 ea leg  Sit to stands x 8   LAQ x 10  TE 38 min  Reassessment in objective  Bridges, 2x8   Hooklying clamshells, green band, 2x12  Hooklying march w/ green band, 2x10   LTR, x10 bilat  Nustep, level 3, 6 min  Pt education: updated HEP, POC moving forward and reschedule appts.  Therex: 25  Nustep, level 5, 6 min   LTR x10 bilat  DKTC, 2x10   Bridges, 2x8   Side stepping in bar, 2 laps   Fwd/retro walk in // bars, 2 laps  Seated adductor ball squeezes, x15 5 sec holds Therex: 25 min  Nustep, level 5, 6 min   Step ups to sand dune, 2x10   Sand dune, heel raises, 2x10   Side stepping in bar, 2 laps, yellow band  Alternating cone taps, BUE support, 2x30 sec   LTR x10 bilat     Therex: 25 min  Nustep, level 4, 6 min   LTR x20 bilat  Hip/knee flexion w/ SB, x20   Bridge w/ SB behind knees, 2x8  Hooklying adductor ball squeeze, x12, 5 sec holds  Modified SLR (wedge under legs, SAQ first), 3x6 bilat  DLHR, 2x15 Therex: 30 min  Nustep, level 5, 6 min   Hip/knee flexion w/ SB, x25  Bridge w/ SB behind knees, 3x8  Hooklying adductor ball squeeze, x12, 5 sec holds  Modified SLR (wedge under legs, SAQ first), 3x6 bilat  Step ups to sand dune, 2x10   A/P board, x1 min Therex: 28 min  Nustep, level 4, 6  min, LE's only  Reassessment in objective   LTR, x20  Hip/knee flexion w/ SB, x25  Bridge w/ SB behind knees, 3x5  Pt education: updated HEP, water exercises                     HEP: Access Code: [email or web address]  URL: [email or web address]  Date: 01/29/2025  Prepared by: Kei Miller    Exercises  - Clamshell with Resistance  - 2 x daily - 7 x weekly - 2 sets - 15 reps  - Supine Bridge with Resistance Band  - 2 x daily - 7 x weekly - 2 sets - 15 reps  - Sidelying Hip Abduction  - 2 x daily - 7 x weekly - 2 sets - 15 reps  - Seated Piriformis Stretch with Trunk Bend  - 2 x daily - 7 x weekly - 1 sets - 6 reps - 30 hold  - Supine Hip Abduction  - 2 x daily - 7 x weekly - 2 sets - 10 reps  - Hooklying Lumbar Traction  - 2 x daily - 7 x weekly - 1 sets - 3 reps - 30 hold  - Sitting Knee Extension with Resistance  - 2 x daily - 7 x weekly - 2 sets - 10 reps  - standing hip abduction 2 x 10     Access Code: [email or web address]  URL: [email or web address]  Date: 02/28/2025  Prepared by: Catrachita Charin    Exercises  - Supine Bridge  - 1 x daily - 7 x weekly - 2-3 sets - 10 reps  - Supine Lower Trunk Rotation  - 1 x daily - 7 x weekly - 2 sets - 10 reps  - Hooklying Clamshell with Resistance  - 1 x daily - 7 x weekly - 2 sets - 10 reps  - Supine March with Resistance Band  - 1 x daily - 7 x weekly - 2 sets - 10 reps    Access Code: [email or web address]  URL: [email or web address]  Date: 06/02/2025  Prepared by: Catrachita Hedin    Exercises  - Standing Hip Abduction Adduction at Pool Wall  - 1 x daily - 7 x weekly - 3 sets - 10 reps  - Standing Hip Flexion Extension at Pool Wall  - 1 x daily - 7 x weekly - 3 sets - 10 reps  - Standing Hip Circles at Pool Wall  - 1 x daily - 7 x weekly - 3 sets - 10 reps  - Side Stepping  - 1 x daily - 7 x weekly - 3 sets - 10 reps  - Squat  - 1 x daily - 7 x weekly - 3 sets - 10 reps  - Hamstring Stretch with Noodle  - 1 x daily - 7 x weekly - 3 sets - 10 reps  -  Quad Stretch with Noodle at Pool Wall  - 1 x daily - 7 x weekly - 3 sets - 10 reps    Charges: TE 2 MT 1    Total Timed Treatment: 43 min  Total Treatment Time: 43 min

## 2025-08-25 DIAGNOSIS — E78.5 DYSLIPIDEMIA: ICD-10-CM

## 2025-08-26 RX ORDER — EVOLOCUMAB 140 MG/ML
1 INJECTION, SOLUTION SUBCUTANEOUS
Qty: 2.1 ML | Refills: 1 | Status: SHIPPED | OUTPATIENT
Start: 2025-08-26

## 2025-08-27 PROBLEM — I25.10 ATHEROSCLEROSIS OF CORONARY ARTERY: Status: ACTIVE | Noted: 2022-11-29

## 2025-08-27 PROBLEM — M16.12 PRIMARY OSTEOARTHRITIS OF LEFT HIP: Status: ACTIVE | Noted: 2024-02-19

## (undated) DEVICE — FILTERLINE NASAL ADULT O2/CO2

## (undated) DEVICE — Device: Brand: DEFENDO AIR/WATER/SUCTION AND BIOPSY VALVE

## (undated) DEVICE — 3M™ RED DOT™ MONITORING ELECTRODE WITH FOAM TAPE AND STICKY GEL, 50/BAG, 20/CASE, 72/PLT 2570: Brand: RED DOT™

## (undated) DEVICE — ENDOSCOPY PACK - LOWER: Brand: MEDLINE INDUSTRIES, INC.

## (undated) DEVICE — FORCEP BIOPSY RJ4 LG CAP W/ND

## (undated) DEVICE — 1200CC GUARDIAN II: Brand: GUARDIAN

## (undated) DEVICE — FLUIDGARD® 160 ANTI-FOG SURGICAL MASK WITH ANTI-GLARE SHIELD: Brand: PRECEPT ®

## (undated) NOTE — MR AVS SNAPSHOT
[facility] [address] [place] [address] [address] [facility]  [address] [id number] [phone]               Thank you for choosing us for your health care visit with Kaz Norris DO.   We are glad to serve you and happy to provide you wi TSH and Free T4 [E]    Complete by:  Apr 18, 2017 (Approximate)    Thyroid profile includes: T4, free  and TSH   Assoc Dx:  Routine general medical examination at a health care facility [Z00.00]           Vitamin D, 25-Hydroxy [E]    Complete by:  Apr 18, schedule your appointment. If you are confident that your benefit plan will not require a referral or authorization, such as [place] Medicaid, please feel free to schedule your appointment immediately.  However, if you are unsure about the requirements Type 2 diabetes or prediabetes All adults beginning at age 39 and adults without symptoms at any age who are overweight or obese and have 1 or more additional risk factors for diabetes.  At  least every 3 years   Alcohol misuse All women in this age group A Syphilis Women at increased risk for infection – talk with your healthcare provider At routine exams   Tuberculosis Women at increased risk for infection – talk with your healthcare provider Ask your healthcare provider   Vision All women in this age group Counseling Who needs it How often   BRCA gene mutation testing for breast and ovarian cancer susceptibility Women with increased risk for having gene mutation When your risk is known   Breast cancer and chemoprevention Women at high risk for breast cancer Results of Recent Testing     URINALYSIS, AUTO, W/O SCOPE      Component Value Standard Range & Units    GLUCOSE (URINE DIPSTICK) neg Negative mg/dL    BILIRUBIN neg Negative    KETONES (URINE DIPSTICK) neg Negative mg/dL    SPECIFIC GRAVITY 1.010 1.005 - Water is best for hydration Fast food. Eat at home when possible     Tips for increasing your physical activity – Adults who are physically active are less likely to develop some chronic diseases than adults who are inactive.      HOW TO GET STARTED: HOW

## (undated) NOTE — LETTER
11/24/17        [address] [facility]      Dear Humera Miles,    [address] [facility] records indicate that you have outstanding lab work and or testing that was ordered for you and has not yet been completed:          Lipid Panel [E]  To pro

## (undated) NOTE — Clinical Note
For the front staff-please have her move up her blood pressure check from June till about 2 to 3 weeks from now. For the clinical staff, please have her do an ultrafast heart scan as well as a vascular screen and give her that information.

## (undated) NOTE — Clinical Note
05/18/2017        [address] [facility]      Dear James Connolly,    [address] [facility] records indicate that you have outstanding lab work and or testing that was ordered for you and has not yet been completed:    Occult Blood, Fecal, Immuno